# Patient Record
Sex: FEMALE | Race: WHITE | NOT HISPANIC OR LATINO | Employment: FULL TIME | URBAN - METROPOLITAN AREA
[De-identification: names, ages, dates, MRNs, and addresses within clinical notes are randomized per-mention and may not be internally consistent; named-entity substitution may affect disease eponyms.]

---

## 2023-12-08 ENCOUNTER — EVALUATION (OUTPATIENT)
Dept: PHYSICAL THERAPY | Facility: CLINIC | Age: 38
End: 2023-12-08
Payer: COMMERCIAL

## 2023-12-08 DIAGNOSIS — M25.552 LEFT HIP PAIN: Primary | ICD-10-CM

## 2023-12-08 PROCEDURE — 97161 PT EVAL LOW COMPLEX 20 MIN: CPT | Mod: GP | Performed by: PHYSICAL THERAPIST

## 2023-12-08 PROCEDURE — 97535 SELF CARE MNGMENT TRAINING: CPT | Mod: GP | Performed by: PHYSICAL THERAPIST

## 2023-12-08 ASSESSMENT — ENCOUNTER SYMPTOMS
DEPRESSION: 0
LOSS OF SENSATION IN FEET: 0
OCCASIONAL FEELINGS OF UNSTEADINESS: 0

## 2023-12-08 NOTE — PROGRESS NOTES
Physical Therapy Evaluation    Patient Name: Christine Bustos  MRN: [id number]  Today's Date: 12/8/2023  Referred by: Dr. Tonya Marrero  Time Calculation  Start Time: 0715  Stop Time: 0800  Time Calculation (min): 45 min  Diagnosis:  1. Left hip pain  Follow Up In Physical Therapy      PRECAUTIONS:   none    SUBJECTIVE:  Patient reports she's very active, act gym 5-7 days/week for strength training, started having pelvic pain and anterior left hip pain beginning of November, cleared of pelvic issues by OBGYN, met with sports medicine physician and diagnosed with hip impingement syndrome, current c/o pain with specific motions, limited lifting at the gym.  Pain:  0-6/10  Home Living:  No issues  Prior level of function:  Very active without limitation    OBJECTIVE:  Hip AROM: (degrees) Left Right   Flexion 90    Extension 0    Abduction 30    External Rotation 20    Internal Rotation 20      Hip PROM: (degrees) Left Right   Flexion 100    Extension 0    Abduction 45    External Rotation 30    Internal Rotation 30      Hip Strength: MMT Left Right   Flexion 5/5 /5   Extension 4+/5 /5   Abduction 4-/5 /5   External Rotation 4-/5 /5   Internal Rotation 4+/5 /5     Positive Special Tests:  + FADIR  Gait:  normal  Palpation:  + anterior left hip  Flexibility:   Hamstrings: good   Quadriceps: tight   Hip Flexors: very tight  Functional Outcome Measure:  HOS: 43%    ASSESSMENT:  Patient presents with signs consistent with diagnosis of left hip impingement syndrome with probable labral tear,  deficits noted in anterior hip flexibility and lateral/posterior hip strength, will benefit from skilled PT program to address deficits with a goal of returning patient to active lifestyle pan-free, will benefit from ortho consult if symptoms persist.    TREATMENT:  Initial evaluation performed followed by discussion of findings and instruction in HEP.    PATIENT EDUCATION:  Access Code: [email or web address]  URL:  [email or web address]  Date: 12/08/2023  Prepared by: West Arambula    Exercises  - Half Kneeling Hip Flexor Stretch with Sidebend  - 1 x daily - 7 x weekly - 1 sets - 5 reps - 30 hold  - Supine Bridge with Resistance Band  - 1 x daily - 7 x weekly - 3 sets - 10 reps  - Clamshell with Resistance  - 1 x daily - 7 x weekly - 3 sets - 10 reps  - Sidelying Hip Abduction with Resistance at Thighs  - 1 x daily - 7 x weekly - 3 sets - 10 reps    PLAN:   HEP daily, follow up in 2-3 weels, re-eval in 6 weeks.    Rehab potential:  Good  Plan of care agreement  Patient    GOALS:  Active       PT Problem       Decrease c/o left hip pain to 2/10 at worst       Start:  12/08/23    Expected End:  02/06/24            Improve left hip strength to at least 4+/5       Start:  12/08/23    Expected End:  02/06/24            Improve HOS score by at least 20%, independent with HEP       Start:  12/08/23    Expected End:  02/06/24

## 2023-12-29 ENCOUNTER — TREATMENT (OUTPATIENT)
Dept: PHYSICAL THERAPY | Facility: CLINIC | Age: 38
End: 2023-12-29
Payer: COMMERCIAL

## 2023-12-29 DIAGNOSIS — M25.552 LEFT HIP PAIN: ICD-10-CM

## 2023-12-29 PROCEDURE — 97535 SELF CARE MNGMENT TRAINING: CPT | Mod: GP | Performed by: PHYSICAL THERAPIST

## 2023-12-29 NOTE — PROGRESS NOTES
Physical Therapy Treatment    Patient Name: Christine Bustos  MRN: [id number]  Today's Date: 12/29/2023  Visit: 2/20  Diagnosis:   1. Left hip pain  Follow Up In Physical Therapy      PRECAUTIONS:  none    SUBJECTIVE:  Patient reports she's been consistent with her exercises but there hasn't been an improvement in pain, sometimes she feels worse after the exercises.  PAIN:   0-6/10 left hip    OBJECTIVE:  Left hip abduction/ER strength 4/5    TREATMENT:  Quick recheck and HEP update      ASSESSMENT:  Patient with continued signs of hip LIANNE with labral tear, unclear if further exercises will help resolve symptoms but rec she continue with exercises and set up appointment with Dr. Cruz in the coming month for further evaluation.    EDUCATION:  Access Code: [email or web address]  URL: [email or web address]  Date: 12/29/2023  Prepared by: West Arambula    Exercises  - Single Leg Bridge  - 1 x daily - 7 x weekly - 3 sets - 10 reps  - Quadruped Fire Hydrant  - 1 x daily - 7 x weekly - 3 sets - 10 reps  - Backward Band Walks with Resistance at Thighs and Feet  - 1 x daily - 7 x weekly - 3 sets - 10 reps  - Beginner Front Arm Support  - 1 x daily - 7 x weekly - 3 sets - 10 reps    PLAN:   Continue with HEP, add new exercises, set up ortho appointment with Dr. Cruz.

## 2024-01-11 DIAGNOSIS — M25.552 LEFT HIP PAIN: ICD-10-CM

## 2024-01-15 ENCOUNTER — OFFICE VISIT (OUTPATIENT)
Dept: ORTHOPEDIC SURGERY | Facility: HOSPITAL | Age: 39
End: 2024-01-15
Payer: COMMERCIAL

## 2024-01-15 ENCOUNTER — HOSPITAL ENCOUNTER (OUTPATIENT)
Dept: RADIOLOGY | Facility: HOSPITAL | Age: 39
Discharge: HOME | End: 2024-01-15
Payer: COMMERCIAL

## 2024-01-15 VITALS — BODY MASS INDEX: 18.19 KG/M2 | HEIGHT: 68 IN | WEIGHT: 120 LBS

## 2024-01-15 DIAGNOSIS — M25.852 FEMOROACETABULAR IMPINGEMENT OF LEFT HIP: ICD-10-CM

## 2024-01-15 DIAGNOSIS — M25.552 LEFT HIP PAIN: ICD-10-CM

## 2024-01-15 PROCEDURE — 73502 X-RAY EXAM HIP UNI 2-3 VIEWS: CPT | Mod: LT

## 2024-01-15 PROCEDURE — 73502 X-RAY EXAM HIP UNI 2-3 VIEWS: CPT | Mod: LEFT SIDE | Performed by: RADIOLOGY

## 2024-01-15 PROCEDURE — 99204 OFFICE O/P NEW MOD 45 MIN: CPT | Performed by: ORTHOPAEDIC SURGERY

## 2024-01-15 PROCEDURE — 99214 OFFICE O/P EST MOD 30 MIN: CPT | Performed by: ORTHOPAEDIC SURGERY

## 2024-01-15 PROCEDURE — 1036F TOBACCO NON-USER: CPT | Performed by: ORTHOPAEDIC SURGERY

## 2024-01-15 RX ORDER — BENZONATATE 100 MG/1
100 CAPSULE ORAL EVERY 8 HOURS PRN
COMMUNITY
Start: 2024-01-14 | End: 2024-02-15 | Stop reason: WASHOUT

## 2024-01-15 RX ORDER — ACETAMINOPHEN 500 MG
5000 TABLET ORAL DAILY
COMMUNITY
Start: 2022-10-31

## 2024-01-15 RX ORDER — ALBUTEROL SULFATE 90 UG/1
2 AEROSOL, METERED RESPIRATORY (INHALATION) EVERY 4 HOURS PRN
COMMUNITY
Start: 2023-03-06

## 2024-01-15 RX ORDER — BUDESONIDE 180 UG/1
1 AEROSOL, POWDER RESPIRATORY (INHALATION) 2 TIMES DAILY
COMMUNITY
Start: 2023-01-16

## 2024-01-15 ASSESSMENT — PAIN SCALES - GENERAL: PAINLEVEL_OUTOF10: 10 - WORST POSSIBLE PAIN

## 2024-01-15 ASSESSMENT — PAIN DESCRIPTION - DESCRIPTORS: DESCRIPTORS: SHARP

## 2024-01-15 ASSESSMENT — PAIN - FUNCTIONAL ASSESSMENT: PAIN_FUNCTIONAL_ASSESSMENT: 0-10

## 2024-01-15 NOTE — PROGRESS NOTES
HPI  Christine is a 38-year-old female self-referred to clinic for evaluation of left hip pain.  The patient reports onset of left anterior groin pain that is characterized as dull, aching in nature in November 2023.  She denies specific trauma or inciting event.  The patient states that the pain is exacerbated by sitting for prolonged periods of time or certain activities in the gym that require rotation, squatting, or lunging.  She was initially evaluated by her chiropractor where manipulation offered limited symptomatic improvement and then started a course of structured hip physical therapy that she continues to attend.  PT/OT has provided limited relief for her pain.  The patient states that she does not prefer anti-inflammatory medications and has not received an intra-articular corticosteroid injection.  She denies specific catching or clicking in the left hip.  Her family history is pertinent for hip impingement and her father.    ROS  Review of systems reviewed and pertinent positives mentioned in HPI.    PHYSICAL EXAM  There is not  pain with a resisted situp.    There is not abdominal distention or tenderness    The patient's range of motion reveals that they have 90° of hip flexion on the affected side.   Hip extension to 10°, IR 5°    Abduction to 45°    The patient is 5 out of 5 strength with resisted hip AB, adduction, hamstring and quadriceps testing.  No pain over the hip flexor, ASIS.  No pain over the proximal hamstring, piriformis    Pain Provacation testing:  Positive impingement sign,with a painful arc from 12 to 3:00.  Negative Psoas impingement/Yeison test  Negative instability, Log roll.  Positive subspine impingement test, which is pain with straight hip flexion.  Negative straight leg raise.  Negative circumduction clunk.    Peritrochanteric space examination:  Tenderness over the dean-trochanteric space - No  pain over the posterior trochanter - No      IMAGING  X-rays reviewed reveal no  gross fracture or dislocation. and evidence of femoral acetabular impingement with an alpha angle of 60.5 degrees.  Acetabular index of 2.2 degrees  without acetabular retroversion.  Lateral center edge of 21.4 degrees.    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    PLAN  This is a 38 y.o. female patient here today with significant hip pain despite a course of PT/OT.  Radiographs were reviewed with the patient in clinic today.  We discussed the radiographic signs to suggest underlying femoroacetabular impingement and concern for early labral calcification.  MRI scan of the left hip was ordered in clinic today to better characterize the suspected labral pathology and evaluate the chondral surfaces.  All questions were answered to her apparent satisfaction in clinic today.  She will follow-up after completion of the MRI scan.

## 2024-01-19 ENCOUNTER — APPOINTMENT (OUTPATIENT)
Dept: PHYSICAL THERAPY | Facility: CLINIC | Age: 39
End: 2024-01-19
Payer: COMMERCIAL

## 2024-01-19 ENCOUNTER — OFFICE VISIT (OUTPATIENT)
Dept: PRIMARY CARE | Facility: CLINIC | Age: 39
End: 2024-01-19
Payer: COMMERCIAL

## 2024-01-19 VITALS — HEART RATE: 72 BPM | RESPIRATION RATE: 12 BRPM | DIASTOLIC BLOOD PRESSURE: 65 MMHG | SYSTOLIC BLOOD PRESSURE: 110 MMHG

## 2024-01-19 DIAGNOSIS — R05.1 ACUTE COUGH: Primary | ICD-10-CM

## 2024-01-19 DIAGNOSIS — R59.0 AXILLARY LYMPHADENOPATHY: ICD-10-CM

## 2024-01-19 PROCEDURE — 1036F TOBACCO NON-USER: CPT | Performed by: FAMILY MEDICINE

## 2024-01-19 PROCEDURE — 99204 OFFICE O/P NEW MOD 45 MIN: CPT | Performed by: FAMILY MEDICINE

## 2024-01-19 RX ORDER — CODEINE PHOSPHATE AND GUAIFENESIN 10; 100 MG/5ML; MG/5ML
5 SOLUTION ORAL EVERY 8 HOURS PRN
COMMUNITY
Start: 2024-01-18 | End: 2024-01-26

## 2024-01-19 RX ORDER — DOXYCYCLINE 100 MG/1
100 CAPSULE ORAL 2 TIMES DAILY
Qty: 20 CAPSULE | Refills: 0 | Status: SHIPPED | OUTPATIENT
Start: 2024-01-19 | End: 2024-01-29

## 2024-01-19 NOTE — PROGRESS NOTES
Subjective   Patient ID: Christine Bustos is a 38 y.o. female who presents for cough for 10 days with green phlegm and rt axillary lymphadenopathy for 6 weeks  HPI   Patient started to have cough, green  phlegm 10 days ago. No fever or chills. No Cp, heart palpitation, hemoptysis, HA, dizziness, neck stiffness or confusion. Symptoms persisted today. Rt axillary area lymphadenopathy has been ongoing for 6 weeks. No breast lump and rt arm skin infection. No wt loss. Cxr was normal    Review of Systems    Objective   /65   Pulse 72   Resp 12     Physical Exam  No  distress, well groomed, eyes: PERRLA, No sclera icterus. No sinus tenderness or nasal discharge, neck: supple, No cervical but + rt  axillary lymphadenopathy. lungs: cta  b/l, no rales, heart: RRR, cap refill was less than 2 secs, no cyanosis, clubbing or LE edema, abd: soft, no tenderness, BS+, Good balance.     Assessment/Plan   Problem List Items Addressed This Visit             ICD-10-CM    Acute cough - Primary R05.1     Abx as dir. Cont cough syrup and albuterol prn. Call office if symptoms do not resolve in 10  days           Relevant Medications    doxycycline (Vibramycin) 100 mg capsule    Axillary lymphadenopathy R59.0     Will monitor. Will check Us if persistent after 1 mos

## 2024-01-20 NOTE — ASSESSMENT & PLAN NOTE
Abx as dir. Cont cough syrup and albuterol prn. Call office if symptoms do not resolve in 10  days

## 2024-01-31 ENCOUNTER — OFFICE VISIT (OUTPATIENT)
Dept: PRIMARY CARE | Facility: CLINIC | Age: 39
End: 2024-01-31
Payer: COMMERCIAL

## 2024-01-31 VITALS — HEART RATE: 68 BPM | SYSTOLIC BLOOD PRESSURE: 112 MMHG | DIASTOLIC BLOOD PRESSURE: 68 MMHG

## 2024-01-31 DIAGNOSIS — R59.0 AXILLARY LYMPHADENOPATHY: Primary | ICD-10-CM

## 2024-01-31 PROCEDURE — 1036F TOBACCO NON-USER: CPT | Performed by: FAMILY MEDICINE

## 2024-01-31 PROCEDURE — 99213 OFFICE O/P EST LOW 20 MIN: CPT | Performed by: FAMILY MEDICINE

## 2024-01-31 NOTE — PROGRESS NOTES
Subjective   Patient ID: Christine Bustos is a 38 y.o. female who presents for rt axillary lump    HPI   Rt axillary lump for 2 mos persisted. Pt found a 2nd ar axillary lump 1 week ago. No swelling pain pain, no wt loss. No nipple discharge  Review of Systems    Objective   /68   Pulse 68     Physical Exam  No  distress, well groomed, eyes: No sclera icterus. No sinus tenderness or nasal discharge, ? Rt  axillary enlarged  lungs: CTA  b/l, no rales, heart: RRR, cap refill was less than 2 secs,  abd: soft, no tenderness, BS+, Good balance.     Assessment/Plan   Problem List Items Addressed This Visit             ICD-10-CM    Axillary lymphadenopathy - Primary R59.0     Persistent with 2nd axillary lump found 1 week ago. Surgeon eval         Relevant Orders    Referral to General Surgery

## 2024-02-05 ENCOUNTER — OFFICE VISIT (OUTPATIENT)
Dept: SURGERY | Facility: CLINIC | Age: 39
End: 2024-02-05
Payer: COMMERCIAL

## 2024-02-05 VITALS
HEART RATE: 75 BPM | DIASTOLIC BLOOD PRESSURE: 75 MMHG | SYSTOLIC BLOOD PRESSURE: 110 MMHG | WEIGHT: 122.4 LBS | BODY MASS INDEX: 18.55 KG/M2 | OXYGEN SATURATION: 98 % | HEIGHT: 68 IN

## 2024-02-05 DIAGNOSIS — R59.0 AXILLARY LYMPHADENOPATHY: Primary | ICD-10-CM

## 2024-02-05 PROCEDURE — 1036F TOBACCO NON-USER: CPT | Performed by: SURGERY

## 2024-02-05 PROCEDURE — 99204 OFFICE O/P NEW MOD 45 MIN: CPT | Performed by: SURGERY

## 2024-02-05 ASSESSMENT — ENCOUNTER SYMPTOMS
CHILLS: 0
FEVER: 0
ABDOMINAL PAIN: 0
CONSTIPATION: 0
UNEXPECTED WEIGHT CHANGE: 0
BLOOD IN STOOL: 0
NAUSEA: 0
PALPITATIONS: 0
HEADACHES: 0
DIARRHEA: 0
COUGH: 0
VOMITING: 0
SHORTNESS OF BREATH: 0
DIZZINESS: 0

## 2024-02-05 NOTE — PROGRESS NOTES
GENERAL SURGERY CLINIC NOTE    Christine Bustos   1985   [id number]     History Of Present Illness  Christine Bustos is a 38 y.o. female who presents to the office for evaluation of a right axillary mass. She palpated the first lymph node 2-3 months ago and a second lymph node a few weeks ago. She has had a sinus issue/cough for a few weeks and took antibiotics. She underwent imaging at [facility]  that showed a likely benign 1.2cm lymph node that was previously 0.7cm. Her weight has been stable. She denies B symptoms. The patient is in the process of switching from [facility] to . She has a  PCP.      Past Medical History  She has no past medical history on file.    Surgical History  She has a past surgical history that includes Other surgical history (2016); Other surgical history (2016); and Other surgical history (2017).    Medications  Current Outpatient Medications on File Prior to Visit   Medication Sig Dispense Refill    albuterol 90 mcg/actuation inhaler Inhale 2 puffs every 4 hours if needed.      benzonatate (Tessalon) 100 mg capsule Take 1 capsule (100 mg) by mouth every 8 hours if needed.      cholecalciferol (Vitamin D-3) 25 MCG (1000 UT) tablet       [] doxycycline (Vibramycin) 100 mg capsule Take 1 capsule (100 mg) by mouth 2 times a day for 10 days. Take with at least 8 ounces (large glass) of water, do not lie down for 30 minutes after 20 capsule 0    Pulmicort Flexhaler 180 mcg/actuation inhaler Inhale 1 puff twice a day.       No current facility-administered medications on file prior to visit.       Allergies  Motrin [ibuprofen]     Social History  She reports that she has never smoked. She has never used smokeless tobacco. She reports that she does not drink alcohol and does not use drugs.    Family History  No family history on file.     Review of Systems   Constitutional:  Negative for chills, fever and unexpected weight change.   Respiratory:  Negative for cough and shortness  of breath.    Cardiovascular:  Negative for chest pain and palpitations.   Gastrointestinal:  Negative for abdominal pain, blood in stool, constipation, diarrhea, nausea and vomiting.   Neurological:  Negative for dizziness and headaches.   All other systems reviewed and are negative.      Last Recorded Vitals  There were no vitals taken for this visit.     Physical Exam  Constitutional:       General: She is not in acute distress.     Appearance: Normal appearance. She is not ill-appearing.      Comments: thin   HENT:      Head: Normocephalic and atraumatic.   Cardiovascular:      Rate and Rhythm: Normal rate and regular rhythm.   Pulmonary:      Effort: Pulmonary effort is normal. No respiratory distress.      Breath sounds: Normal breath sounds.   Abdominal:      General: There is no distension.      Palpations: Abdomen is soft.      Tenderness: There is no abdominal tenderness. There is no guarding.   Musculoskeletal:         General: No swelling.   Lymphadenopathy:      Upper Body:      Right upper body: Axillary adenopathy present.      Comments: Difficult to palpate the lymph nodes of concern, despite thin body habitus. Patient had to sit up for one of the nodes to be palpable   Skin:     General: Skin is warm and dry.   Neurological:      Mental Status: She is alert and oriented to person, place, and time. Mental status is at baseline.   Psychiatric:         Mood and Affect: Mood normal.         Behavior: Behavior normal.          Relevant Results  CT CHEST WO IVCON  IMPRESSION: 1.  Scattered subcentimeter sized pulmonary nodules measuring up to 4 mm are stable since 01/21/2023 and are most certainly benign.  No further follow-up is warranted for these nodules. 2.  Mildly enlarged RIGHT axillary lymph node with central fatty hilum, favored to be benign. : NAILA   Transcribe Date/Time: Jan 13 2024  9:44A Dictated by : LEE ANN SUN MD This examination was interpreted and the report reviewed and  electronically signed by: LEE ANN SUN MD on Jan 13 2024  9:49AM  EST    US BREAST LTD RIGHT  IMPRESSION: INCOMPLETE: NEEDS ADDITIONAL IMAGING EVALUATION There is no abnormality seen in the right breast to correspond with the palpable abnormality in the axilla, however, ultrasound is recommended. LIMITED ULTRASOUND OF RIGHT BREAST: 1/11/2024 RESULT: No prior exams were available for comparison. Real-time ultrasound of the right breast was performed. There is an enlarged lymph node with uniform cortex thickening in the right axillary tail.  This enlarged lymph node with uniform cortex thickening displays fatty hilum.  This correlates as palpated.  Color flow imaging demonstrates that there is increased vascularity.   Multiple right axillary lymph nodes are normal in architecture but with abnormally thickened cortex.  Comparison left axillary lymph nodes are normal. IMPRESSION: PROBABLY BENIGN - SHORT TERM INTERVAL FOLLOW-UP RECOMMENDED The enlarged lymph node with uniform cortex thickening is probably benign. A follow-up ultrasound in 3 months is recommended. SUMMARY: As the differential for lymphadenopathy is broad, careful clinical and laboratory evaluation and followup to assess for cause of caryl reactivity is advised. Differential possibilities include but are not limited to autoimmune (lupus, rheumatoid arthritis, scleroderma) infectious (cat scratch disease, etc) sarcoidosis, and neoplastic. Lucia Meyers M.D., mc/huang:1/11/2024 14:29:44 Multiple national specialty organizations have released breast cancer screening guidelines for women at average risk for developing breast cancer - guidelines that are based on both evidence and opinion, yet differ on when to start and how often to screen for breast cancer. With representation from Breast Imaging, Internal Medicine, Women's Health, Family Medicine, and Medical/Surgical Oncology, the [facility] has carefully reviewed the data and reached the following  consensus: 1) All women should engage in shared decision-making with their providers to decide when to start and how often to screen; 2) All women should have the opportunity to start screening mammography at age 40; 3) For women ages 45-55, we recommend annual screening mammograms; 4) For women ages 55 and over, we support both the transition from an annual to a biennial interval if this aligns more with patient's values and preferences, or continuation with annual screening; 5) All women should discuss with their providers when to stop screening mammograms. Imaging Technologist(s): Grace Robbins, RT(R)(M), [facility]; Christine Crain RT(R)(M), [facility] letter sent: # Mo FU   OVERALL STUDY BIRADS: 3 Probably benign finding - short term interval follow-up recommended : Thelma Transcribe Date/Time: Jan 11 2024  1:11P Dictated by : RICKI BARCENAS MD This examination was interpreted and the report reviewed and electronically signed by: RICKI BARCENAS MD on Jan 11 2024  2:29PM  EST    Assessment and Plan  38 y.o. female with right axillary lymphadenopathy that is more likely benign. I discussed considering interval US vs biopsy and the patient would prefer to undergo biopsy. As they are not that easily palpated especially when lying supine, I recommend US-guided biopsy by IR. If the results are inconclusive, and I am able to palpate the lymph node supine, then we can consider excisional biopsy. We will call her with the results after the US-guided biopsy and she can follow up as needed. She expressed her understanding and all questions were answered.     Risa Vergara MD, FACS  General Surgery

## 2024-02-06 ENCOUNTER — TELEPHONE (OUTPATIENT)
Dept: RADIOLOGY | Facility: HOSPITAL | Age: 39
End: 2024-02-06
Payer: COMMERCIAL

## 2024-02-06 NOTE — TELEPHONE ENCOUNTER
Call back to patient for follow up scheduling per provider request for US bx right axillary LN. Patient reports some anxiousness regarding needle biopsy, therapeutic listening and support provided. What to expect before, during, and after the procedure reviewed with the patient including strategies to help with anxiety during procedure and patients therapeutic preferences. Will notify US team of patient therapeutic preferences prior to procedure start. Patient accepted 2/7 1015 with correct read back after appointment review. All questions answered including directions to her visit and how to reach me directly with questions or concerns before concluding our call.

## 2024-02-07 ENCOUNTER — HOSPITAL ENCOUNTER (OUTPATIENT)
Dept: RADIOLOGY | Facility: HOSPITAL | Age: 39
Discharge: HOME | End: 2024-02-07
Payer: COMMERCIAL

## 2024-02-07 DIAGNOSIS — R59.0 AXILLARY LYMPHADENOPATHY: ICD-10-CM

## 2024-02-07 PROCEDURE — 10035 PLMT SFT TISS LOCLZJ DEV 1ST: CPT

## 2024-02-07 PROCEDURE — 38505 NEEDLE BIOPSY LYMPH NODES: CPT

## 2024-02-07 PROCEDURE — 88305 TISSUE EXAM BY PATHOLOGIST: CPT | Mod: TC,SUR,PORLAB | Performed by: SURGERY

## 2024-02-07 PROCEDURE — 88342 IMHCHEM/IMCYTCHM 1ST ANTB: CPT | Performed by: PATHOLOGY

## 2024-02-07 PROCEDURE — A4648 IMPLANTABLE TISSUE MARKER: HCPCS

## 2024-02-07 PROCEDURE — 88341 IMHCHEM/IMCYTCHM EA ADD ANTB: CPT | Performed by: PATHOLOGY

## 2024-02-07 PROCEDURE — 2720000003 HC TRAY FOLEY SILER W URIMETER

## 2024-02-07 PROCEDURE — 2720000007 HC OR 272 NO HCPCS

## 2024-02-07 PROCEDURE — 88305 TISSUE EXAM BY PATHOLOGIST: CPT | Performed by: PATHOLOGY

## 2024-02-07 PROCEDURE — 88365 INSITU HYBRIDIZATION (FISH): CPT | Performed by: PATHOLOGY

## 2024-02-07 PROCEDURE — 76942 ECHO GUIDE FOR BIOPSY: CPT

## 2024-02-07 NOTE — NURSING NOTE
"This RN Navigator provided therapeutic interventions/support to patient before, during, and after biopsy procedure per patient request. Patient verbalized she was \"anxious\" prior to procedure, selecting Aromatab per her preference for aromatherapy during her procedure. Assistance with guided relaxation and deep breathing provided during the procedure with successful completion of procedure. Patient rested at side of bed with guided relaxation <5 minutes until ready to dress and review post-procedure discharge. Patient calm and interactive with no sign of distress at the end of her visit.   "

## 2024-02-14 ENCOUNTER — HOSPITAL ENCOUNTER (OUTPATIENT)
Dept: RADIOLOGY | Facility: HOSPITAL | Age: 39
Discharge: HOME | End: 2024-02-14
Payer: COMMERCIAL

## 2024-02-14 DIAGNOSIS — M25.852 FEMOROACETABULAR IMPINGEMENT OF LEFT HIP: ICD-10-CM

## 2024-02-14 LAB
LAB AP ASR DISCLAIMER: NORMAL
LABORATORY COMMENT REPORT: NORMAL
PATH REPORT.ADDENDUM SPEC: NORMAL
PATH REPORT.FINAL DX SPEC: NORMAL
PATH REPORT.GROSS SPEC: NORMAL
PATH REPORT.RELEVANT HX SPEC: NORMAL
PATH REPORT.TOTAL CANCER: NORMAL
RESIDENT REVIEW: NORMAL

## 2024-02-14 PROCEDURE — 27093 INJECTION FOR HIP X-RAY: CPT | Mod: LT

## 2024-02-14 PROCEDURE — 27093 INJECTION FOR HIP X-RAY: CPT | Mod: LEFT SIDE | Performed by: RADIOLOGY

## 2024-02-14 PROCEDURE — 2550000001 HC RX 255 CONTRASTS: Performed by: ORTHOPAEDIC SURGERY

## 2024-02-14 PROCEDURE — 77002 NEEDLE LOCALIZATION BY XRAY: CPT | Mod: LEFT SIDE | Performed by: RADIOLOGY

## 2024-02-14 PROCEDURE — A9577 INJ MULTIHANCE: HCPCS | Performed by: ORTHOPAEDIC SURGERY

## 2024-02-14 PROCEDURE — 73525 CONTRAST X-RAY OF HIP: CPT | Mod: LT

## 2024-02-14 PROCEDURE — 73722 MRI JOINT OF LWR EXTR W/DYE: CPT | Mod: LEFT SIDE | Performed by: STUDENT IN AN ORGANIZED HEALTH CARE EDUCATION/TRAINING PROGRAM

## 2024-02-14 PROCEDURE — 2500000005 HC RX 250 GENERAL PHARMACY W/O HCPCS

## 2024-02-14 RX ORDER — LIDOCAINE HYDROCHLORIDE 10 MG/ML
INJECTION INFILTRATION; PERINEURAL
Status: COMPLETED
Start: 2024-02-14 | End: 2024-02-14

## 2024-02-14 RX ORDER — LIDOCAINE HYDROCHLORIDE 10 MG/ML
5 INJECTION, SOLUTION EPIDURAL; INFILTRATION; INTRACAUDAL; PERINEURAL ONCE
Status: CANCELLED | OUTPATIENT
Start: 2024-02-14 | End: 2024-02-14

## 2024-02-14 RX ADMIN — LIDOCAINE HYDROCHLORIDE 5 ML: 10 INJECTION, SOLUTION INFILTRATION; PERINEURAL at 09:43

## 2024-02-14 RX ADMIN — IOHEXOL 5 ML: 240 INJECTION, SOLUTION INTRATHECAL; INTRAVASCULAR; INTRAVENOUS; ORAL at 09:44

## 2024-02-14 RX ADMIN — GADOBENATE DIMEGLUMINE 0.1 ML: 529 INJECTION, SOLUTION INTRAVENOUS at 09:44

## 2024-02-14 NOTE — POST-PROCEDURE NOTE
Interventional Radiology Brief Postprocedure Note    Attending: Armen Muhammad    Assistant: N/A    Diagnosis: Pain    Description of procedure: The risks, benefits and alternatives of the procedure were discussed with the patient. The patient was given the chance to ask questions, and all were answered prior to proceeding. At this time, written informed consent was obtained.      The patient was placed in the supine position on the fluoroscopic table and was prepped and draped in the usual sterile fashion. The left hip joint was localized under fluoroscopy. Lidocaine was used for local anesthesia. Under fluoroscopic guidance a 20 gauge needle was inserted into the left hip joint. Approximately 10 ML of a solution containing lidocaine, Omnipaque 240 iodinated contrast and Multihance gadolinium contrast was injected into the [] joint.      The patient tolerated the procedure well and no immediate complication was noted.      Total fluoroscopy time was [7 seconds].     Anesthesia:  Local    Complications: None    Estimated Blood Loss: none    Medications (Filter: Administrations occurring from 1143 to 1143 on 02/14/24) As of 02/14/24 1143      None          No specimens collected      See detailed result report with images in PACS.    The patient tolerated the procedure well without incident or complication and is in stable condition.

## 2024-02-15 ENCOUNTER — TELEPHONE (OUTPATIENT)
Dept: SURGERY | Facility: HOSPITAL | Age: 39
End: 2024-02-15
Payer: COMMERCIAL

## 2024-02-15 DIAGNOSIS — R59.0 AXILLARY LYMPHADENOPATHY: Primary | ICD-10-CM

## 2024-02-15 NOTE — TELEPHONE ENCOUNTER
I called the patient to discuss the pathology results with her from her lymph node biopsy. There is some follicular hyperplasia and no evidence of malignancy. The lymph node may or may not decrease in size over time. No additional recommendations. She expressed her understanding and all questions were answered.

## 2024-02-16 ENCOUNTER — APPOINTMENT (OUTPATIENT)
Dept: ORTHOPEDIC SURGERY | Facility: HOSPITAL | Age: 39
End: 2024-02-16
Payer: COMMERCIAL

## 2024-02-19 ENCOUNTER — OFFICE VISIT (OUTPATIENT)
Dept: PRIMARY CARE | Facility: CLINIC | Age: 39
End: 2024-02-19
Payer: COMMERCIAL

## 2024-02-19 VITALS
BODY MASS INDEX: 18.04 KG/M2 | SYSTOLIC BLOOD PRESSURE: 112 MMHG | WEIGHT: 119 LBS | HEIGHT: 68 IN | HEART RATE: 76 BPM | DIASTOLIC BLOOD PRESSURE: 68 MMHG | RESPIRATION RATE: 12 BRPM

## 2024-02-19 DIAGNOSIS — Z00.00 ROUTINE MEDICAL EXAM: Primary | ICD-10-CM

## 2024-02-19 PROBLEM — G89.29 CHRONIC PELVIC PAIN IN FEMALE: Status: ACTIVE | Noted: 2017-08-24

## 2024-02-19 PROBLEM — R10.2 CHRONIC PELVIC PAIN IN FEMALE: Status: ACTIVE | Noted: 2017-08-24

## 2024-02-19 PROBLEM — Q21.12 PFO (PATENT FORAMEN OVALE) (HHS-HCC): Status: ACTIVE | Noted: 2023-01-01

## 2024-02-19 PROBLEM — R91.1 PULMONARY NODULE: Status: ACTIVE | Noted: 2021-06-23

## 2024-02-19 PROBLEM — K76.89 BENIGN LIVER CYST: Status: ACTIVE | Noted: 2023-11-10

## 2024-02-19 PROBLEM — J45.21 MILD INTERMITTENT ASTHMA WITH ACUTE EXACERBATION (HHS-HCC): Status: ACTIVE | Noted: 2024-01-18

## 2024-02-19 PROBLEM — M25.852 FEMOROACETABULAR IMPINGEMENT OF LEFT HIP: Status: ACTIVE | Noted: 2024-02-19

## 2024-02-19 PROBLEM — R05.1 ACUTE COUGH: Status: RESOLVED | Noted: 2024-01-19 | Resolved: 2024-02-19

## 2024-02-19 PROBLEM — R06.02 SOB (SHORTNESS OF BREATH): Status: ACTIVE | Noted: 2021-06-23

## 2024-02-19 PROCEDURE — 1036F TOBACCO NON-USER: CPT | Performed by: FAMILY MEDICINE

## 2024-02-19 PROCEDURE — 99395 PREV VISIT EST AGE 18-39: CPT | Performed by: FAMILY MEDICINE

## 2024-02-19 NOTE — PROGRESS NOTES
No concerns today. pt has no regular dental visits. no vision problems. no hearing loss.   Lifestyle: healthy diet. no weight concerns. Pt exercises regularly. no tobacco or alcohol abuse.   Safety elements used: seat belt, safe driving habits and smoke detector.   No passive smoke exposure, chemical abuse, domestic violence, anxiety symptoms, depression symptoms.  Pt has safe sexual behavior, safe driving habits. No driving violations, history of DUI.  No tuberculosis exposure.   Reproductive health: the patient is premenopausal. she reports normal menses. she uses no contraception. she is sexually active.   Cervical cancer screening:. patient has no history of an abnormal pap smear.   Review of Systems  Constitutional: no chills, no fever and no night sweats.   Eyes: no blurred vision and no eyesight problems.   ENT: no hearing loss, no nasal congestion, no nasal discharge, no hoarseness and no sore throat.   Neck: no mass(es) and no swelling.   Cardiovascular: no chest pain, no intermittent leg claudication, no lower extremity edema, no palpitations and no syncope.   Respiratory: no cough, no shortness of breath during exertion, no shortness of breath at rest and no wheezing.   Gastrointestinal: no abdominal pain, no blood in stools, no constipation, no diarrhea, no melena, no nausea, no rectal pain and no vomiting.   Genitourinary: no unexplained vaginal bleeding, no dysuria, no change in urinary frequency, no genital lesions, no hematuria, no urinary hesitancy, no incontinence, no pelvic pain, no feelings of urinary urgency and no vaginal discharge.   Musculoskeletal: no arthralgias, no back pain, no localized joint pain, no myalgias and no neck pain.   Integumentary: no new skin lesions, no nipple discharge, no rashes and no skin wound.   Neurological: no confusion, no convulsions, no difficulty walking, no headache, no limb weakness, no memory changes, no numbness, no speech difficulties, no syncope and no  tingling.   Psychiatric: no anxiety, no personality change, no depression, no emotional problems, no homicidal thoughts, no anhedonia, no sleep disturbances and no substance use disorders.   Endocrine: no changes in appetite, no deepening of the voice, no polyuria, no feelings of weakness, no heat/cold intolerance, no muscle weakness, no polydipsia, no recent weight gain and no recent weight loss.   Hematologic/Lymphatic: no tendency for easy bleeding, no tendency for easy bruising, no recurrent infections and no swollen glands.     Physical Exam  Constitutional: Alert and in no acute distress. Well developed, well nourished.   Head and Face: Head and face: Normal.  Palpation of the face and sinuses: Normal.    Eyes: Normal external exam. Pupils: PERRL with normal accommodation and EOMI.   Ears, Nose, Mouth, and Throat: External inspection of ears and nose: Normal.  Hearing: Normal.  Nasal mucosa, septum, and turbinates: Normal.  Oropharynx: Normal.    Neck: No neck mass was observed. Supple. Thyroid not enlarged and there were no palpable thyroid nodules.   Cardiovascular: Heart rate and rhythm were normal, normal S1 and S2, no gallops, no murmurs and no pericardial rub. Pedal pulses: Normal. No peripheral edema.   Pulmonary: No respiratory distress. Clear bilateral breath sounds.   Chest wall: Normal.    Abdomen: Soft nontender; no abdominal mass palpated. No organomegaly. No hernias.   Musculoskeletal: Gait and station: Normal. No joint swelling seen, normal movements of all extremities. Range of motion: Normal.  Muscle strength/tone: Normal.    Skin: Normal skin color and pigmentation, normal skin turgor, and no rash. Palpation of skin and subcutaneous tissue: Normal.    Neurologic: Cranial nerves 2-12 grossly intact. Deep tendon reflexes were 2+ and symmetric at the knees. Sensation: Normal. Coordination: Normal.    Psychiatric: Judgment and insight: Intact. Alert and oriented x 3. Recent and remote memory:  Normal.  Mood and affect: Normal.   Lymphatic: No cervical lymphadenopathy. +Palpation of lymph nodes in rt axillae.      unremarkable PE.  Recommend DASH diet and regular exercise. check  lipid, TSH.  Advise eye exam by an OD yearly and dental exam every 6 months. will monitor lipid and weight yearly.    Recommend  pap smear. Pt prefers to see her Gynecologist for evaluation.

## 2024-02-21 ENCOUNTER — APPOINTMENT (OUTPATIENT)
Dept: ORTHOPEDIC SURGERY | Facility: HOSPITAL | Age: 39
End: 2024-02-21
Payer: COMMERCIAL

## 2024-02-21 ENCOUNTER — TELEMEDICINE (OUTPATIENT)
Dept: PRIMARY CARE | Facility: CLINIC | Age: 39
End: 2024-02-21
Payer: COMMERCIAL

## 2024-02-21 DIAGNOSIS — R68.89 FLU-LIKE SYMPTOMS: Primary | ICD-10-CM

## 2024-02-21 PROCEDURE — 99213 OFFICE O/P EST LOW 20 MIN: CPT | Performed by: FAMILY MEDICINE

## 2024-02-21 PROCEDURE — 1036F TOBACCO NON-USER: CPT | Performed by: FAMILY MEDICINE

## 2024-02-21 RX ORDER — OSELTAMIVIR PHOSPHATE 75 MG/1
75 CAPSULE ORAL 2 TIMES DAILY
Qty: 10 CAPSULE | Refills: 0 | Status: SHIPPED | OUTPATIENT
Start: 2024-02-21 | End: 2024-02-26

## 2024-02-21 ASSESSMENT — ENCOUNTER SYMPTOMS
SORE THROAT: 0
WHEEZING: 0
ABDOMINAL PAIN: 0
VOMITING: 0
DYSURIA: 0
NAUSEA: 0
COUGH: 1
HEADACHES: 0
DIARRHEA: 0
FEVER: 1

## 2024-02-21 NOTE — PROGRESS NOTES
Subjective   Patient ID: Christine Bustos is a 38 y.o. female who presents for fever    HPI   Patient started to have fever but chills yesterday, +  cough, shortness of breath, no  change of smell, change of taste, HA. +  fatigue, no muscle aches, body aches, congestion, runny nose, nausea, vomiting, diarrhea, sore throat,  chest pain. Rapid home covid test was -. No flu exposure    Review of Systems    Objective   There were no vitals taken for this visit.    Physical Exam    Assessment/Plan   Problem List Items Addressed This Visit             ICD-10-CM    Flu-like symptoms - Primary R68.89     Tylenol prn for fever or ache. Increase fluid intake. If symptoms do not improve in 3-4 days, call office.           Relevant Medications    oseltamivir (Tamiflu) 75 mg capsule

## 2024-02-21 NOTE — ASSESSMENT & PLAN NOTE
Tylenol prn for fever or ache. Increase fluid intake. If symptoms do not improve in 3-4 days, call office.

## 2024-02-23 ENCOUNTER — APPOINTMENT (OUTPATIENT)
Dept: PRIMARY CARE | Facility: CLINIC | Age: 39
End: 2024-02-23
Payer: COMMERCIAL

## 2024-02-27 ENCOUNTER — TELEPHONE (OUTPATIENT)
Dept: SURGERY | Facility: CLINIC | Age: 39
End: 2024-02-27
Payer: COMMERCIAL

## 2024-02-27 NOTE — TELEPHONE ENCOUNTER
----- Message from Risa Vergara MD sent at 2/27/2024  7:28 AM EST -----  Can you please let the patient know her lymph node biopsy results are benign? Very slight overgrowth of lymph node cells, but nothing concerning for cancer.

## 2024-03-05 ENCOUNTER — LAB (OUTPATIENT)
Dept: LAB | Facility: LAB | Age: 39
End: 2024-03-05
Payer: COMMERCIAL

## 2024-03-05 DIAGNOSIS — Z00.00 ROUTINE MEDICAL EXAM: ICD-10-CM

## 2024-03-05 LAB
CHOLEST SERPL-MCNC: 140 MG/DL (ref 0–199)
CHOLESTEROL/HDL RATIO: 2.1
HBV SURFACE AG SERPL QL IA: NONREACTIVE
HCV AB SER QL: NONREACTIVE
HDLC SERPL-MCNC: 65.5 MG/DL
LDLC SERPL CALC-MCNC: 58 MG/DL
NON HDL CHOLESTEROL: 75 MG/DL (ref 0–149)
TRIGL SERPL-MCNC: 83 MG/DL (ref 0–149)
TSH SERPL-ACNC: 1.92 MIU/L (ref 0.44–3.98)
VLDL: 17 MG/DL (ref 0–40)

## 2024-03-05 PROCEDURE — 87340 HEPATITIS B SURFACE AG IA: CPT

## 2024-03-05 PROCEDURE — 84443 ASSAY THYROID STIM HORMONE: CPT

## 2024-03-05 PROCEDURE — 80061 LIPID PANEL: CPT

## 2024-03-05 PROCEDURE — 86803 HEPATITIS C AB TEST: CPT

## 2024-03-05 PROCEDURE — 36415 COLL VENOUS BLD VENIPUNCTURE: CPT

## 2024-03-06 ENCOUNTER — PREP FOR PROCEDURE (OUTPATIENT)
Dept: ORTHOPEDIC SURGERY | Facility: HOSPITAL | Age: 39
End: 2024-03-06
Payer: COMMERCIAL

## 2024-03-06 ENCOUNTER — OFFICE VISIT (OUTPATIENT)
Dept: ORTHOPEDIC SURGERY | Facility: HOSPITAL | Age: 39
End: 2024-03-06
Payer: COMMERCIAL

## 2024-03-06 DIAGNOSIS — S73.192A TEAR OF LEFT ACETABULAR LABRUM, INITIAL ENCOUNTER: ICD-10-CM

## 2024-03-06 DIAGNOSIS — M25.852 FEMOROACETABULAR IMPINGEMENT OF LEFT HIP: Primary | ICD-10-CM

## 2024-03-06 DIAGNOSIS — M24.052 LOOSE BODY IN LEFT HIP: ICD-10-CM

## 2024-03-06 DIAGNOSIS — S73.192A TEAR OF LEFT ACETABULAR LABRUM, INITIAL ENCOUNTER: Primary | ICD-10-CM

## 2024-03-06 DIAGNOSIS — M25.852 FEMOROACETABULAR IMPINGEMENT OF LEFT HIP: ICD-10-CM

## 2024-03-06 PROCEDURE — 99213 OFFICE O/P EST LOW 20 MIN: CPT | Performed by: ORTHOPAEDIC SURGERY

## 2024-03-06 PROCEDURE — E0114 CRUTCH UNDERARM PAIR NO WOOD: HCPCS | Performed by: ORTHOPAEDIC SURGERY

## 2024-03-06 PROCEDURE — 1036F TOBACCO NON-USER: CPT | Performed by: ORTHOPAEDIC SURGERY

## 2024-03-06 PROCEDURE — L1686 HO POST-OP HIP ABDUCTION: HCPCS | Performed by: ORTHOPAEDIC SURGERY

## 2024-03-11 ENCOUNTER — TELEMEDICINE CLINICAL SUPPORT (OUTPATIENT)
Dept: PREADMISSION TESTING | Facility: HOSPITAL | Age: 39
End: 2024-03-11
Payer: COMMERCIAL

## 2024-03-11 VITALS — HEIGHT: 68 IN | WEIGHT: 120 LBS | BODY MASS INDEX: 18.19 KG/M2

## 2024-03-11 RX ORDER — BISMUTH SUBSALICYLATE 262 MG
1 TABLET,CHEWABLE ORAL DAILY
COMMUNITY

## 2024-03-11 NOTE — PREPROCEDURE INSTRUCTIONS
Current Medications   Medication Instructions    albuterol 90 mcg/actuation inhaler -use as needed    cholecalciferol (Vitamin D-3) 5,000 Units tablet Stop 7 days before surgery    multivitamin tablet Stop 7 days before surgery    Pulmicort Flexhaler 180 mcg/actuation inhaler Use as needed                       NPO Instructions:    Do not eat any food after midnight the night before your surgery/procedure.    Additional Instructions:     Seven/Six Days before Surgery:  Review your medication instructions, stop indicated medications  Five Days before Surgery:  Review your medication instructions, stop indicated medications  Three Days before Surgery:  Review your medication instructions, stop indicated medications  The Day before Surgery:  Review your medication instructions, stop indicated medications  You will be contacted regarding the time of your arrival to facility and surgery time  Do not eat any food after Midnight  Day of Surgery:  Review your medication instructions, take indicated medications  Wear  comfortable loose fitting clothing  Do not use moisturizers, creams, lotions or perfume  All jewelry and valuables should be left at homePAT DISCHARGE INSTRUCTIONS    Please call the Same Day Surgery (SDS) Department of the hospital where your procedure will be performed between 2:00- 3:30 PM the day before your surgery. If you are scheduled on a Monday, or a Tuesday following a Monday holiday, you will need to call on the last business day prior to your surgery.    [facility]  [address] [place] [address]  [phone]    Please let your surgeon know if:      You develop any open sores, shingles, burning or painful urination as these may increase your risk of an infection.   You no longer wish to have the surgery.   Any other personal circumstances change that may lead to the need to cancel or defer this surgery-such as being sick or getting admitted to any  hospital within one week of your planned procedure.    Your contact details change, such as a change of address or phone number.    Starting now:     Please DO NOT drink alcohol or smoke for 24 hours before surgery. It is well known that quitting smoking can make a huge difference to your health and recovery from surgery. The longer you abstain from smoking, the better your chances of a healthy recovery. If you need help with quitting, call 2-800-QUIT-NOW to be connected to a trained counselor who will discuss the best methods to help you quit.     Before your surgery:    Please stop all supplements 7 days prior to surgery. Or as directed by your surgeon.   Please stop taking NSAID pain medicine such as Advil and Motrin 7 days before surgery.    If you develop any fever, cough, cold, rashes, cuts, scratches, scrapes, urinary symptoms or infection anywhere on your body (including teeth and gums) prior to surgery, please call your surgeon’s office as soon as possible. This may require treatment to reduce the chance of cancellation on the day of surgery.    The day before your surgery:   DIET- Do not eat any food after MIDNIGHT.   Get a good night’s rest.  Use the special soap for bathing if you have been instructed to use one.    Scheduled surgery times may change and you will be notified if this occurs - please check your personal voicemail for any updates.     On the morning of surgery:   Wear comfortable, loose fitting clothes which open in the front. Please do not wear moisturizers, creams, lotions, makeup or perfume.    Please bring with you to surgery:   Photo ID and insurance card   Current list of medicines and allergies   Pacemaker/ Defibrillator/Heart stent cards   CPAP machine and mask    Slings/ splints/ crutches   A copy of your complete advanced directive/DHPOA.    Please do NOT bring with you to surgery:   All jewelry and valuables should be left at home.   Prosthetic devices such as contact lenses,  hearing aids, dentures, eyelash extensions, hairpins and body piercings must be removed prior to going in to the surgical suite.    After outpatient surgery:   A responsible adult MUST accompany you at the time of discharge and stay with you for 24 hours after your surgery. You may NOT drive yourself home after surgery.    Do not drive, operate machinery, make critical decisions or do activities that require co-ordination or balance until after a night’s sleep.   Do not drink alcoholic beverages for 24 hours.   Instructions for resuming your medications will be provided by your surgeon.    CALL YOUR DOCTOR AFTER SURGERY IF YOU HAVE:     Chills and/or a fever of 101° F or higher.    Redness, swelling, pus or drainage from your surgical wound or a bad smell from the wound.    Lightheadedness, fainting or confusion.    Persistent vomiting (throwing up) and are not able to eat or drink for 12 hours.    Three or more loose, watery bowel movements in 24 hours (diarrhea).   Difficulty or pain while urinating( after non-urological surgery)    Pain and swelling in your legs, especially if it is only on one side.    Difficulty breathing or are breathing faster than normal.    Any new concerning symptoms.      Reviewed pre-op instructions with patient, states understanding and denies further questions at this time.    If you have not received a call regarding your arrival time for surgery by 2pm on the day before surgery, you can call [phone].    Take Care  CONTACT SURGEON'S OFFICE IF YOU DEVELOP:  * Fever = 100.4 F   * New respiratory symptoms (e.g. cough, shortness of breath, respiratory distress, sore throat)  * Recent loss of taste or smell  *Flu like symptoms such as headache, fatigue or gastrointestinal symptoms  * You develop any open sores, shingles, burning or painful urination   AND/OR:  * You no longer wish to have the surgery.  * Any other personal circumstances change that may lead to the need to cancel or  defer this surgery.  *You were admitted to any hospital within one week of your planned procedure.    SMOKING:  *Quitting smoking can make a huge difference to your health and recovery from surgery.    *If you need help with quitting, call 7-192-QUIT-NOW.    THE DAY BEFORE SURGERY:  *Do not eat any food after midnight the night before your surgery.   *You may have up to TEN OUNCES of clear liquids until TWO hours before your instructed ARRIVAL TIME to hospital. This includes water, black tea/coffee, (no milk or cream) apple juice, clear broth and electrolyte drinks (Gatorade). Please avoid clear liquids that are red in color.   *You may chew gum/mints up to TWO hours before your surgery/procedure.    SURGICAL TIME:  *You will be contacted between 2 p.m. and 3 p.m. the business day before your surgery with your arrival time.  *If you haven't received a call by 3pm, call [phone]  *Scheduled surgery times may change and you will be notified if this occurs-check your personal voicemail for any updates.    ON THE MORNING OF SURGERY:  *Wear comfortable, loose fitting clothing.   *Do not use moisturizers, creams, lotions or perfume.  *All jewelry and valuables should be left at home.  *Prosthetic devices such as contact lenses, hearing aids, dentures, eyelash extensions, hairpins and body piercing must be removed before surgery.    BRING WITH YOU:  *Photo ID and insurance card  *Current list of medications and allergies  *Pacemaker/Defibrillator/Heart stent cards  *CPAP machine and mask  *Slings/splints/crutches  *Copy of your complete Advanced Directive/DHPOA-if applicable  *Neurostimulator implant remote    PARKING AND ARRIVAL:  *Check in at the Main Entrance desk and let them know you are here for surgery.    IF YOU ARE HAVING OUTPATIENT/SAME DAY SURGERY:  *A responsible adult MUST accompany you at the time of discharge and stay with you for 24 hours after your surgery.  *You may NOT drive yourself home after  surgery.  *You may use a taxi or ride sharing service ([employer], [employer]) to return home ONLY if you are accompanied by a friend or family member.  *Instructions for resuming your medications will be provided by your surgeon.    Thank you for coming to Pre Admission testing.     If I have prescribe medication please don't forget to  at your pharmacy.     Any questions about today's visit call [phone] and leave a message in the general mailbox.    Patient instructed to ambulate as soon as possible postoperatively to decrease thromboembolic risk.    Jacqui Moon RN

## 2024-03-12 ENCOUNTER — LAB (OUTPATIENT)
Dept: LAB | Facility: LAB | Age: 39
End: 2024-03-12
Payer: COMMERCIAL

## 2024-03-12 ENCOUNTER — TELEPHONE (OUTPATIENT)
Dept: ORTHOPEDIC SURGERY | Facility: HOSPITAL | Age: 39
End: 2024-03-12

## 2024-03-12 DIAGNOSIS — S73.192A TEAR OF LEFT ACETABULAR LABRUM, INITIAL ENCOUNTER: ICD-10-CM

## 2024-03-12 DIAGNOSIS — M25.852 FEMOROACETABULAR IMPINGEMENT OF LEFT HIP: ICD-10-CM

## 2024-03-12 LAB
ALBUMIN SERPL BCP-MCNC: 4.3 G/DL (ref 3.4–5)
ALP SERPL-CCNC: 49 U/L (ref 33–110)
ALT SERPL W P-5'-P-CCNC: 16 U/L (ref 7–45)
ANION GAP SERPL CALC-SCNC: 12 MMOL/L (ref 10–20)
AST SERPL W P-5'-P-CCNC: 17 U/L (ref 9–39)
BASOPHILS # BLD AUTO: 0.03 X10*3/UL (ref 0–0.1)
BASOPHILS NFR BLD AUTO: 0.8 %
BILIRUB SERPL-MCNC: 0.7 MG/DL (ref 0–1.2)
BUN SERPL-MCNC: 13 MG/DL (ref 6–23)
CALCIUM SERPL-MCNC: 9.4 MG/DL (ref 8.6–10.6)
CHLORIDE SERPL-SCNC: 105 MMOL/L (ref 98–107)
CO2 SERPL-SCNC: 28 MMOL/L (ref 21–32)
CREAT SERPL-MCNC: 0.89 MG/DL (ref 0.5–1.05)
EGFRCR SERPLBLD CKD-EPI 2021: 85 ML/MIN/1.73M*2
EOSINOPHIL # BLD AUTO: 0.07 X10*3/UL (ref 0–0.7)
EOSINOPHIL NFR BLD AUTO: 1.9 %
ERYTHROCYTE [DISTWIDTH] IN BLOOD BY AUTOMATED COUNT: 12.9 % (ref 11.5–14.5)
GLUCOSE SERPL-MCNC: 87 MG/DL (ref 74–99)
HCT VFR BLD AUTO: 37.3 % (ref 36–46)
HGB BLD-MCNC: 12.1 G/DL (ref 12–16)
IMM GRANULOCYTES # BLD AUTO: 0.01 X10*3/UL (ref 0–0.7)
IMM GRANULOCYTES NFR BLD AUTO: 0.3 % (ref 0–0.9)
LYMPHOCYTES # BLD AUTO: 1.27 X10*3/UL (ref 1.2–4.8)
LYMPHOCYTES NFR BLD AUTO: 34.9 %
MCH RBC QN AUTO: 31.7 PG (ref 26–34)
MCHC RBC AUTO-ENTMCNC: 32.4 G/DL (ref 32–36)
MCV RBC AUTO: 98 FL (ref 80–100)
MONOCYTES # BLD AUTO: 0.45 X10*3/UL (ref 0.1–1)
MONOCYTES NFR BLD AUTO: 12.4 %
NEUTROPHILS # BLD AUTO: 1.81 X10*3/UL (ref 1.2–7.7)
NEUTROPHILS NFR BLD AUTO: 49.7 %
NRBC BLD-RTO: 0 /100 WBCS (ref 0–0)
PLATELET # BLD AUTO: 198 X10*3/UL (ref 150–450)
POTASSIUM SERPL-SCNC: 4.1 MMOL/L (ref 3.5–5.3)
PROT SERPL-MCNC: 6.8 G/DL (ref 6.4–8.2)
RBC # BLD AUTO: 3.82 X10*6/UL (ref 4–5.2)
SODIUM SERPL-SCNC: 141 MMOL/L (ref 136–145)
WBC # BLD AUTO: 3.6 X10*3/UL (ref 4.4–11.3)

## 2024-03-12 PROCEDURE — 85025 COMPLETE CBC W/AUTO DIFF WBC: CPT

## 2024-03-12 PROCEDURE — 36415 COLL VENOUS BLD VENIPUNCTURE: CPT

## 2024-03-12 PROCEDURE — 80053 COMPREHEN METABOLIC PANEL: CPT

## 2024-03-12 NOTE — TELEPHONE ENCOUNTER
Called Christine and left a message regarding answers to a few questions she had with preadmission testing. If she has anymore questions I asked her to call back.

## 2024-03-13 DIAGNOSIS — M25.852 FEMOROACETABULAR IMPINGEMENT OF LEFT HIP: Primary | ICD-10-CM

## 2024-03-15 ENCOUNTER — APPOINTMENT (OUTPATIENT)
Dept: SPORTS MEDICINE | Facility: HOSPITAL | Age: 39
End: 2024-03-15
Payer: COMMERCIAL

## 2024-03-15 NOTE — PROGRESS NOTES
Patient has exhausted conservative management including therapeutic exercises, activity modification, NSAIDS.  They continue to have worsening deep groin pain with mechanical symptoms affecting ADLs.  Patient would like to proceed with surgery entailing a hip arthroscopy, acetabuloplasty for acetabular retroversion, labral repair, femoral osteoplasty, loose body removal for possible cartilaginous loose body seen on MRI, and capsular plication for mild hip instability.    We discussed the risks and benefits of surgery which included but were not limited to bleeding, infection, damage to nerves, damage to blood vessels, need for further procedures, risks of anesthesia, heterotopic ossification, femoral neck stress fracture, avascular necrosis of the femoral head, pudendal nerve palsy iatrogenic instability progression of osteoarthritis.    Patient was prescribed a hinged hip brace for LIANNE/labral tear.  The patient is ambulatory with or without aid; but, has weakness, instability and/or deformity of their left hip which requires stabilization from this orthosis to improve their function.      Verbal and written instructions for the use, wear schedule, cleaning and application of this item were given.  Patient was instructed that should the brace result in increased pain, decreased sensation, increased swelling, or an overall worsening of their medical condition, to please contact our office immediately.     Patient was prescribed crutches for LIANNE/labral tear.  This mobility device is required for the following reasons:    1. The patient has a mobility limitation that significantly impairs their ability to participate in one or more mobility-related activities of daily living (MRADL) in the home; and  2. The patient is able to safely use the mobility device; and  3. The functional mobility deficit can be sufficiently resolved with use of the mobility device    Verbal and written instructions for the use, wear schedule,  cleaning and application of this item were given.  Education provided also included gait training and safety precautions when using this device. Patient was instructed that should the item result in increased pain, decreased sensation, increased swelling, or an overall worsening of their medical condition, to please contact our office immediately.    Orthotic management and training was provided for skin care, modifications due to healing tissues, edema changes, interruption in skin integrity, and safety precautions with the orthosis.

## 2024-03-19 ENCOUNTER — PRE-ADMISSION TESTING (OUTPATIENT)
Dept: PREADMISSION TESTING | Facility: HOSPITAL | Age: 39
End: 2024-03-19
Payer: COMMERCIAL

## 2024-03-19 ENCOUNTER — TRANSCRIBE ORDERS (OUTPATIENT)
Dept: OPERATING ROOM | Facility: HOSPITAL | Age: 39
End: 2024-03-19

## 2024-03-19 VITALS
HEIGHT: 68 IN | OXYGEN SATURATION: 100 % | TEMPERATURE: 98.1 F | DIASTOLIC BLOOD PRESSURE: 76 MMHG | WEIGHT: 116.29 LBS | RESPIRATION RATE: 12 BRPM | SYSTOLIC BLOOD PRESSURE: 120 MMHG | BODY MASS INDEX: 17.63 KG/M2 | HEART RATE: 61 BPM

## 2024-03-19 DIAGNOSIS — Z01.818 PREOPERATIVE TESTING: Primary | ICD-10-CM

## 2024-03-19 DIAGNOSIS — M25.852 FEMOROACETABULAR IMPINGEMENT OF LEFT HIP: ICD-10-CM

## 2024-03-19 LAB
B-HCG SERPL-ACNC: <2 MIU/ML
BASOPHILS # BLD AUTO: 0.02 X10*3/UL (ref 0–0.1)
BASOPHILS NFR BLD AUTO: 0.5 %
EOSINOPHIL # BLD AUTO: 0.03 X10*3/UL (ref 0–0.7)
EOSINOPHIL NFR BLD AUTO: 0.8 %
ERYTHROCYTE [DISTWIDTH] IN BLOOD BY AUTOMATED COUNT: 13 % (ref 11.5–14.5)
HCT VFR BLD AUTO: 38.4 % (ref 36–46)
HGB BLD-MCNC: 12.5 G/DL (ref 12–16)
IMM GRANULOCYTES # BLD AUTO: 0.01 X10*3/UL (ref 0–0.7)
IMM GRANULOCYTES NFR BLD AUTO: 0.3 % (ref 0–0.9)
LYMPHOCYTES # BLD AUTO: 1.22 X10*3/UL (ref 1.2–4.8)
LYMPHOCYTES NFR BLD AUTO: 32.2 %
MCH RBC QN AUTO: 30.9 PG (ref 26–34)
MCHC RBC AUTO-ENTMCNC: 32.6 G/DL (ref 32–36)
MCV RBC AUTO: 95 FL (ref 80–100)
MONOCYTES # BLD AUTO: 0.41 X10*3/UL (ref 0.1–1)
MONOCYTES NFR BLD AUTO: 10.8 %
NEUTROPHILS # BLD AUTO: 2.1 X10*3/UL (ref 1.2–7.7)
NEUTROPHILS NFR BLD AUTO: 55.4 %
NRBC BLD-RTO: ABNORMAL /100{WBCS}
PLATELET # BLD AUTO: 169 X10*3/UL (ref 150–450)
RBC # BLD AUTO: 4.05 X10*6/UL (ref 4–5.2)
WBC # BLD AUTO: 3.8 X10*3/UL (ref 4.4–11.3)

## 2024-03-19 PROCEDURE — 93005 ELECTROCARDIOGRAM TRACING: CPT

## 2024-03-19 PROCEDURE — 99203 OFFICE O/P NEW LOW 30 MIN: CPT | Performed by: NURSE PRACTITIONER

## 2024-03-19 PROCEDURE — 85025 COMPLETE CBC W/AUTO DIFF WBC: CPT

## 2024-03-19 PROCEDURE — 84702 CHORIONIC GONADOTROPIN TEST: CPT

## 2024-03-19 PROCEDURE — 36415 COLL VENOUS BLD VENIPUNCTURE: CPT

## 2024-03-19 PROCEDURE — 93010 ELECTROCARDIOGRAM REPORT: CPT | Performed by: INTERNAL MEDICINE

## 2024-03-19 ASSESSMENT — ENCOUNTER SYMPTOMS
NEUROLOGICAL NEGATIVE: 1
ENDOCRINE NEGATIVE: 1
EYES NEGATIVE: 1
GASTROINTESTINAL NEGATIVE: 1
ALLERGIC/IMMUNOLOGIC NEGATIVE: 1
PSYCHIATRIC NEGATIVE: 1
CONSTITUTIONAL NEGATIVE: 1

## 2024-03-19 ASSESSMENT — PAIN DESCRIPTION - DESCRIPTORS: DESCRIPTORS: SHARP;RADIATING

## 2024-03-19 ASSESSMENT — PAIN SCALES - GENERAL: PAINLEVEL_OUTOF10: 6

## 2024-03-19 ASSESSMENT — PAIN - FUNCTIONAL ASSESSMENT: PAIN_FUNCTIONAL_ASSESSMENT: 0-10

## 2024-03-19 NOTE — CPM/PAT H&P
CPM/PAT Evaluation     Christine Bustos is a 38 y.o. female   Chief Complaint: hip pain having surgery torn labrum    HPI:  Patient is a 39 y/o alert and oriented female coming in for PAT for a scheduled Hip Arthroscopy, Labral Repair, Rim Trim Osteoplasty, Capsular Plication on 3/26/24 w/ Dr. Cruz.    The patient reports 6/10 sharp, shooting hip pain that radiates to her knee.  She states walking after sitting exacerbates the pain.  She states heat helps the pain.  She has had physical therapy.  She has not had any injections.    Patient denies chest pain, SOB, MAGAÑA and NVDC.    Patient also denies Hx: DVT/PE.    Current medications were reviewed and a presurgical mediation schedule was provided.  She has no questions at this time.   Past Medical History:   Diagnosis Date    Acute renal failure (CMS/HCC)     as a child    Asthma     exercise or infection induced    Delayed emergence from general anesthesia     The last time I was put under anesthesia was in 2017 or 2018 and they always tell me to not go back to sleep and constantly come over to me because I try going back to sleep    Endometriosis     Fissure, anal 2006    PFO (patent foramen ovale)     last checked 2023 checked- ok      Past Surgical History:   Procedure Laterality Date    ADENOIDECTOMY      OTHER SURGICAL HISTORY  01/20/2016    wisdom teeth    OTHER SURGICAL HISTORY  01/20/2016    Laparoscopic Excision Of Ectopic Preg & bilateral tubes    OTHER SURGICAL HISTORY  11/03/2017    Laparos Large Intest Laser Vaporization Endometriotic Tissue    OTHER SURGICAL HISTORY      excision  of endometrial tissue    OTHER SURGICAL HISTORY      right axilla lymph node biopsy - neg    TONSILLECTOMY      TUBAL LIGATION          Allergies   Allergen Reactions    Ciprofloxacin Other     IV only when with flagyl skin was burning and red, oral ok    Flagyl [Metronidazole] Other     With cipro with IV burning skin hot and red, oral ok    Motrin [Ibuprofen] Other     As a   child had acute renal failure        Current Outpatient Medications on File Prior to Visit   Medication Sig Dispense Refill    albuterol 90 mcg/actuation inhaler Inhale 2 puffs every 4 hours if needed.      cholecalciferol (Vitamin D-3) 5,000 Units tablet Take 1 tablet (5,000 Units) by mouth once daily.      multivitamin tablet Take 1 tablet by mouth once daily.      Pulmicort Flexhaler 180 mcg/actuation inhaler Inhale 1 puff twice a day.       No current facility-administered medications on file prior to visit.       Vitals:    03/19/24 0713   BP: 120/76   Pulse: 61   Resp: 12   Temp: 36.7 °C (98.1 °F)   SpO2: 100%       Review of Systems   Constitutional: Negative.    HENT: Negative.     Eyes: Negative.    Respiratory:          Mild persistent asthma - stable    Cardiovascular:         PFO - stable    Gastrointestinal: Negative.    Endocrine: Negative.    Genitourinary:         Acute renal failure as child - stable bun & Creat   Musculoskeletal:         See hpi for details   Skin: Negative.    Allergic/Immunologic: Negative.    Neurological: Negative.    Hematological:         Negative right breast lymph node biopsy 2/2024   Psychiatric/Behavioral: Negative.        Physical Exam  Vitals and nursing note reviewed.   Constitutional:       Appearance: Normal appearance.   HENT:      Head: Normocephalic and atraumatic.      Mouth/Throat:      Mouth: Mucous membranes are moist.      Pharynx: Oropharynx is clear.   Eyes:      Pupils: Pupils are equal, round, and reactive to light.   Cardiovascular:      Rate and Rhythm: Normal rate and regular rhythm.      Heart sounds: Normal heart sounds.      Comments: EKG today is NSR w/ Sinus Arrhythmia rate of 62  Pulmonary:      Effort: Pulmonary effort is normal.      Breath sounds: Normal breath sounds.   Abdominal:      General: Bowel sounds are normal.      Palpations: Abdomen is soft.   Musculoskeletal:         General: Normal range of motion.      Cervical back: Normal  range of motion.   Skin:     General: Skin is warm and dry.   Neurological:      General: No focal deficit present.      Mental Status: She is alert and oriented to person, place, and time.   Psychiatric:         Mood and Affect: Mood normal.         Behavior: Behavior normal.         Thought Content: Thought content normal.         Judgment: Judgment normal.        PAT AIRWAY:   Airway:     Mallampati::  IV    TM distance::  >3 FB    Neck ROM::  Full  No dental issues has own teeth  Does not smoke  No alcohol or drug use  Patient is slow to wake up from anesthesia and reports having a hard time walking after anesthesia  No family issues with anesthesia    Assessment and Plan:   Tear of Left Acetabular Labrum, Femoroacetabular Impingement of Left Hip, Loose Body In Left Hip  Hip Arthroscopy, Labral Repair, Rim Trim Osteoplasty, Capsular Plication    PFO  Stable    She does not need cardiac clearance for a PFO per cardiology    Ashtma  Managed with albuterol 2 puffs q4hprn  Managed with pulmicort inhaler 1 puff bid  Follow up w/ pulmonology as scheduled last appt 3/19/2024  Spirometry normal 6/16/2023  Stable no recent flares    Benign liver cysts  Stable     Enlarged axillary lymph node -right breast  Biopsy 2/7/2024 follicular hyperplasia with no morphologic evidence of lymphoma or carcinoma    ASA II  RCRI - 0 points  Class I Risk 3.9%  DELFINO -1  points low Risk for AMNA   NSQIP - Predicted length of stay 0-1 days  ARISCAT -  points Low Risk 1.6%  DASI 34.7 Points 7.01 Mets  DEANN - 0.1%  JHFRAT - 0 points no risk for falls  Clearance - not indicated  PAT Testing - EKG   CBC & HCG per Dr. Cruz's PA-C  CMP stable 3/12/24  TSH normal 3/12/24  Face to Face patient contact time 30 minutes    HOLLY Forrester-CNP 3/19/2024 7:34 AM  Results for orders placed or performed in visit on 03/19/24 (from the past 24 hour(s))   ECG 12 Lead   Result Value Ref Range    Ventricular Rate 62 BPM    Atrial Rate 62 BPM    WY Interval  186 ms    QRS Duration 76 ms    QT Interval 396 ms    QTC Calculation(Bazett) 401 ms    P Axis 70 degrees    R Axis 77 degrees    T Axis 71 degrees    QRS Count 10 beats    Q Onset 222 ms    P Onset 129 ms    P Offset 181 ms    T Offset 420 ms    QTC Fredericia 400 ms   CBC and Auto Differential   Result Value Ref Range    WBC 3.8 (L) 4.4 - 11.3 x10*3/uL    nRBC      RBC 4.05 4.00 - 5.20 x10*6/uL    Hemoglobin 12.5 12.0 - 16.0 g/dL    Hematocrit 38.4 36.0 - 46.0 %    MCV 95 80 - 100 fL    MCH 30.9 26.0 - 34.0 pg    MCHC 32.6 32.0 - 36.0 g/dL    RDW 13.0 11.5 - 14.5 %    Platelets 169 150 - 450 x10*3/uL    Neutrophils % 55.4 40.0 - 80.0 %    Immature Granulocytes %, Automated 0.3 0.0 - 0.9 %    Lymphocytes % 32.2 13.0 - 44.0 %    Monocytes % 10.8 2.0 - 10.0 %    Eosinophils % 0.8 0.0 - 6.0 %    Basophils % 0.5 0.0 - 2.0 %    Neutrophils Absolute 2.10 1.20 - 7.70 x10*3/uL    Immature Granulocytes Absolute, Automated 0.01 0.00 - 0.70 x10*3/uL    Lymphocytes Absolute 1.22 1.20 - 4.80 x10*3/uL    Monocytes Absolute 0.41 0.10 - 1.00 x10*3/uL    Eosinophils Absolute 0.03 0.00 - 0.70 x10*3/uL    Basophils Absolute 0.02 0.00 - 0.10 x10*3/uL

## 2024-03-19 NOTE — PREPROCEDURE INSTRUCTIONS
Medication List            Accurate as of March 19, 2024  7:11 AM. Always use your most recent med list.                albuterol 90 mcg/actuation inhaler  Medication Adjustments for Surgery: Other (Comment)  Notes to patient: Take as needed     cholecalciferol 5,000 Units tablet  Commonly known as: Vitamin D-3  Medication Adjustments for Surgery: Stop 7 days before surgery     multivitamin tablet  Medication Adjustments for Surgery: Stop 7 days before surgery     Pulmicort Flexhaler 180 mcg/actuation inhaler  Generic drug: budesonide  Medication Adjustments for Surgery: Other (Comment)  Notes to patient: take                              NPO Instructions:    Do not eat any food after midnight the night before your surgery/procedure.    Additional Instructions:     Seven/Six Days before Surgery:  Review your medication instructions, stop indicated medications  Five Days before Surgery:  Review your medication instructions, stop indicated medications  Three Days before Surgery:  Review your medication instructions, stop indicated medications  The Day before Surgery:  Review your medication instructions, stop indicated medications  You will be contacted regarding the time of your arrival to facility and surgery time  Do not eat any food after Midnight  Day of Surgery:  Review your medication instructions, take indicated medications  Wear  comfortable loose fitting clothing  Do not use moisturizers, creams, lotions or perfume  All jewelry and valuables should be left at home  PAT DISCHARGE INSTRUCTIONS    Please call the Same Day Surgery (SDS) Department of the hospital where your procedure will be performed between 2:00- 3:30 PM the day before your surgery. If you are scheduled on a Monday, or a Tuesday following a Monday holiday, you will need to call on the last business day prior to your surgery.    [facility]  [address] [place] [address]  [phone]    Please  let your surgeon know if:      You develop any open sores, shingles, burning or painful urination as these may increase your risk of an infection.   You no longer wish to have the surgery.   Any other personal circumstances change that may lead to the need to cancel or defer this surgery-such as being sick or getting admitted to any hospital within one week of your planned procedure.    Your contact details change, such as a change of address or phone number.    Starting now:     Please DO NOT drink alcohol or smoke for 24 hours before surgery. It is well known that quitting smoking can make a huge difference to your health and recovery from surgery. The longer you abstain from smoking, the better your chances of a healthy recovery. If you need help with quitting, call 1-800-QUIT-NOW to be connected to a trained counselor who will discuss the best methods to help you quit.     Before your surgery:    Please stop all supplements 7 days prior to surgery. Or as directed by your surgeon.   Please stop taking NSAID pain medicine such as Advil and Motrin 7 days before surgery.    If you develop any fever, cough, cold, rashes, cuts, scratches, scrapes, urinary symptoms or infection anywhere on your body (including teeth and gums) prior to surgery, please call your surgeon’s office as soon as possible. This may require treatment to reduce the chance of cancellation on the day of surgery.    The day before your surgery:   DIET- Do not eat any food after MIDNIGHT.   Get a good night’s rest.  Use the special soap for bathing if you have been instructed to use one.    Scheduled surgery times may change and you will be notified if this occurs - please check your personal voicemail for any updates.     On the morning of surgery:   Wear comfortable, loose fitting clothes which open in the front. Please do not wear moisturizers, creams, lotions, makeup or perfume.    Please bring with you to surgery:   Photo ID and insurance card    Current list of medicines and allergies   Pacemaker/ Defibrillator/Heart stent cards   CPAP machine and mask    Slings/ splints/ crutches   A copy of your complete advanced directive/DHPOA.    Please do NOT bring with you to surgery:   All jewelry and valuables should be left at home.   Prosthetic devices such as contact lenses, hearing aids, dentures, eyelash extensions, hairpins and body piercings must be removed prior to going in to the surgical suite.    After outpatient surgery:   A responsible adult MUST accompany you at the time of discharge and stay with you for 24 hours after your surgery. You may NOT drive yourself home after surgery.    Do not drive, operate machinery, make critical decisions or do activities that require co-ordination or balance until after a night’s sleep.   Do not drink alcoholic beverages for 24 hours.   Instructions for resuming your medications will be provided by your surgeon.    CALL YOUR DOCTOR AFTER SURGERY IF YOU HAVE:     Chills and/or a fever of 101° F or higher.    Redness, swelling, pus or drainage from your surgical wound or a bad smell from the wound.    Lightheadedness, fainting or confusion.    Persistent vomiting (throwing up) and are not able to eat or drink for 12 hours.    Three or more loose, watery bowel movements in 24 hours (diarrhea).   Difficulty or pain while urinating( after non-urological surgery)    Pain and swelling in your legs, especially if it is only on one side.    Difficulty breathing or are breathing faster than normal.    Any new concerning symptoms.      Reviewed pre-op instructions with patient, states understanding and denies further questions at this time.    If you have not received a call regarding your arrival time for surgery by 2pm on the day before surgery, you can call [phone].    Take Care

## 2024-03-19 NOTE — H&P (VIEW-ONLY)
CPM/PAT Evaluation     Christine Bustos is a 38 y.o. female   Chief Complaint: hip pain having surgery torn labrum    HPI:  Patient is a 37 y/o alert and oriented female coming in for PAT for a scheduled Hip Arthroscopy, Labral Repair, Rim Trim Osteoplasty, Capsular Plication on 3/26/24 w/ Dr. Cruz.    The patient reports 6/10 sharp, shooting hip pain that radiates to her knee.  She states walking after sitting exacerbates the pain.  She states heat helps the pain.  She has had physical therapy.  She has not had any injections.    Patient denies chest pain, SOB, MAGAÑA and NVDC.    Patient also denies Hx: DVT/PE.    Current medications were reviewed and a presurgical mediation schedule was provided.  She has no questions at this time.   Past Medical History:   Diagnosis Date    Acute renal failure (CMS/HCC)     as a child    Asthma     exercise or infection induced    Delayed emergence from general anesthesia     The last time I was put under anesthesia was in 2017 or 2018 and they always tell me to not go back to sleep and constantly come over to me because I try going back to sleep    Endometriosis     Fissure, anal 2006    PFO (patent foramen ovale)     last checked 2023 checked- ok      Past Surgical History:   Procedure Laterality Date    ADENOIDECTOMY      OTHER SURGICAL HISTORY  01/20/2016    wisdom teeth    OTHER SURGICAL HISTORY  01/20/2016    Laparoscopic Excision Of Ectopic Preg & bilateral tubes    OTHER SURGICAL HISTORY  11/03/2017    Laparos Large Intest Laser Vaporization Endometriotic Tissue    OTHER SURGICAL HISTORY      excision  of endometrial tissue    OTHER SURGICAL HISTORY      right axilla lymph node biopsy - neg    TONSILLECTOMY      TUBAL LIGATION          Allergies   Allergen Reactions    Ciprofloxacin Other     IV only when with flagyl skin was burning and red, oral ok    Flagyl [Metronidazole] Other     With cipro with IV burning skin hot and red, oral ok    Motrin [Ibuprofen] Other     As a   child had acute renal failure        Current Outpatient Medications on File Prior to Visit   Medication Sig Dispense Refill    albuterol 90 mcg/actuation inhaler Inhale 2 puffs every 4 hours if needed.      cholecalciferol (Vitamin D-3) 5,000 Units tablet Take 1 tablet (5,000 Units) by mouth once daily.      multivitamin tablet Take 1 tablet by mouth once daily.      Pulmicort Flexhaler 180 mcg/actuation inhaler Inhale 1 puff twice a day.       No current facility-administered medications on file prior to visit.       Vitals:    03/19/24 0713   BP: 120/76   Pulse: 61   Resp: 12   Temp: 36.7 °C (98.1 °F)   SpO2: 100%       Review of Systems   Constitutional: Negative.    HENT: Negative.     Eyes: Negative.    Respiratory:          Mild persistent asthma - stable    Cardiovascular:         PFO - stable    Gastrointestinal: Negative.    Endocrine: Negative.    Genitourinary:         Acute renal failure as child - stable bun & Creat   Musculoskeletal:         See hpi for details   Skin: Negative.    Allergic/Immunologic: Negative.    Neurological: Negative.    Hematological:         Negative right breast lymph node biopsy 2/2024   Psychiatric/Behavioral: Negative.        Physical Exam  Vitals and nursing note reviewed.   Constitutional:       Appearance: Normal appearance.   HENT:      Head: Normocephalic and atraumatic.      Mouth/Throat:      Mouth: Mucous membranes are moist.      Pharynx: Oropharynx is clear.   Eyes:      Pupils: Pupils are equal, round, and reactive to light.   Cardiovascular:      Rate and Rhythm: Normal rate and regular rhythm.      Heart sounds: Normal heart sounds.      Comments: EKG today is NSR w/ Sinus Arrhythmia rate of 62  Pulmonary:      Effort: Pulmonary effort is normal.      Breath sounds: Normal breath sounds.   Abdominal:      General: Bowel sounds are normal.      Palpations: Abdomen is soft.   Musculoskeletal:         General: Normal range of motion.      Cervical back: Normal  range of motion.   Skin:     General: Skin is warm and dry.   Neurological:      General: No focal deficit present.      Mental Status: She is alert and oriented to person, place, and time.   Psychiatric:         Mood and Affect: Mood normal.         Behavior: Behavior normal.         Thought Content: Thought content normal.         Judgment: Judgment normal.        PAT AIRWAY:   Airway:     Mallampati::  IV    TM distance::  >3 FB    Neck ROM::  Full  No dental issues has own teeth  Does not smoke  No alcohol or drug use  Patient is slow to wake up from anesthesia and reports having a hard time walking after anesthesia  No family issues with anesthesia    Assessment and Plan:   Tear of Left Acetabular Labrum, Femoroacetabular Impingement of Left Hip, Loose Body In Left Hip  Hip Arthroscopy, Labral Repair, Rim Trim Osteoplasty, Capsular Plication    PFO  Stable    She does not need cardiac clearance for a PFO per cardiology    Ashtma  Managed with albuterol 2 puffs q4hprn  Managed with pulmicort inhaler 1 puff bid  Follow up w/ pulmonology as scheduled last appt 3/19/2024  Spirometry normal 6/16/2023  Stable no recent flares    Benign liver cysts  Stable     Enlarged axillary lymph node -right breast  Biopsy 2/7/2024 follicular hyperplasia with no morphologic evidence of lymphoma or carcinoma    ASA II  RCRI - 0 points  Class I Risk 3.9%  DELFINO -1  points low Risk for AMNA   NSQIP - Predicted length of stay 0-1 days  ARISCAT -  points Low Risk 1.6%  DASI 34.7 Points 7.01 Mets  DEANN - 0.1%  JHFRAT - 0 points no risk for falls  Clearance - not indicated  PAT Testing - EKG   CBC & HCG per Dr. Cruz's PA-C  CMP stable 3/12/24  TSH normal 3/12/24  Face to Face patient contact time 30 minutes    HOLLY Forrester-CNP 3/19/2024 7:34 AM  Results for orders placed or performed in visit on 03/19/24 (from the past 24 hour(s))   ECG 12 Lead   Result Value Ref Range    Ventricular Rate 62 BPM    Atrial Rate 62 BPM    LA Interval  186 ms    QRS Duration 76 ms    QT Interval 396 ms    QTC Calculation(Bazett) 401 ms    P Axis 70 degrees    R Axis 77 degrees    T Axis 71 degrees    QRS Count 10 beats    Q Onset 222 ms    P Onset 129 ms    P Offset 181 ms    T Offset 420 ms    QTC Fredericia 400 ms   CBC and Auto Differential   Result Value Ref Range    WBC 3.8 (L) 4.4 - 11.3 x10*3/uL    nRBC      RBC 4.05 4.00 - 5.20 x10*6/uL    Hemoglobin 12.5 12.0 - 16.0 g/dL    Hematocrit 38.4 36.0 - 46.0 %    MCV 95 80 - 100 fL    MCH 30.9 26.0 - 34.0 pg    MCHC 32.6 32.0 - 36.0 g/dL    RDW 13.0 11.5 - 14.5 %    Platelets 169 150 - 450 x10*3/uL    Neutrophils % 55.4 40.0 - 80.0 %    Immature Granulocytes %, Automated 0.3 0.0 - 0.9 %    Lymphocytes % 32.2 13.0 - 44.0 %    Monocytes % 10.8 2.0 - 10.0 %    Eosinophils % 0.8 0.0 - 6.0 %    Basophils % 0.5 0.0 - 2.0 %    Neutrophils Absolute 2.10 1.20 - 7.70 x10*3/uL    Immature Granulocytes Absolute, Automated 0.01 0.00 - 0.70 x10*3/uL    Lymphocytes Absolute 1.22 1.20 - 4.80 x10*3/uL    Monocytes Absolute 0.41 0.10 - 1.00 x10*3/uL    Eosinophils Absolute 0.03 0.00 - 0.70 x10*3/uL    Basophils Absolute 0.02 0.00 - 0.10 x10*3/uL

## 2024-03-22 LAB
ATRIAL RATE: 62 BPM
P AXIS: 70 DEGREES
P OFFSET: 181 MS
P ONSET: 129 MS
PR INTERVAL: 186 MS
Q ONSET: 222 MS
QRS COUNT: 10 BEATS
QRS DURATION: 76 MS
QT INTERVAL: 396 MS
QTC CALCULATION(BAZETT): 401 MS
QTC FREDERICIA: 400 MS
R AXIS: 77 DEGREES
T AXIS: 71 DEGREES
T OFFSET: 420 MS
VENTRICULAR RATE: 62 BPM

## 2024-03-25 ENCOUNTER — ANESTHESIA EVENT (OUTPATIENT)
Dept: OPERATING ROOM | Facility: HOSPITAL | Age: 39
End: 2024-03-25
Payer: COMMERCIAL

## 2024-03-25 RX ORDER — SODIUM CHLORIDE, SODIUM LACTATE, POTASSIUM CHLORIDE, CALCIUM CHLORIDE 600; 310; 30; 20 MG/100ML; MG/100ML; MG/100ML; MG/100ML
100 INJECTION, SOLUTION INTRAVENOUS CONTINUOUS
Status: CANCELLED | OUTPATIENT
Start: 2024-03-25

## 2024-03-26 ENCOUNTER — APPOINTMENT (OUTPATIENT)
Dept: RADIOLOGY | Facility: HOSPITAL | Age: 39
End: 2024-03-26
Payer: COMMERCIAL

## 2024-03-26 ENCOUNTER — ANESTHESIA (OUTPATIENT)
Dept: OPERATING ROOM | Facility: HOSPITAL | Age: 39
End: 2024-03-26
Payer: COMMERCIAL

## 2024-03-26 ENCOUNTER — HOSPITAL ENCOUNTER (OUTPATIENT)
Facility: HOSPITAL | Age: 39
Setting detail: OUTPATIENT SURGERY
Discharge: HOME | End: 2024-03-26
Attending: ORTHOPAEDIC SURGERY | Admitting: ORTHOPAEDIC SURGERY
Payer: COMMERCIAL

## 2024-03-26 VITALS
HEIGHT: 68 IN | SYSTOLIC BLOOD PRESSURE: 126 MMHG | OXYGEN SATURATION: 99 % | HEART RATE: 84 BPM | BODY MASS INDEX: 17.31 KG/M2 | WEIGHT: 114.2 LBS | TEMPERATURE: 97.2 F | RESPIRATION RATE: 18 BRPM | DIASTOLIC BLOOD PRESSURE: 89 MMHG

## 2024-03-26 DIAGNOSIS — M25.852 FEMOROACETABULAR IMPINGEMENT OF LEFT HIP: Primary | ICD-10-CM

## 2024-03-26 PROCEDURE — 64450 NJX AA&/STRD OTHER PN/BRANCH: CPT | Performed by: ANESTHESIOLOGY

## 2024-03-26 PROCEDURE — 29915 HIP ARTHRO ACETABULOPLASTY: CPT | Performed by: PHYSICIAN ASSISTANT

## 2024-03-26 PROCEDURE — 29999 UNLISTED PX ARTHROSCOPY: CPT | Performed by: PHYSICIAN ASSISTANT

## 2024-03-26 PROCEDURE — 7100000010 HC PHASE TWO TIME - EACH INCREMENTAL 1 MINUTE: Performed by: ORTHOPAEDIC SURGERY

## 2024-03-26 PROCEDURE — 2500000004 HC RX 250 GENERAL PHARMACY W/ HCPCS (ALT 636 FOR OP/ED): Performed by: ANESTHESIOLOGY

## 2024-03-26 PROCEDURE — 29999 UNLISTED PX ARTHROSCOPY: CPT | Performed by: ORTHOPAEDIC SURGERY

## 2024-03-26 PROCEDURE — 3600000009 HC OR TIME - EACH INCREMENTAL 1 MINUTE - PROCEDURE LEVEL FOUR: Performed by: ORTHOPAEDIC SURGERY

## 2024-03-26 PROCEDURE — 2780000003 HC OR 278 NO HCPCS: Performed by: ORTHOPAEDIC SURGERY

## 2024-03-26 PROCEDURE — 2500000004 HC RX 250 GENERAL PHARMACY W/ HCPCS (ALT 636 FOR OP/ED): Performed by: NURSE ANESTHETIST, CERTIFIED REGISTERED

## 2024-03-26 PROCEDURE — 2500000001 HC RX 250 WO HCPCS SELF ADMINISTERED DRUGS (ALT 637 FOR MEDICARE OP): Performed by: PHYSICIAN ASSISTANT

## 2024-03-26 PROCEDURE — 7100000002 HC RECOVERY ROOM TIME - EACH INCREMENTAL 1 MINUTE: Performed by: ORTHOPAEDIC SURGERY

## 2024-03-26 PROCEDURE — C1713 ANCHOR/SCREW BN/BN,TIS/BN: HCPCS | Performed by: ORTHOPAEDIC SURGERY

## 2024-03-26 PROCEDURE — 2720000007 HC OR 272 NO HCPCS: Performed by: ORTHOPAEDIC SURGERY

## 2024-03-26 PROCEDURE — 2500000004 HC RX 250 GENERAL PHARMACY W/ HCPCS (ALT 636 FOR OP/ED): Performed by: ORTHOPAEDIC SURGERY

## 2024-03-26 PROCEDURE — 29916 HIP ARTHRO W/LABRAL REPAIR: CPT | Performed by: ORTHOPAEDIC SURGERY

## 2024-03-26 PROCEDURE — 29861 HIP ARTHRO W/FB REMOVAL: CPT | Performed by: ORTHOPAEDIC SURGERY

## 2024-03-26 PROCEDURE — 7100000009 HC PHASE TWO TIME - INITIAL BASE CHARGE: Performed by: ORTHOPAEDIC SURGERY

## 2024-03-26 PROCEDURE — 3700000001 HC GENERAL ANESTHESIA TIME - INITIAL BASE CHARGE: Performed by: ORTHOPAEDIC SURGERY

## 2024-03-26 PROCEDURE — A29916 PR ARTHROSCOPY HIP W/LABRAL REPAIR: Performed by: NURSE ANESTHETIST, CERTIFIED REGISTERED

## 2024-03-26 PROCEDURE — 29914 HIP ARTHRO W/FEMOROPLASTY: CPT | Performed by: PHYSICIAN ASSISTANT

## 2024-03-26 PROCEDURE — 7100000001 HC RECOVERY ROOM TIME - INITIAL BASE CHARGE: Performed by: ORTHOPAEDIC SURGERY

## 2024-03-26 PROCEDURE — 2500000005 HC RX 250 GENERAL PHARMACY W/O HCPCS: Performed by: NURSE ANESTHETIST, CERTIFIED REGISTERED

## 2024-03-26 PROCEDURE — 2500000005 HC RX 250 GENERAL PHARMACY W/O HCPCS: Performed by: ANESTHESIOLOGY

## 2024-03-26 PROCEDURE — 29861 HIP ARTHRO W/FB REMOVAL: CPT | Performed by: PHYSICIAN ASSISTANT

## 2024-03-26 PROCEDURE — A29916 PR ARTHROSCOPY HIP W/LABRAL REPAIR: Performed by: ANESTHESIOLOGY

## 2024-03-26 PROCEDURE — 3700000002 HC GENERAL ANESTHESIA TIME - EACH INCREMENTAL 1 MINUTE: Performed by: ORTHOPAEDIC SURGERY

## 2024-03-26 PROCEDURE — 2500000005 HC RX 250 GENERAL PHARMACY W/O HCPCS: Performed by: ORTHOPAEDIC SURGERY

## 2024-03-26 PROCEDURE — 29914 HIP ARTHRO W/FEMOROPLASTY: CPT | Performed by: ORTHOPAEDIC SURGERY

## 2024-03-26 PROCEDURE — 2500000004 HC RX 250 GENERAL PHARMACY W/ HCPCS (ALT 636 FOR OP/ED): Performed by: PHYSICIAN ASSISTANT

## 2024-03-26 PROCEDURE — 3600000004 HC OR TIME - INITIAL BASE CHARGE - PROCEDURE LEVEL FOUR: Performed by: ORTHOPAEDIC SURGERY

## 2024-03-26 PROCEDURE — 29915 HIP ARTHRO ACETABULOPLASTY: CPT | Performed by: ORTHOPAEDIC SURGERY

## 2024-03-26 DEVICE — NANOTACK TT SUTURE ANCHOR, 1.4MM WITH 1.2MM XBRAID TT
Type: IMPLANTABLE DEVICE | Site: HIP | Status: FUNCTIONAL
Brand: NANOTACK

## 2024-03-26 RX ORDER — METHOCARBAMOL 750 MG/1
750 TABLET, FILM COATED ORAL 4 TIMES DAILY
Qty: 30 TABLET | Refills: 0 | Status: SHIPPED | OUTPATIENT
Start: 2024-03-26 | End: 2024-04-10

## 2024-03-26 RX ORDER — PROPOFOL 10 MG/ML
INJECTION, EMULSION INTRAVENOUS AS NEEDED
Status: DISCONTINUED | OUTPATIENT
Start: 2024-03-26 | End: 2024-03-26

## 2024-03-26 RX ORDER — MIDAZOLAM HYDROCHLORIDE 1 MG/ML
INJECTION, SOLUTION INTRAMUSCULAR; INTRAVENOUS AS NEEDED
Status: DISCONTINUED | OUTPATIENT
Start: 2024-03-26 | End: 2024-03-26

## 2024-03-26 RX ORDER — ASPIRIN 81 MG/1
81 TABLET ORAL 2 TIMES DAILY
Qty: 28 TABLET | Refills: 0 | Status: SHIPPED | OUTPATIENT
Start: 2024-03-26 | End: 2024-04-09

## 2024-03-26 RX ORDER — LABETALOL HYDROCHLORIDE 5 MG/ML
5 INJECTION, SOLUTION INTRAVENOUS ONCE AS NEEDED
Status: DISCONTINUED | OUTPATIENT
Start: 2024-03-26 | End: 2024-03-26 | Stop reason: HOSPADM

## 2024-03-26 RX ORDER — NAPROXEN SODIUM 220 MG/1
81 TABLET, FILM COATED ORAL 2 TIMES DAILY
Qty: 28 TABLET | Refills: 0 | OUTPATIENT
Start: 2024-03-26 | End: 2024-04-09

## 2024-03-26 RX ORDER — ONDANSETRON 4 MG/1
4 TABLET, FILM COATED ORAL EVERY 8 HOURS PRN
Qty: 30 TABLET | Refills: 0 | Status: SHIPPED | OUTPATIENT
Start: 2024-03-26 | End: 2024-04-05

## 2024-03-26 RX ORDER — OXYCODONE AND ACETAMINOPHEN 5; 325 MG/1; MG/1
1 TABLET ORAL SEE ADMIN INSTRUCTIONS
Qty: 16 TABLET | Refills: 0 | OUTPATIENT
Start: 2024-03-26 | End: 2024-03-29

## 2024-03-26 RX ORDER — MEPERIDINE HYDROCHLORIDE 25 MG/ML
12.5 INJECTION INTRAMUSCULAR; INTRAVENOUS; SUBCUTANEOUS EVERY 10 MIN PRN
Status: DISCONTINUED | OUTPATIENT
Start: 2024-03-26 | End: 2024-03-26 | Stop reason: HOSPADM

## 2024-03-26 RX ORDER — ALBUTEROL SULFATE 0.83 MG/ML
2.5 SOLUTION RESPIRATORY (INHALATION) ONCE AS NEEDED
Status: DISCONTINUED | OUTPATIENT
Start: 2024-03-26 | End: 2024-03-26 | Stop reason: HOSPADM

## 2024-03-26 RX ORDER — SODIUM CHLORIDE, SODIUM LACTATE, POTASSIUM CHLORIDE, CALCIUM CHLORIDE 600; 310; 30; 20 MG/100ML; MG/100ML; MG/100ML; MG/100ML
100 INJECTION, SOLUTION INTRAVENOUS CONTINUOUS
Status: DISCONTINUED | OUTPATIENT
Start: 2024-03-26 | End: 2024-03-26 | Stop reason: HOSPADM

## 2024-03-26 RX ORDER — BUPIVACAINE HYDROCHLORIDE 2.5 MG/ML
INJECTION, SOLUTION INFILTRATION; PERINEURAL AS NEEDED
Status: DISCONTINUED | OUTPATIENT
Start: 2024-03-26 | End: 2024-03-26

## 2024-03-26 RX ORDER — GABAPENTIN 300 MG/1
600 CAPSULE ORAL ONCE
Status: COMPLETED | OUTPATIENT
Start: 2024-03-26 | End: 2024-03-26

## 2024-03-26 RX ORDER — ACETAMINOPHEN 325 MG/1
975 TABLET ORAL ONCE
Status: COMPLETED | OUTPATIENT
Start: 2024-03-26 | End: 2024-03-26

## 2024-03-26 RX ORDER — ONDANSETRON HYDROCHLORIDE 2 MG/ML
4 INJECTION, SOLUTION INTRAVENOUS ONCE AS NEEDED
Status: DISCONTINUED | OUTPATIENT
Start: 2024-03-26 | End: 2024-03-26 | Stop reason: HOSPADM

## 2024-03-26 RX ORDER — FENTANYL CITRATE 50 UG/ML
50 INJECTION, SOLUTION INTRAMUSCULAR; INTRAVENOUS ONCE AS NEEDED
Status: COMPLETED | OUTPATIENT
Start: 2024-03-26 | End: 2024-03-26

## 2024-03-26 RX ORDER — MIDAZOLAM HYDROCHLORIDE 1 MG/ML
1 INJECTION, SOLUTION INTRAMUSCULAR; INTRAVENOUS ONCE AS NEEDED
Status: COMPLETED | OUTPATIENT
Start: 2024-03-26 | End: 2024-03-26

## 2024-03-26 RX ORDER — BUPIVACAINE HYDROCHLORIDE 5 MG/ML
INJECTION, SOLUTION PERINEURAL AS NEEDED
Status: DISCONTINUED | OUTPATIENT
Start: 2024-03-26 | End: 2024-03-26 | Stop reason: HOSPADM

## 2024-03-26 RX ORDER — FENTANYL CITRATE 50 UG/ML
INJECTION, SOLUTION INTRAMUSCULAR; INTRAVENOUS AS NEEDED
Status: DISCONTINUED | OUTPATIENT
Start: 2024-03-26 | End: 2024-03-26

## 2024-03-26 RX ORDER — OXYCODONE AND ACETAMINOPHEN 5; 325 MG/1; MG/1
1 TABLET ORAL SEE ADMIN INSTRUCTIONS
Qty: 16 TABLET | Refills: 0 | Status: SHIPPED | OUTPATIENT
Start: 2024-03-26 | End: 2024-03-29

## 2024-03-26 RX ORDER — DEXAMETHASONE SODIUM PHOSPHATE 10 MG/ML
INJECTION INTRAMUSCULAR; INTRAVENOUS AS NEEDED
Status: DISCONTINUED | OUTPATIENT
Start: 2024-03-26 | End: 2024-03-26

## 2024-03-26 RX ORDER — PHENYLEPHRINE HCL IN 0.9% NACL 0.4MG/10ML
SYRINGE (ML) INTRAVENOUS AS NEEDED
Status: DISCONTINUED | OUTPATIENT
Start: 2024-03-26 | End: 2024-03-26

## 2024-03-26 RX ORDER — MORPHINE SULFATE 10 MG/ML
INJECTION, SOLUTION INTRAMUSCULAR; INTRAVENOUS AS NEEDED
Status: DISCONTINUED | OUTPATIENT
Start: 2024-03-26 | End: 2024-03-26 | Stop reason: HOSPADM

## 2024-03-26 RX ORDER — ONDANSETRON HYDROCHLORIDE 2 MG/ML
INJECTION, SOLUTION INTRAVENOUS AS NEEDED
Status: DISCONTINUED | OUTPATIENT
Start: 2024-03-26 | End: 2024-03-26

## 2024-03-26 RX ORDER — LORAZEPAM 2 MG/ML
0.5 INJECTION INTRAMUSCULAR ONCE AS NEEDED
Status: COMPLETED | OUTPATIENT
Start: 2024-03-26 | End: 2024-03-26

## 2024-03-26 RX ORDER — FENTANYL CITRATE 50 UG/ML
50 INJECTION, SOLUTION INTRAMUSCULAR; INTRAVENOUS EVERY 5 MIN PRN
Status: DISCONTINUED | OUTPATIENT
Start: 2024-03-26 | End: 2024-03-26 | Stop reason: HOSPADM

## 2024-03-26 RX ORDER — HYDRALAZINE HYDROCHLORIDE 20 MG/ML
5 INJECTION INTRAMUSCULAR; INTRAVENOUS EVERY 30 MIN PRN
Status: DISCONTINUED | OUTPATIENT
Start: 2024-03-26 | End: 2024-03-26 | Stop reason: HOSPADM

## 2024-03-26 RX ORDER — METHOCARBAMOL 750 MG/1
750 TABLET, FILM COATED ORAL 4 TIMES DAILY
Qty: 30 TABLET | Refills: 0 | OUTPATIENT
Start: 2024-03-26 | End: 2024-04-10

## 2024-03-26 RX ORDER — ONDANSETRON 4 MG/1
4 TABLET, FILM COATED ORAL EVERY 8 HOURS PRN
Qty: 30 TABLET | Refills: 0 | OUTPATIENT
Start: 2024-03-26 | End: 2024-04-05

## 2024-03-26 RX ORDER — LIDOCAINE HYDROCHLORIDE 10 MG/ML
INJECTION, SOLUTION EPIDURAL; INFILTRATION; INTRACAUDAL; PERINEURAL AS NEEDED
Status: DISCONTINUED | OUTPATIENT
Start: 2024-03-26 | End: 2024-03-26

## 2024-03-26 RX ORDER — CEFAZOLIN SODIUM 2 G/100ML
2 INJECTION, SOLUTION INTRAVENOUS ONCE
Status: COMPLETED | OUTPATIENT
Start: 2024-03-26 | End: 2024-03-26

## 2024-03-26 RX ORDER — ROCURONIUM BROMIDE 10 MG/ML
INJECTION, SOLUTION INTRAVENOUS AS NEEDED
Status: DISCONTINUED | OUTPATIENT
Start: 2024-03-26 | End: 2024-03-26

## 2024-03-26 RX ORDER — INDOMETHACIN 75 MG/1
75 CAPSULE, EXTENDED RELEASE ORAL
Qty: 10 CAPSULE | Refills: 0 | Status: SHIPPED | OUTPATIENT
Start: 2024-03-26 | End: 2024-04-05

## 2024-03-26 RX ADMIN — DEXAMETHASONE SODIUM PHOSPHATE 8 MG: 4 INJECTION, SOLUTION INTRAMUSCULAR; INTRAVENOUS at 11:31

## 2024-03-26 RX ADMIN — SODIUM CHLORIDE, SODIUM LACTATE, POTASSIUM CHLORIDE, AND CALCIUM CHLORIDE 100 ML/HR: 600; 310; 30; 20 INJECTION, SOLUTION INTRAVENOUS at 10:27

## 2024-03-26 RX ADMIN — FENTANYL CITRATE 100 MCG: 50 INJECTION INTRAMUSCULAR; INTRAVENOUS at 11:26

## 2024-03-26 RX ADMIN — FENTANYL CITRATE 50 MCG: 50 INJECTION INTRAMUSCULAR; INTRAVENOUS at 14:00

## 2024-03-26 RX ADMIN — CEFAZOLIN SODIUM 2 G: 2 INJECTION, SOLUTION INTRAVENOUS at 11:22

## 2024-03-26 RX ADMIN — ROCURONIUM BROMIDE 50 MG: 10 INJECTION, SOLUTION INTRAVENOUS at 11:26

## 2024-03-26 RX ADMIN — SUGAMMADEX 200 MG: 100 INJECTION, SOLUTION INTRAVENOUS at 13:23

## 2024-03-26 RX ADMIN — Medication 100 MCG: at 11:56

## 2024-03-26 RX ADMIN — DEXAMETHASONE SODIUM PHOSPHATE 10 MG: 10 INJECTION, SOLUTION INTRAMUSCULAR; INTRAVENOUS at 11:11

## 2024-03-26 RX ADMIN — FENTANYL CITRATE 100 MCG: 50 INJECTION INTRAMUSCULAR; INTRAVENOUS at 11:05

## 2024-03-26 RX ADMIN — ACETAMINOPHEN 975 MG: 325 TABLET ORAL at 10:21

## 2024-03-26 RX ADMIN — GABAPENTIN 600 MG: 300 CAPSULE ORAL at 10:22

## 2024-03-26 RX ADMIN — LORAZEPAM 0.5 MG: 2 INJECTION INTRAMUSCULAR; INTRAVENOUS at 14:05

## 2024-03-26 RX ADMIN — LIDOCAINE HYDROCHLORIDE 5 ML: 10 INJECTION, SOLUTION EPIDURAL; INFILTRATION; INTRACAUDAL; PERINEURAL at 11:26

## 2024-03-26 RX ADMIN — MIDAZOLAM HYDROCHLORIDE 2 MG: 1 INJECTION, SOLUTION INTRAMUSCULAR; INTRAVENOUS at 11:05

## 2024-03-26 RX ADMIN — MIDAZOLAM 2 MG: 1 INJECTION INTRAMUSCULAR; INTRAVENOUS at 11:20

## 2024-03-26 RX ADMIN — HYDROMORPHONE HYDROCHLORIDE 0.5 MG: 1 INJECTION, SOLUTION INTRAMUSCULAR; INTRAVENOUS; SUBCUTANEOUS at 14:20

## 2024-03-26 RX ADMIN — ONDANSETRON 4 MG: 2 INJECTION, SOLUTION INTRAMUSCULAR; INTRAVENOUS at 11:31

## 2024-03-26 RX ADMIN — BUPIVACAINE HYDROCHLORIDE 20 ML: 2.5 INJECTION, SOLUTION INFILTRATION; PERINEURAL at 11:11

## 2024-03-26 RX ADMIN — PROPOFOL 500 MG: 10 INJECTION, EMULSION INTRAVENOUS at 11:30

## 2024-03-26 RX ADMIN — PROPOFOL 200 MG: 10 INJECTION, EMULSION INTRAVENOUS at 11:26

## 2024-03-26 SDOH — HEALTH STABILITY: MENTAL HEALTH: CURRENT SMOKER: 0

## 2024-03-26 ASSESSMENT — PAIN SCALES - GENERAL
PAINLEVEL_OUTOF10: 0 - NO PAIN
PAINLEVEL_OUTOF10: 4
PAINLEVEL_OUTOF10: 0 - NO PAIN
PAIN_LEVEL: 2
PAINLEVEL_OUTOF10: 2
PAINLEVEL_OUTOF10: 0 - NO PAIN
PAINLEVEL_OUTOF10: 10 - WORST POSSIBLE PAIN
PAINLEVEL_OUTOF10: 10 - WORST POSSIBLE PAIN

## 2024-03-26 ASSESSMENT — PAIN - FUNCTIONAL ASSESSMENT
PAIN_FUNCTIONAL_ASSESSMENT: 0-10
PAIN_FUNCTIONAL_ASSESSMENT: FLACC (FACE, LEGS, ACTIVITY, CRY, CONSOLABILITY)

## 2024-03-26 ASSESSMENT — COLUMBIA-SUICIDE SEVERITY RATING SCALE - C-SSRS
2. HAVE YOU ACTUALLY HAD ANY THOUGHTS OF KILLING YOURSELF?: NO
1. IN THE PAST MONTH, HAVE YOU WISHED YOU WERE DEAD OR WISHED YOU COULD GO TO SLEEP AND NOT WAKE UP?: NO

## 2024-03-26 NOTE — OP NOTE
Hip arthroscopy, labral repair, rim trim, osteoplasty, capsular plication ([place] bed) (L) Operative Note     Date: 3/26/2024  OR Location: [facility]    Name: Christine Bustos, : 1985, Age: 38 y.o., MRN: [id number], Sex: female    PREOPERATIVE DIAGNOSIS:   1. left hip mixed type femoral acetabular impingement.   2. Acetabular labral tear.   3. Intra-articular loose bodies, one of which required separate   cannula for its removal.   4. Mild hip instability noted on exam under anesthesia.   5. Chondral loss posterior inferior femoral head      POSTOPERATIVE DIAGNOSIS:   1. left hip mixed type femoral acetabular impingement.   2. Severely torn acetabular labrum with some moderate calcification.   3. Intra-articular loose bodies, one of which required separate   cannula for its removal.   4. Mild hip instability noted on exam under anesthesia.   5. Chondral loss posterior inferior femoral head, small area [id number] with good fibrocartilage fill  6. Chondral delamination upper 1/4th of acetabulum from the 1-3 o'clock position      OPERATION/PROCEDURE:   1. left hip arthroscopy with arthroscopic rim trim subspinous   decompression as a separate and identifiable procedure for pincer and   subspinous impingement.   2. Acetabular labral repair, 3-anchor looped configuration for a tear   extending from the 12 to 3 o'clock position.   3. Intra-articular loose body removal.   4. Femoral osteochondroplasty for CAM lesion.   5. Capsular plication.       SURGEON:   Filiberto Cruz MD.       ASSISTANT(S):   First assistant; Janel Polk PA-C.  Please note that we will be   billing for my physician's assistant as she was critical and   necessary for successful completion of this case including limb   positioning, anchor placement, suture management, and wound closure.       TRACTION TIME:   Less than 1 hour.       INDICATION FOR PROCEDURE:   Pleasant patient presented to my office with   persistent left-sided hip pain  that had failed conservative   management.  Advanced imaging had revealed a labral tear in the   setting of mixed impingement.  Risks and benefits of surgery have   been discussed with the patient including, but not limited to,   bleeding, infection, damage to nerves or blood vessels, need for   further procedures, risks of anesthesia, blood clots, progression of   osteoarthritis, incomplete pain relief, heterotopic ossification,   pudendal nerve palsy, lateral femoral cutaneous nerve palsy,   avascular necrosis requiring hip replacement.  The patient understood   these risks and wished to proceed with the operative intervention.       PROCEDURE AND FINDINGS:   The patient was identified in the preoperative holding area.   Operative extremity was marked with an indelible marker.  Informed   consent was reviewed.  Patient was taken to the operating room where a   time-out was performed verifying correct site, side, procedure, and   our special equipment.   They were placed supine on the operating room   table.  All bony prominences were well padded.  Patient was then prepped   and draped in usual sterile fashion after anesthesia induced was   without difficulty.  Once the patient was prepped and draped, the hip   was distracted via postless technique.  Standard anterolateral   arthroscopy portal was created.  A modified mid anterior portal   created.  A capsulotomy was performed.  Combination of a shaver and   radiofrequency device used to clean off the superior acetabular rim   identifying the patient's pincer and subspinous impingement, this was   taken down with a bur in the standard fashion as a separate and   identifiable procedure.  We then placed 3 anchors; 1 at 12, 1 at 1, 1   at 3 o'clock; these were shuttled around the labrum, tied down with   alternating half hitches in a loop configuration.  We noted excellent   fixation of labrum.  A few intra-articular loose bodies were removed   arthroscopically.  Head  was reduced.  We noted excellent resolution   of the patient's suction seal.  We identified and protected the   lateral retinacular vessels throughout the remainder of the case.  An   osteoplasty was then performed from the medial to lateral synovial   fold and proximally and distally to the extent of lesion.  Dynamic   examination revealed no residual impingement.  An x-ray showed good   sphericity on multiple views.  We then performed a capsular plication   of the interportal capsulotomy with multiple sutures, tying these   down with alternating half hitches and clipping them with the knot.   We drained the hip, withdrew the arthroscope, closed the portals with   interrupted sutures, applied a sterile bandage.  The patient was   awoken from anesthesia without complication, transported to PACU in   stable condition.  Postoperative course will be standard hip   arthroscopy protocol.           Filiberto Cruz MD     Attending Attestation: I was present and scrubbed for the entire procedure.    Filiberto Cruz  Phone Number: [phone]

## 2024-03-26 NOTE — ANESTHESIA POSTPROCEDURE EVALUATION
Patient: Christine Bustos    Procedure Summary       Date: 03/26/24 Room / Location: [facility] 08 / [facility]    Anesthesia Start: 1120 Anesthesia Stop: 1341    Procedure: Hip arthroscopy, labral repair, rim trim, osteoplasty, capsular plication ([place] bed) (Left: Hip) Diagnosis:       Tear of left acetabular labrum, initial encounter      Femoroacetabular impingement of left hip      Loose body in left hip      (Tear of left acetabular labrum, initial encounter [S73.192A])      (Femoroacetabular impingement of left hip [M25.852])      (Loose body in left hip [M24.052])    Surgeons: Filiberto Cruz MD Responsible Provider: Boaz Cowart MD    Anesthesia Type: general, regional ASA Status: 2            Anesthesia Type: general, regional    Vitals Value Taken Time   /88 03/26/24 1511   Temp 36.6 °C (97.9 °F) 03/26/24 1511   Pulse 88 03/26/24 1511   Resp 16 03/26/24 1511   SpO2 100 % 03/26/24 1445       Anesthesia Post Evaluation    Patient location during evaluation: bedside  Patient participation: complete - patient participated  Level of consciousness: awake and alert  Pain score: 2  Pain management: satisfactory to patient  Airway patency: patent  Cardiovascular status: hemodynamically stable  Respiratory status: acceptable  Hydration status: acceptable  Postoperative Nausea and Vomiting: none  Comments: PONV absent        No notable events documented.

## 2024-03-26 NOTE — DISCHARGE INSTRUCTIONS
Hip Arthroscopy Postoperative Instructions       Diet  Begin with clear liquids and light foods (jello, soup, etc)  Progress to your normal diet if you are not nauseated    Wound Care  Maintain your operative dressing, loosen bandage if swelling occurs  It is normal to have some bleeding or oozing from the surgical sites due to fluid irrigation during the surgery.  Please change the dressing as needed.  Removal surgical dressing on the third post-operative day. If minimal drainage is present, apply bandaids over incisions and change daily.  Do not apply bacitracin or other ointments to the incisions.  You can remove JAMES hose (long white socks) on the third post operative day  To avoid infection, keep incisions clean and dry.  You can shower 2 days post op.  MUST KEEP THE INCISIONS DRY.  NO immersion of the leg into a bath/pool etc.    Ice Therapy  Begin immediately after surgery  Use ice machine continuously or ice packs every 2 hours for 20 minutes daily.  Do not place the wrap or ice packs directly onto skin.     Activity  Elevate the operative leg to chest level whenever possible to decrease swelling  Do not place pillows under the knee, but rather place a pillow under the foot/ankle if needed  Use Crutches for ambulation.  Weight bearing will be determined by the procedure  Avoid long periods of sitting without the leg elevated or long distance travel for 2 weeks  No driving until discussed at your first post-operative appointment  May return to sedentary work or school once you have discontinued narcotic pain medication    Brace  Your brace should be worn at all times but can be removed for hygiene and physical therapy     Exercise  Do ankle pumps continuously throughout the day to reduce the possibility of a blood clot in your calf  Formal physical therapy will begin post-operative day #1      Medications  Pain medication is injected in the wound and hip during surgery.  This will wear off within 8-12 hours.    You may have received a nerve block prior to surgery. If so, this will wear off within 24-36 hours.  Most patients require narcotic pain medication for a short period of time after surgery.  Common side effects include nausea, drowsiness and constipation.  To decrease side effects take these medications with food. If constipation occurs, you can utilize over the counter Colace or Miralax.  If you are having problems with nausea and vomiting, contact the office to discuss.  Do not drive a car or operate machinery while taking narcotic pain medication.  The following medications are enclosed to use post-operatively  Percocet (Oxycodone with Acetaminophen) for pain control  Indocin (Indomethacin) for heterotopic bone prophylaxis.  **MAKE SURE THIS MEDICATION IS FILLED AND TAKEN AS DIRECTED**  Baby aspirin twice a day to help reduce the risk of a blood clot  Zofran (Ondansetron) as needed for nausea  Robaxin (Methocarbamol) as needed for spasms should they occur  Naproxen to take as needed for pain after you complete the Indocin    Contact information  Our office phone is [phone].  Contact the office with any of the following  Painful swelling or numbness  Unrelenting pain  Fever over 101° or chills  Redness around incision  Continuous drainage or bleeding from the incision. A small amount is to be expected)  Color change in the leg  Trouble breathing  Excessive nausea or vomiting  If you have an emergency after hours on the weekend, please call [phone] to reach the answering service who will contact Dr. Cruz  If you have an emergency that requires immediate attention, proceed to the nearest emergency room or call [phone].    Follow up  Your first post operative appointment should be scheduled around 14 days after surgery. Contact the office at [phone] if this has not yet been set up.

## 2024-03-26 NOTE — ANESTHESIA PREPROCEDURE EVALUATION
Patient: Christine Bustos    Procedure Information       Date/Time: 03/26/24 1100    Procedure: Hip arthroscopy, labral repair, rim trim, osteoplasty, capsular plication ([place] bed) (Left: Hip)    Location: [facility] / [facility]    Surgeons: Filiberto Cruz MD            Relevant Problems   Cardiovascular   (+) PFO (patent foramen ovale)      /Renal   (+) Benign liver cyst      Pulmonary   (+) Mild intermittent asthma with acute exacerbation      Other   (+) Axillary lymphadenopathy     Past Medical History:   Diagnosis Date    Acute renal failure (CMS/HCC)     as a child    Asthma     exercise or infection induced    Delayed emergence from general anesthesia     The last time I was put under anesthesia was in 2017 or 2018 and they always tell me to not go back to sleep and constantly come over to me because I try going back to sleep    Endometriosis     Fissure, anal 2006    PFO (patent foramen ovale)     last checked 2023 checked- ok       Past Surgical History:   Procedure Laterality Date    ADENOIDECTOMY      OTHER SURGICAL HISTORY  01/20/2016    wisdom teeth    OTHER SURGICAL HISTORY  01/20/2016    Laparoscopic Excision Of Ectopic Preg & bilateral tubes    OTHER SURGICAL HISTORY  11/03/2017    Laparos Large Intest Laser Vaporization Endometriotic Tissue    OTHER SURGICAL HISTORY      excision  of endometrial tissue    OTHER SURGICAL HISTORY      right axilla lymph node biopsy - neg    TONSILLECTOMY      TUBAL LIGATION       Encounter Date: 03/19/24   ECG 12 Lead   Result Value    Ventricular Rate 62    Atrial Rate 62    FL Interval 186    QRS Duration 76    QT Interval 396    QTC Calculation(Bazett) 401    P Axis 70    R Axis 77    T Axis 71    QRS Count 10    Q Onset 222    P Onset 129    P Offset 181    T Offset 420    QTC Fredericia 400    Narrative    Normal sinus rhythm with sinus arrhythmia  Normal ECG  No previous ECGs available  Confirmed by Brandon Artis ([id number]) on 3/22/2024 5:29:53 PM         Clinical information reviewed:                   NPO Detail:  No data recorded     Physical Exam    Airway  Mallampati: III  TM distance: >3 FB  Neck ROM: full     Cardiovascular   Comments: deferred   Dental    Pulmonary   Comments: deferred   Abdominal     Comments: deferred           Anesthesia Plan    History of general anesthesia?: yes  History of complications of general anesthesia?: yes    ASA 2     general and regional   (PENG block plust GA.  Pt reports slow to emerge in the past.  Possibly will utilize a more IV approach.)  The patient is not a current smoker.  Education provided regarding risk of obstructive sleep apnea.  intravenous induction   Postoperative administration of opioids is intended.  Anesthetic plan and risks discussed with patient.    Plan discussed with CRNA.

## 2024-03-26 NOTE — ANESTHESIA PROCEDURE NOTES
Peripheral Block    Patient location during procedure: pre-op  Start time: 3/26/2024 11:11 AM  End time: 3/26/2024 11:13 AM  Reason for block: at surgeon's request and post-op pain management  Staffing  Performed: attending   Authorized by: Boaz Cowart MD    Performed by: Boaz Cowart MD  Preanesthetic Checklist  Completed: patient identified, IV checked, site marked, risks and benefits discussed, surgical consent, monitors and equipment checked, pre-op evaluation and timeout performed   Timeout performed at: 3/26/2024 11:04 AM  Peripheral Block  Patient position: laying flat  Prep: alcohol swabs  Patient monitoring: heart rate and cardiac monitor  Block type: other (PENG)  Laterality: left  Injection technique: single-shot  Guidance: ultrasound guided  Needle  Needle type: short-bevel   Needle gauge: 21 G  Needle length: 10 cm  Needle localization: anatomical landmarks and ultrasound guidance  Catheter at skin depth: 8 cm  Assessment  Injection assessment: negative aspiration for heme and no paresthesia on injection  Paresthesia pain: none  Heart rate change: no  Slow fractionated injection: yes  Additional Notes  Pericapsular Nerve Group Block, Bupivacaine 0.25% with epi x 20mL.

## 2024-03-26 NOTE — ANESTHESIA PROCEDURE NOTES
Airway  Date/Time: 3/26/2024 11:29 AM  Urgency: elective    Airway not difficult    Staffing  Performed: CRNA   Authorized by: Boaz Cowart MD    Performed by: HOLLY Banegas-CRNA  Patient location during procedure: OR    Indications and Patient Condition  Indications for airway management: anesthesia  Spontaneous ventilation: present  Sedation level: deep  Preoxygenated: yes  Patient position: sniffing  Mask difficulty assessment: 1 - vent by mask    Final Airway Details  Final airway type: endotracheal airway      Successful airway: ETT  Cuffed: yes   Successful intubation technique: direct laryngoscopy  Facilitating devices/methods: intubating stylet  Endotracheal tube insertion site: oral  Blade: Heather  Blade size: #3  ETT size (mm): 7.0  Cormack-Lehane Classification: grade I - full view of glottis  Placement verified by: chest auscultation and capnometry   Cuff volume (mL): 7  Measured from: lips  ETT to lips (cm): 21  Number of attempts at approach: 1

## 2024-03-27 ENCOUNTER — EVALUATION (OUTPATIENT)
Dept: PHYSICAL THERAPY | Facility: CLINIC | Age: 39
End: 2024-03-27
Payer: COMMERCIAL

## 2024-03-27 ENCOUNTER — TELEPHONE (OUTPATIENT)
Dept: PHYSICAL THERAPY | Facility: CLINIC | Age: 39
End: 2024-03-27

## 2024-03-27 DIAGNOSIS — S73.192A TEAR OF LEFT ACETABULAR LABRUM, INITIAL ENCOUNTER: ICD-10-CM

## 2024-03-27 DIAGNOSIS — M24.052 LOOSE BODY IN LEFT HIP: ICD-10-CM

## 2024-03-27 DIAGNOSIS — M25.852 FEMOROACETABULAR IMPINGEMENT OF LEFT HIP: ICD-10-CM

## 2024-03-27 DIAGNOSIS — M25.552 LEFT HIP PAIN: Primary | ICD-10-CM

## 2024-03-27 PROCEDURE — 97110 THERAPEUTIC EXERCISES: CPT | Mod: GP

## 2024-03-27 PROCEDURE — 97535 SELF CARE MNGMENT TRAINING: CPT | Mod: GP

## 2024-03-27 PROCEDURE — 97162 PT EVAL MOD COMPLEX 30 MIN: CPT | Mod: GP

## 2024-03-27 NOTE — PROGRESS NOTES
Physical Therapy Evaluation    Patient Name: Christine Bustos  MRN: [id number]  Today's Date: 3/27/2024  Visit: 1/20  Referred by: Filiberto Cruz MD  Diagnosis:   1. Left hip pain  Follow Up In Physical Therapy      2. Femoroacetabular impingement of left hip  Referral to Physical Therapy    Follow Up In Physical Therapy      3. Loose body in left hip  Follow Up In Physical Therapy      4. Tear of left acetabular labrum, initial encounter  Referral to Physical Therapy    Follow Up In Physical Therapy         S/P on 3/26/2024 by Dr. Filiberto Curz :  1. left hip arthroscopy with arthroscopic rim trim subspinous   decompression as a separate and identifiable procedure for pincer and   subspinous impingement.   2. Acetabular labral repair, 3-anchor looped configuration for a tear   extending from the 12 to 3 o'clock position.   3. Intra-articular loose body removal.   4. Femoral osteochondroplasty for CAM lesion.   5. Capsular plication.    PRECAUTIONS:   Increased falls risk  Foot flat WB L LE (pt father reports 50% WB),  hip doesn't pass nuetral - no hip ext  Wear hip brace at all times when not in therapy, set to 90 degrees of hip flexion  No hip extension past neutral for first 3 weeks  No sitting longer than 45 min  Gentle  There ex    SUBJECTIVE:   Pt reports she had abdominal pain. After seing OB/GYN and not being able to find the cause she was referred to ortho and discovered issues in her hip.Pt present with pants on over brace. Pt reports dressing is difficult and has not been able to put undergarments on.    Pain:  Pt reports Block hasn't worn off.  0/10  in  supine  at rest with pain med. Laying supine and cold  helps  2/10 at worst, movement.   Denies NT  Home Living: lives byself but parents are staying with her a few days.     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  Independent with ambulation and ADLs.     Leisure:  high school cheerleading starts in July,  lifting  and working out and   Take care of her animals: dog and cat       OBJECTIVE:  Pt present sitting in chair , reclined and not sitting in a 90/90 position.  Gait:Ambulate with B crutches NWB left slowly and unsteady requiring closer supervision.  Palpation: unable to palpate  Pt present with L hip brace set to limit hip flexion to 90 degrees and hip extension in neutral  Pt presents with sleeve to pneumatic device on however reports it is not working properly.  Bandage in place; clean and dry, no drainage.   Pt is very guarded and hesitant to move Left leg    Hip AROM: (degrees) Left Right   Flexion 10 WNL   Extension Lacking 10 degrees WNL   Abduction 0 WNL   External Rotation 0 WNL   Internal Rotation  WNL     Hip PROM: (degrees) Left Right   Flexion 20    Extension NT    Abduction 5    External Rotation     Internal Rotation       MMT NT due to post surgical status  *denotes pain with motion or muscle testing    ASSESSMENT:   Patient is a  38 year old female who presents to therapy on this date for evaluation of the left hip s/p hip arthroscopy on 3.26.2024 by Dr. Cruz. Examination on this date reveals signs and symptoms consistent  with  left hip arthroscopy with arthroscopic rim trim subspinous   Decompression, acetabular labral repair, Intra-articular loose body removal, Femoral osteochondroplasty for CAM lesion and  Capsular plication .  Patient with decreased motion, weakness, pain at end range and decreased wt bearing status and unsteady gait with crutches. Pt was very guarded and notes she does not do well with surgery.  These deficits are leading to difficulties with ADLs as well as recreational activity. Evaluation is of moderate complexity and clinical presentation is evolving due to post-op status; personal factors/co-morbidities effecting outcomes include:  complexity of surgery, lives alone, asthma ;  participation restrictions/ activity limitations/ body structures involve include: left  hip, unable to return to work, walk FBW, unable to  cheer leading, unable to lift weights, unable to dress, bath, and  perform ADLs independently and  unable to drive.   Skilled physical therapy will address these deficits to help reduce pain for return to prior level of function.  Pt tolerated session and required prompts for proper performance of skilled PT exercises and posture.     TREATMENT:  - Therex:  Supine   -Glut set 5 sec x10  Quad set 5 sec x10  Ankle pumps x20  -hip add isometric with pillow legs in neutral 5 sec x10  -hip abd isometric  in neutral 5 sec x10  -Transverse abdominal 10 sec x10 in supine      Hep  progress to 3x10 painfree and gentle   -Glut set 5 sec x10  Quad set 5 sec x10  Ankle pumps x20  -hip add isometric with pillow legs in neutral 5 sec x10  -hip abd isometric  in neutral 5 sec x10  -Transverse abdominal 10 sec x10 in supine    - Manual Therapy:  Gentle hip PROM  to approx flexion, hip ER 10 degrees, hip abd 10 degrees Pt very garded. Painfree range of motion        - Gait Train:  instruct pt in FFWB on left with ambulation upon entering and exiting therpay. Pt hesitant to put any weight on foot.       Problem solve problem with ice machine sleeve. Drain water from sleeve.    PATIENT EDUCATION:   Patient education on new HEP with demonstration and practice. Handout issued. Pt reports understanding.  Patient education on diagnosis, plan of care, therapy progression, pain and swelling self management using ice and ice machine,  Pt education on donning/doffing hip brace and sleeve for ice machine.  Pt education on log roll for supine-sit transfers  Pt education on WB status  Pt education on functional limitation and precautions and protocol.    Outpatient Education  Individual(s) Educated: Patient, Parent (father)  Education Provided: Body Mechanics, Home Exercise Program, Home Safety, Post-Op Precautions, POC, Signs/Symptoms of Infection (s/s DVT)  Diagnosis and Precautions:   (review donning/doffing hip brace and pnuematic device sleeve)  Risk and Benefits Discussed with Patient/Caregiver/Other: yes  Patient/Caregiver Demonstrated Understanding: yes  Plan of Care Discussed and Agreed Upon: yes  Patient Response to Education: Patient/Caregiver Verbalized Understanding of Information (father and pt)    PLAN:   Skilled physical therapy to improve left hip range of motion, joint mobility , strength, and stability to return patient to prior level of function.  Progress there ex and HEP as tolerated to increase strength and range of motion Per protocol. See chart.  Next visit consider gait train, PROM and progress there ex per protocol, and add prone over pillow to facilite hip extension to neutral.     Treatment/Interventions: Cryotherapy, Education/ Instruction, Electrical stimulation, Gait training, Hot pack, Manual therapy, Neuromuscular re-education, Self care/ home management, Taping techniques, Therapeutic activities, Therapeutic exercises, Vasopneumatic device  PT Plan: Skilled PT  PT Frequency: 2 times per week  Onset Date: 03/26/24  Number of Treatments Authorized: MN  Rehab Potential: Good  Plan of Care Agreement: Patient  Rehab potential: good  Plan of care agreement: yes    GOALS:  Active       PT Problem       Pt will ambulate with crutches FFWB without supervision 150 feet.       Start:  03/27/24    Expected End:  06/25/24            Pt will demonstrate Left hip AROM equal to right hip AROM in order to perfrom ADLs and self care.       Start:  03/27/24    Expected End:  06/25/24            Pt will be independent with HEP in order to return to work.       Start:  03/27/24    Expected End:  06/25/24            Pt will demonstrate 4+/5 left hip strength to improve ambulation up and down stairs.       Start:  03/27/24    Expected End:  06/25/24            Pt will reports 2/10 pain level at its highest after activiy       Start:  03/27/24    Expected End:  06/25/24            Pt will  dress and bath independently       Start:  03/27/24    Expected End:  06/25/24

## 2024-03-27 NOTE — PROGRESS NOTES
Called patient at approximately 4:00 and 4:10  First call to discuss with pt to wear legging and underwear to next visit. Discuss how to adjust brace when going to the bathroom with clothing.    Second call was to to give pt the number to [facility] [phone] to call directly about problems she is having with her ice machine.  Pt reported understanding.

## 2024-04-01 ENCOUNTER — TREATMENT (OUTPATIENT)
Dept: PHYSICAL THERAPY | Facility: CLINIC | Age: 39
End: 2024-04-01
Payer: COMMERCIAL

## 2024-04-01 DIAGNOSIS — M25.552 LEFT HIP PAIN: ICD-10-CM

## 2024-04-01 DIAGNOSIS — M25.852 FEMOROACETABULAR IMPINGEMENT OF LEFT HIP: ICD-10-CM

## 2024-04-01 DIAGNOSIS — S73.192A TEAR OF LEFT ACETABULAR LABRUM, INITIAL ENCOUNTER: ICD-10-CM

## 2024-04-01 DIAGNOSIS — M24.052 LOOSE BODY IN LEFT HIP: ICD-10-CM

## 2024-04-01 PROCEDURE — 97140 MANUAL THERAPY 1/> REGIONS: CPT | Mod: GP | Performed by: PHYSICAL THERAPIST

## 2024-04-01 PROCEDURE — 97110 THERAPEUTIC EXERCISES: CPT | Mod: GP | Performed by: PHYSICAL THERAPIST

## 2024-04-01 NOTE — PROGRESS NOTES
Physical Therapy Treatment    Patient Name: Christine Bustos  MRN: [id number]  Today's Date: 4/1/2024  Visit: 2/30     Time Calculation  Start Time: 1500  Stop Time: 1600  Time Calculation (min): 60 min  Diagnosis:   1. Left hip pain  Follow Up In Physical Therapy      2. Femoroacetabular impingement of left hip  Follow Up In Physical Therapy      3. Loose body in left hip  Follow Up In Physical Therapy      4. Tear of left acetabular labrum, initial encounter  Follow Up In Physical Therapy      PRECAUTIONS:  Left hip arthroscopy 3/26/24  50% WB with crutches and brace    SUBJECTIVE:  Patient reports compliance with HEP, icing, and CPM machine, c/o soreness, bruising and tightness in the left thigh.    Compliant with HEP? Y    PAIN:   3/10 left hip     OBJECTIVE:  Left hip PROM: flexion 80, abd 30, IR 20, ext to neutral    TREATMENT:  - Therex:   Fall out x 10  Prone glute sets 10x10 sec  Prone HS curl x10  Rock backs x 10  SAQ 2x15  Bridge 2x10  - Manual Therapy:  Circumduction, log rolling IR, prone knee flexion and IR stretch  - Gait Training:  Gait training for 50% WB walk to gait pattern  - Modalities:    CP x 10 min left hip    ASSESSMENT:  Patient tolerated exercises and manual well, guarded at first but was able to relax with time, good understanding of precautions but did have to modify gait pattern for foot flat 50% WB, will add new exercises to HEP.    EDUCATION:  Access Code: [email or web address]  URL: [email or web address]  Date: 04/01/2024  Prepared by: West Arambula    Exercises  - Supine Single Bent Knee Fallout  - 2 x daily - 7 x weekly - 3 sets - 10 reps  - Supine Bridge  - 2 x daily - 7 x weekly - 3 sets - 10 reps  - Prone Gluteal Sets  - 2 x daily - 7 x weekly - 3 sets - 10 reps  - Hip Hinge Rock Back  - 1 x daily - 7 x weekly - 3 sets - 10 reps  - Prone Knee Flexion  - 1 x daily - 7 x weekly - 3 sets - 10 reps  - Lying Prone  - 2 x daily - 7 x weekly - 1 reps - 30 min hold    PLAN:    New HEP twice daily, continue with CPM and ice routine, follow up in therapy in 3 days.

## 2024-04-04 ENCOUNTER — TREATMENT (OUTPATIENT)
Dept: PHYSICAL THERAPY | Facility: CLINIC | Age: 39
End: 2024-04-04
Payer: COMMERCIAL

## 2024-04-04 ENCOUNTER — APPOINTMENT (OUTPATIENT)
Dept: PHYSICAL THERAPY | Facility: CLINIC | Age: 39
End: 2024-04-04
Payer: COMMERCIAL

## 2024-04-04 DIAGNOSIS — M25.852 FEMOROACETABULAR IMPINGEMENT OF LEFT HIP: ICD-10-CM

## 2024-04-04 DIAGNOSIS — M24.052 LOOSE BODY IN LEFT HIP: ICD-10-CM

## 2024-04-04 DIAGNOSIS — M25.552 LEFT HIP PAIN: Primary | ICD-10-CM

## 2024-04-04 DIAGNOSIS — S73.192A TEAR OF LEFT ACETABULAR LABRUM, INITIAL ENCOUNTER: ICD-10-CM

## 2024-04-04 PROCEDURE — 97140 MANUAL THERAPY 1/> REGIONS: CPT | Mod: GP

## 2024-04-04 PROCEDURE — 97110 THERAPEUTIC EXERCISES: CPT | Mod: GP

## 2024-04-04 ASSESSMENT — PAIN - FUNCTIONAL ASSESSMENT: PAIN_FUNCTIONAL_ASSESSMENT: 0-10

## 2024-04-04 ASSESSMENT — PAIN SCALES - GENERAL: PAINLEVEL_OUTOF10: 3

## 2024-04-04 NOTE — PROGRESS NOTES
"Physical Therapy    Physical Therapy Treatment    Patient Name: Christine Bustos  MRN: [id number]  Today's Date: 4/4/2024  Visit: 3/30     Time Calculation  Start Time: 1400  Stop Time: 1500  Time Calculation (min): 60 min    Diagnosis:   1. Left hip pain        2. Femoroacetabular impingement of left hip        3. Loose body in left hip        4. Tear of left acetabular labrum, initial encounter          PRECAUTIONS:  Left hip arthroscopy 3/26/24  50% WB with crutches and brace    SUBJECTIVE:  The patient reports she has been compliant with HEP.      PAIN:   3/10 left hip     OBJECTIVE:  Left hip PROM: flexion 80    TREATMENT:   - Therex: 25 minutes  Bridges - 3x10  SAQ - 3x10  Supine Iso Hip Add with Ball - 2x10 5\"H  Supine Iso Hip Abduction with Belt - 2x10 5\"H  Supine PPT - x10 5\"H  Prone knee flexion - 2x10     -Manual PROM: 25 minutes  Supine HS stretch  Hip Flexion  Hip Abduction  Hip IR  Prone Quad stretch    - Modalities:    CP x 10 min left hip    ASSESSMENT:  The patient reports experiencing 0/10 L hip pain following treatment.  Will continue PT at [place] next week.    PLAN:   New HEP twice daily, continue with CPM and ice routine, follow up in therapy in [place] next week       "

## 2024-04-05 ENCOUNTER — APPOINTMENT (OUTPATIENT)
Dept: PHYSICAL THERAPY | Facility: CLINIC | Age: 39
End: 2024-04-05
Payer: COMMERCIAL

## 2024-04-08 ENCOUNTER — APPOINTMENT (OUTPATIENT)
Dept: PHYSICAL THERAPY | Facility: CLINIC | Age: 39
End: 2024-04-08
Payer: COMMERCIAL

## 2024-04-09 ENCOUNTER — APPOINTMENT (OUTPATIENT)
Dept: PHYSICAL THERAPY | Facility: CLINIC | Age: 39
End: 2024-04-09
Payer: COMMERCIAL

## 2024-04-10 ENCOUNTER — OFFICE VISIT (OUTPATIENT)
Dept: ORTHOPEDIC SURGERY | Facility: HOSPITAL | Age: 39
End: 2024-04-10
Payer: COMMERCIAL

## 2024-04-10 DIAGNOSIS — S73.192D TEAR OF LEFT ACETABULAR LABRUM, SUBSEQUENT ENCOUNTER: Primary | ICD-10-CM

## 2024-04-10 DIAGNOSIS — M25.852 FEMOROACETABULAR IMPINGEMENT OF LEFT HIP: ICD-10-CM

## 2024-04-10 PROCEDURE — 99024 POSTOP FOLLOW-UP VISIT: CPT | Performed by: SPECIALIST/TECHNOLOGIST

## 2024-04-10 PROCEDURE — 1036F TOBACCO NON-USER: CPT | Performed by: SPECIALIST/TECHNOLOGIST

## 2024-04-10 NOTE — PROGRESS NOTES
The patient returns status post left hip arthroscopy on 3/26/2024.  They are doing well.  She denies any problems or issues.  She takes a half a tablet of her pain medication post physical therapy.  They have no evidence of lower extremity DVT.  Incisions are clean and dry.  Healing well.  Sutures were removed today.  Steri-Strips applied.  It is okay to get the incisions wet.  Avoid submerging in a hot tub, bathtub, swimming pool or directly under the shower.  Their pain is appropriate.  Range of motion is within normal limits.    Continue therapy per protocol at [facility].  We emphasized the importance of gaining her range of motion and soft tissue mobility similar to her nonoperative hip over the next 4 weeks.  She should not begin to perform any strengthening exercises until the 6-week postop visit.  She asked when it is safe for her to drive and since this is her left leg it is okay to. I encouraged her to switch to extra strength Tylenol if she needs anything for pain.  Goal is to get back to symmetric and pain-free range of motion to her contralateral side.  We will see her back at the 6-week visit.  All of her questions were answered.  She is in agreement this plan.

## 2024-04-11 ENCOUNTER — TREATMENT (OUTPATIENT)
Dept: PHYSICAL THERAPY | Facility: CLINIC | Age: 39
End: 2024-04-11
Payer: COMMERCIAL

## 2024-04-11 DIAGNOSIS — S73.192A TEAR OF LEFT ACETABULAR LABRUM, INITIAL ENCOUNTER: ICD-10-CM

## 2024-04-11 DIAGNOSIS — M25.852 FEMOROACETABULAR IMPINGEMENT OF LEFT HIP: ICD-10-CM

## 2024-04-11 DIAGNOSIS — M24.052 LOOSE BODY IN LEFT HIP: ICD-10-CM

## 2024-04-11 DIAGNOSIS — M25.552 LEFT HIP PAIN: Primary | ICD-10-CM

## 2024-04-11 PROCEDURE — 97140 MANUAL THERAPY 1/> REGIONS: CPT | Mod: GP | Performed by: PHYSICAL THERAPIST

## 2024-04-11 PROCEDURE — 97110 THERAPEUTIC EXERCISES: CPT | Mod: GP | Performed by: PHYSICAL THERAPIST

## 2024-04-11 NOTE — PROGRESS NOTES
Physical Therapy Treatment    Patient Name: Christine Bustos  MRN: [id number]  Today's Date: 4/11/2024  Visit: 4/30    Diagnosis:   1. Left hip pain  Follow Up In Physical Therapy      2. Femoroacetabular impingement of left hip  Follow Up In Physical Therapy      3. Loose body in left hip  Follow Up In Physical Therapy      4. Tear of left acetabular labrum, initial encounter  Follow Up In Physical Therapy      PRECAUTIONS:  Left hip arthroscopy 3/26/24  WBAT with crutches    SUBJECTIVE:  Patient reports she followed up with ortho yesterday, DC brace and told her she can come off the crutches as tolerated, exercises going well, soreness controlled but hip feels stiff.  Compliant with HEP? Y  PAIN:   3-4/10 left hip     OBJECTIVE:  Left hip PROM: flexion 95, abd 45, IR 30, ER 20, ext 0    TREATMENT:  - Therex:   Rec bike x 5 min  Rock backs x 15  Prone HS curl x10  Prone active IR/ER X 10  Fall out x 15  SAQ x15  Bridge 2x15  - Manual Therapy:  Circumduction, hip PROM in all directions, STM  - Gait Training:  WBAT with crutches, emphasize hip ext  - Modalities:    CP x 10 min left hip    ASSESSMENT:  Patient with tightness in left hip, discussed importance of consistent stretching, will try to get on bike at home daily, discussed continuing with crutches until gait normalizes, will add  all new exercises to HEP.    PLAN:   New HEP twice daily, add bike, work on hip extension with gait.

## 2024-04-15 ENCOUNTER — TREATMENT (OUTPATIENT)
Dept: PHYSICAL THERAPY | Facility: CLINIC | Age: 39
End: 2024-04-15
Payer: COMMERCIAL

## 2024-04-15 DIAGNOSIS — M25.852 FEMOROACETABULAR IMPINGEMENT OF LEFT HIP: ICD-10-CM

## 2024-04-15 DIAGNOSIS — S73.192A TEAR OF LEFT ACETABULAR LABRUM, INITIAL ENCOUNTER: ICD-10-CM

## 2024-04-15 DIAGNOSIS — M24.052 LOOSE BODY IN LEFT HIP: ICD-10-CM

## 2024-04-15 DIAGNOSIS — M25.552 LEFT HIP PAIN: ICD-10-CM

## 2024-04-15 PROCEDURE — 97110 THERAPEUTIC EXERCISES: CPT | Mod: GP | Performed by: PHYSICAL THERAPIST

## 2024-04-15 PROCEDURE — 97140 MANUAL THERAPY 1/> REGIONS: CPT | Mod: GP | Performed by: PHYSICAL THERAPIST

## 2024-04-15 NOTE — PROGRESS NOTES
Physical Therapy Treatment    Patient Name: Christine Bustos  MRN: [id number]  Today's Date: 4/15/2024  Visit: 5/30    Diagnosis:   1. Left hip pain  Follow Up In Physical Therapy      2. Femoroacetabular impingement of left hip  Follow Up In Physical Therapy      3. Loose body in left hip  Follow Up In Physical Therapy      4. Tear of left acetabular labrum, initial encounter  Follow Up In Physical Therapy      PRECAUTIONS:  Left hip arthroscopy 3/26/24  WBAT with crutches    SUBJECTIVE:  Patient reports she's been going to gym daily and riding bike with HEP.  Compliant with HEP? Y  PAIN:   2/10 left hip     OBJECTIVE:  Left hip PROM: flexion 95, abd 45, IR 30, ER 30, ext 0    TREATMENT:  - Therex:   Upright bike (seat 3) x 5 min  Kneeling hip flexor stretch 5x30  Fall out x 10  Rock backs x 15  Prone TKE x 15  Prone hip ext x 15  Prone active IR/ER X 10  LAQ x 20  Bridge 2x15  BLTB supine clam shell  - Manual Therapy:  Circumduction, hip PROM in all directions, STM  - Gait Training:  WBAT with crutches, emphasize hip ext    ASSESSMENT:  Progressing better now, less tightness but still with some tightness, specifically hip flexors, good understanding of HEP and consistency with gym program.    EDUCATION:  Access Code: [email or web address]  URL: [email or web address]  Date: 04/15/2024  Prepared by: West Arambula    Exercises  - Half Kneeling Hip Flexor Stretch  - 2 x daily - 7 x weekly - 1 sets - 5 reps - 30 hold  - Supine Figure 4 Piriformis Stretch  - 2 x daily - 7 x weekly - 1 sets - 15 reps - 10 hold  - Prone Hip Extension  - 2 x daily - 7 x weekly - 2 sets - 10 reps    PLAN:   New HEP twice daily, add new stretches to routine, work on hip ext.

## 2024-04-17 ENCOUNTER — TREATMENT (OUTPATIENT)
Dept: PHYSICAL THERAPY | Facility: CLINIC | Age: 39
End: 2024-04-17
Payer: COMMERCIAL

## 2024-04-17 DIAGNOSIS — M24.052 LOOSE BODY IN LEFT HIP: ICD-10-CM

## 2024-04-17 DIAGNOSIS — M25.852 FEMOROACETABULAR IMPINGEMENT OF LEFT HIP: ICD-10-CM

## 2024-04-17 DIAGNOSIS — M25.552 LEFT HIP PAIN: ICD-10-CM

## 2024-04-17 DIAGNOSIS — S73.192A TEAR OF LEFT ACETABULAR LABRUM, INITIAL ENCOUNTER: ICD-10-CM

## 2024-04-17 PROCEDURE — 97140 MANUAL THERAPY 1/> REGIONS: CPT | Mod: GP | Performed by: PHYSICAL THERAPIST

## 2024-04-17 PROCEDURE — 97110 THERAPEUTIC EXERCISES: CPT | Mod: GP | Performed by: PHYSICAL THERAPIST

## 2024-04-17 NOTE — PROGRESS NOTES
Physical Therapy Treatment    Patient Name: Christine Bustos  MRN: [id number]  Today's Date: 4/17/2024  Visit: 6/30    Diagnosis:   1. Left hip pain  Follow Up In Physical Therapy      2. Femoroacetabular impingement of left hip  Follow Up In Physical Therapy      3. Loose body in left hip  Follow Up In Physical Therapy      4. Tear of left acetabular labrum, initial encounter  Follow Up In Physical Therapy      PRECAUTIONS:  Left hip arthroscopy 3/26/24  WBAT with crutches    SUBJECTIVE:  Patient reports main c/o anterior hip soreness/tightness, does feel improvement daily.  Compliant with HEP? Y  PAIN:   1-2/10 left hip     OBJECTIVE:  Left hip PROM: flexion 95, abd 50, IR 30, ER 30, ext 0    TREATMENT:  - Therex:   Upright bike (seat 3) x 6 min  Kneeling hip flexor stretch 5x30  Fall out x 10  Figure 4 stretch 10x10 sec  Rock backs x 10  Prone TKE x 20  Prone hip ext x 20  Prone active IR/ER X 15  LAQ x 20  Bridge 2x15  BLTB supine clam shell x 15  Clam shell x 15  TG squat x 20 L7+1  - Manual Therapy:  Circumduction, hip PROM in all directions, STM  - Gait Training:  Ambulation with 1 crutch    ASSESSMENT:  Continued anterior hip tightness but improving, rec move to 1 crutch, emphasize hip extension, tolerating new exercises well.    EDUCATION:    PLAN:   New HEP twice daily, add new stretches to routine, work on hip ext.

## 2024-04-22 ENCOUNTER — TREATMENT (OUTPATIENT)
Dept: PHYSICAL THERAPY | Facility: CLINIC | Age: 39
End: 2024-04-22
Payer: COMMERCIAL

## 2024-04-22 DIAGNOSIS — M25.552 LEFT HIP PAIN: ICD-10-CM

## 2024-04-22 DIAGNOSIS — M25.852 FEMOROACETABULAR IMPINGEMENT OF LEFT HIP: ICD-10-CM

## 2024-04-22 DIAGNOSIS — M24.052 LOOSE BODY IN LEFT HIP: ICD-10-CM

## 2024-04-22 DIAGNOSIS — S73.192A TEAR OF LEFT ACETABULAR LABRUM, INITIAL ENCOUNTER: ICD-10-CM

## 2024-04-22 PROCEDURE — 97140 MANUAL THERAPY 1/> REGIONS: CPT | Mod: GP | Performed by: PHYSICAL THERAPIST

## 2024-04-22 PROCEDURE — 97110 THERAPEUTIC EXERCISES: CPT | Mod: GP | Performed by: PHYSICAL THERAPIST

## 2024-04-22 NOTE — PROGRESS NOTES
Physical Therapy Treatment    Patient Name: Christine Bustos  MRN: [id number]  Today's Date: 4/22/2024  Visit: 7/30    Diagnosis:   1. Left hip pain  Follow Up In Physical Therapy      2. Femoroacetabular impingement of left hip  Follow Up In Physical Therapy      3. Loose body in left hip  Follow Up In Physical Therapy      4. Tear of left acetabular labrum, initial encounter  Follow Up In Physical Therapy      PRECAUTIONS:  Left hip arthroscopy 3/26/24  WBAT with 1 crutch    SUBJECTIVE:  Patient reports she's walking better, faster, exercises at gym going well, presents with 1 crutch.  Compliant with HEP? Y  PAIN:   1-2/10 left hip     OBJECTIVE:  Left hip PROM: flexion 95, abd 60, IR 45, ER 30, ext 5    TREATMENT:  - Therex:   Upright bike (seat 3) x 6 min  Kneeling hip flexor stretch 5x30  Fall out x 10  Figure 4 stretch 10x10 sec  Rock backs x 15  Prone TKE x 20  Prone hip ext x 20  Prone hip ER iso 10x10 sec  Prone hip IR iso 10x10 sec  Prone active IR/ER X 10  Clam shell 2x10  Bridge 2x15  LAQ 2x15 2#  Standing calf raise x 30  Standing hip abd 2x10 ea  TG squat x 20 L7+1  - Manual Therapy:  Circumduction, hip PROM in all directions, STM  - Gait Training:  Ambulation without crutch  Modalities:  CP x 10 min left hip    ASSESSMENT:  Nice progress today, best session to date, able to ambulate in clinic without crutch but still tentative, ROM and strength improving.    PLAN:   HEP twice daily, work on gradually getting off crutch.

## 2024-04-24 ENCOUNTER — TREATMENT (OUTPATIENT)
Dept: PHYSICAL THERAPY | Facility: CLINIC | Age: 39
End: 2024-04-24
Payer: COMMERCIAL

## 2024-04-24 DIAGNOSIS — S73.192A TEAR OF LEFT ACETABULAR LABRUM, INITIAL ENCOUNTER: ICD-10-CM

## 2024-04-24 DIAGNOSIS — M25.552 LEFT HIP PAIN: ICD-10-CM

## 2024-04-24 DIAGNOSIS — M25.852 FEMOROACETABULAR IMPINGEMENT OF LEFT HIP: ICD-10-CM

## 2024-04-24 DIAGNOSIS — M24.052 LOOSE BODY IN LEFT HIP: ICD-10-CM

## 2024-04-24 PROCEDURE — 97110 THERAPEUTIC EXERCISES: CPT | Mod: GP | Performed by: PHYSICAL THERAPIST

## 2024-04-24 PROCEDURE — 97140 MANUAL THERAPY 1/> REGIONS: CPT | Mod: GP | Performed by: PHYSICAL THERAPIST

## 2024-04-24 NOTE — PROGRESS NOTES
Physical Therapy Treatment    Patient Name: Christine Bustos  MRN: [id number]  Today's Date: 4/24/2024  Visit: 7/30    Diagnosis:   1. Left hip pain  Follow Up In Physical Therapy      2. Femoroacetabular impingement of left hip  Follow Up In Physical Therapy      3. Loose body in left hip  Follow Up In Physical Therapy      4. Tear of left acetabular labrum, initial encounter  Follow Up In Physical Therapy      PRECAUTIONS:  Left hip arthroscopy 3/26/24  WBAT with 1 crutch    SUBJECTIVE:  Patient reports continued improvement, no current pain but does notice some stiffness when getting out of car or after prolonged sitting.  Compliant with HEP? Y  PAIN:   Currently 0/10 left hip     OBJECTIVE:  Gait: using 1 crutch for longer ambulation, able to walk without crutch with good pattern at slow speeds    TREATMENT:  - Therex:   Upright bike (seat 3) x 10 min  Kneeling hip flexor stretch 5x30  Fall out x 10  Figure 4 stretch 10x10 sec  Rock backs x 15  Prone hip ER iso 10x10 sec  Prone hip IR iso 10x10 sec  Quadruped alt hip ext x 15 ea  Prone active IR/ER X 10  Mod plank 3x30 sec  Clam shell 2x15  Bridge 2x15  LAQ 2x15 3#  Standing calf raise x 30  Standing hip abd 2x10 ea  SLS 3x30 sec  TG squat x 20 L7+1  - Manual Therapy:  Circumduction, hip PROM in all directions, STM  - Gait Training:  Ambulation without crutch  Modalities:  CP x 10 min left hip    ASSESSMENT:  Improved hip ROM and soft tissue mobility around left hip, tolerating increased exercises well.    PLAN:   Continue with HEP, progress with protocol as tolerated.

## 2024-04-29 ENCOUNTER — TREATMENT (OUTPATIENT)
Dept: PHYSICAL THERAPY | Facility: CLINIC | Age: 39
End: 2024-04-29
Payer: COMMERCIAL

## 2024-04-29 DIAGNOSIS — S73.192A TEAR OF LEFT ACETABULAR LABRUM, INITIAL ENCOUNTER: ICD-10-CM

## 2024-04-29 DIAGNOSIS — M25.852 FEMOROACETABULAR IMPINGEMENT OF LEFT HIP: ICD-10-CM

## 2024-04-29 DIAGNOSIS — M24.052 LOOSE BODY IN LEFT HIP: ICD-10-CM

## 2024-04-29 DIAGNOSIS — M25.552 LEFT HIP PAIN: ICD-10-CM

## 2024-04-29 PROCEDURE — 97110 THERAPEUTIC EXERCISES: CPT | Mod: GP | Performed by: PHYSICAL THERAPIST

## 2024-04-29 PROCEDURE — 97140 MANUAL THERAPY 1/> REGIONS: CPT | Mod: GP | Performed by: PHYSICAL THERAPIST

## 2024-04-29 NOTE — PROGRESS NOTES
"  Physical Therapy Treatment    Patient Name: Christine Bustos  MRN: [id number]  Today's Date: 4/29/2024  Visit: 8/30    Diagnosis:   1. Left hip pain  Follow Up In Physical Therapy      2. Femoroacetabular impingement of left hip  Follow Up In Physical Therapy      3. Loose body in left hip  Follow Up In Physical Therapy      4. Tear of left acetabular labrum, initial encounter  Follow Up In Physical Therapy      PRECAUTIONS:  Left hip arthroscopy 3/26/24    SUBJECTIVE:  Patient reports she had food poisoning yesterday, unable to do exercises, hip feeling good.  Compliant with HEP? Y  PAIN:   Currently 0/10 left hip     OBJECTIVE:  Left hip prone active IR 50 degrees (right 55), ER 25 degrees (right 30)    TREATMENT:  - Therex:   Upright bike (seat 3) x 10 min  Kneeling hip flexor stretch 5x30  Fall out x 10  Figure 4 stretch 10x10 sec  Rock backs x 15  Prone hip ER iso 10x10 sec  Prone hip IR iso 10x10 sec  Quadruped alt hip ext x 15 ea  Prone active IR/ER X 10  Mod plank 3x30 sec  Clam shell 2x15  SL hip abd 2x10  Bridge 2x15  LAQ 2x15 4#  Standing calf raise x 30  Step ups x 15 6\"  SLS 3x30 sec  TG squat 3x15 L7+2  - Manual Therapy:  Circumduction, hip PROM in all directions, STM x 20 min  Modalities:  CP x 10 min left hip    ASSESSMENT:  Progressing very well now, still with some anterior hip tightness, tolerating increased strength exercises well, will phase of crutch.    PLAN:   Continue with HEP, progress with protocol as tolerated.        "

## 2024-05-01 ENCOUNTER — TREATMENT (OUTPATIENT)
Dept: PHYSICAL THERAPY | Facility: CLINIC | Age: 39
End: 2024-05-01
Payer: COMMERCIAL

## 2024-05-01 DIAGNOSIS — M24.052 LOOSE BODY IN LEFT HIP: ICD-10-CM

## 2024-05-01 DIAGNOSIS — S73.192A TEAR OF LEFT ACETABULAR LABRUM, INITIAL ENCOUNTER: ICD-10-CM

## 2024-05-01 DIAGNOSIS — M25.552 LEFT HIP PAIN: ICD-10-CM

## 2024-05-01 DIAGNOSIS — M25.852 FEMOROACETABULAR IMPINGEMENT OF LEFT HIP: ICD-10-CM

## 2024-05-01 PROCEDURE — 97140 MANUAL THERAPY 1/> REGIONS: CPT | Mod: GP | Performed by: PHYSICAL THERAPIST

## 2024-05-01 PROCEDURE — 97110 THERAPEUTIC EXERCISES: CPT | Mod: GP | Performed by: PHYSICAL THERAPIST

## 2024-05-01 NOTE — PROGRESS NOTES
"  Physical Therapy Treatment    Patient Name: Christine Bustos  MRN: [id number]  Today's Date: 5/1/2024  Visit: 9/30    Diagnosis:   1. Left hip pain  Follow Up In Physical Therapy      2. Femoroacetabular impingement of left hip  Follow Up In Physical Therapy      3. Loose body in left hip  Follow Up In Physical Therapy      4. Tear of left acetabular labrum, initial encounter  Follow Up In Physical Therapy      PRECAUTIONS:  Left hip arthroscopy 3/26/24    SUBJECTIVE:  Patient reports she did some vacuuming yesterday and had a lot of anterior hip soreness later, ice relieved soreness but a little stiff today.  Compliant with HEP? Y  PAIN:   Currently 0/10 left hip     OBJECTIVE:  Left hip PROM: ER 45 degrees, ext 5 degrees    TREATMENT:  - Therex:   Upright bike (seat 3) x 10 min  Kneeling hip flexor stretch 5x30  Fall out x 10  Figure 4 stretch 10x10 sec  Rock backs x 15  Prone hip ER iso 10x10 sec  Prone hip IR iso 10x10 sec  Quadruped alt hip ext 2x10 ea  Mod plank 3x30 sec  Clam shell 2x15  SL hip abd 2x15  Bridge 2x15  Standing calf raise x 30  Step ups 2x15 6\"  SLS 3x30 sec  TG squat 3x15 L7+2  - Manual Therapy:  Circumduction, hip PROM in all directions, STM x 20 min  Modalities:  CP x 10 min left hip    ASSESSMENT:  Patient continue to be tentative with gait but no longer using crutch consistently, PROM improving but continues with anterior tightness, challenged by strength exercises.    PLAN:   Continue with HEP, progress with protocol as tolerated.        "

## 2024-05-03 ENCOUNTER — APPOINTMENT (OUTPATIENT)
Dept: ORTHOPEDIC SURGERY | Facility: HOSPITAL | Age: 39
End: 2024-05-03
Payer: COMMERCIAL

## 2024-05-06 ENCOUNTER — TREATMENT (OUTPATIENT)
Dept: PHYSICAL THERAPY | Facility: CLINIC | Age: 39
End: 2024-05-06
Payer: COMMERCIAL

## 2024-05-06 DIAGNOSIS — M25.552 LEFT HIP PAIN: ICD-10-CM

## 2024-05-06 DIAGNOSIS — M25.852 FEMOROACETABULAR IMPINGEMENT OF LEFT HIP: ICD-10-CM

## 2024-05-06 DIAGNOSIS — S73.192A TEAR OF LEFT ACETABULAR LABRUM, INITIAL ENCOUNTER: ICD-10-CM

## 2024-05-06 DIAGNOSIS — M24.052 LOOSE BODY IN LEFT HIP: ICD-10-CM

## 2024-05-06 PROCEDURE — 97110 THERAPEUTIC EXERCISES: CPT | Mod: GP

## 2024-05-06 PROCEDURE — 97140 MANUAL THERAPY 1/> REGIONS: CPT | Mod: GP

## 2024-05-09 ENCOUNTER — TREATMENT (OUTPATIENT)
Dept: PHYSICAL THERAPY | Facility: CLINIC | Age: 39
End: 2024-05-09
Payer: COMMERCIAL

## 2024-05-09 DIAGNOSIS — M24.052 LOOSE BODY IN LEFT HIP: ICD-10-CM

## 2024-05-09 DIAGNOSIS — M25.552 LEFT HIP PAIN: ICD-10-CM

## 2024-05-09 DIAGNOSIS — M25.852 FEMOROACETABULAR IMPINGEMENT OF LEFT HIP: ICD-10-CM

## 2024-05-09 DIAGNOSIS — S73.192A TEAR OF LEFT ACETABULAR LABRUM, INITIAL ENCOUNTER: ICD-10-CM

## 2024-05-09 PROCEDURE — 97110 THERAPEUTIC EXERCISES: CPT | Mod: GP | Performed by: PHYSICAL THERAPIST

## 2024-05-09 PROCEDURE — 97140 MANUAL THERAPY 1/> REGIONS: CPT | Mod: GP | Performed by: PHYSICAL THERAPIST

## 2024-05-09 NOTE — LETTER
To Whom It May Concern:  I am currently working with Christine Bustos following her left hip surgery. At this time in her recovery, it is important that Christine not spend too much time in the seated position and would benefit from a standing desk as she return to work. Please feel free to call with any questions.    Sincerely,      [place], PT

## 2024-05-09 NOTE — PROGRESS NOTES
"  Physical Therapy Treatment    Patient Name: Christine Bustos  MRN: [id number]  Today's Date: 5/9/2024  Visit: 11/30      Diagnosis:   1. Left hip pain  Follow Up In Physical Therapy      2. Femoroacetabular impingement of left hip  Follow Up In Physical Therapy      3. Loose body in left hip  Follow Up In Physical Therapy      4. Tear of left acetabular labrum, initial encounter  Follow Up In Physical Therapy      PRECAUTIONS:  Left hip arthroscopy 3/26/24    SUBJECTIVE:  Pt reports she feels she has recovered from trip last weekend, less stiff and no soreness, main c/o is stiffness when first getting up from sitting.  PAIN:   Currently 0/10 left hip     OBJECTIVE:  Left hip ext 3+/5    TREATMENT:  - Therex:   Upright bike (seat 3) x 10 min L3  Kneeling hip flexor stretch 5x30  Fall out x 10  Figure 4 stretch 10x10 sec  Rock backs x 15  Prone hip ER iso 10x10 sec  Prone hip IR iso 10x10 sec  Quadruped alt hip ext 2x10 ea  Plank 3x30 sec  Clam shell 2x15  SL hip abd 2x15  Bridge 2x15  Step ups 2x15 6\"  Step down   SLS 10/10 sec sec  TG squat 3x15 L7+2  - Manual Therapy:  Circumduction, hip PROM in all directions, STM x 20 min  - Modalities:  CP x 10 min left hip    ASSESSMENT:  Continued anterior hip tightness limiting full hip extension and ER, tolerating exercises well but challenged by strengthening, less tentative with gait.      PLAN:   Continue with HEP, progress with protocol as tolerated.        "

## 2024-05-14 ENCOUNTER — TREATMENT (OUTPATIENT)
Dept: PHYSICAL THERAPY | Facility: CLINIC | Age: 39
End: 2024-05-14
Payer: COMMERCIAL

## 2024-05-14 DIAGNOSIS — M24.052 LOOSE BODY IN LEFT HIP: ICD-10-CM

## 2024-05-14 DIAGNOSIS — S73.192A TEAR OF LEFT ACETABULAR LABRUM, INITIAL ENCOUNTER: ICD-10-CM

## 2024-05-14 DIAGNOSIS — M25.852 FEMOROACETABULAR IMPINGEMENT OF LEFT HIP: Primary | ICD-10-CM

## 2024-05-14 DIAGNOSIS — M25.552 LEFT HIP PAIN: ICD-10-CM

## 2024-05-14 PROCEDURE — 97110 THERAPEUTIC EXERCISES: CPT | Mod: GP | Performed by: PHYSICAL THERAPIST

## 2024-05-14 PROCEDURE — 97140 MANUAL THERAPY 1/> REGIONS: CPT | Mod: GP | Performed by: PHYSICAL THERAPIST

## 2024-05-14 NOTE — PROGRESS NOTES
"  Physical Therapy Treatment    Patient Name: Christine Bustos  MRN: [id number]  Today's Date: 5/14/2024  Visit: 12/30      Diagnosis:   1. Femoroacetabular impingement of left hip        2. Left hip pain        PRECAUTIONS:  Left hip arthroscopy 3/26/24    SUBJECTIVE:  Pt reports no real pain but hip feels weird, some tightness when getting up from sitting, some nerve issues in left thigh, overall doing well.  Currently 0/10 left hip     OBJECTIVE:  Left hip ext 3+/5    TREATMENT:  - Therex:   Upright bike (seat 3) x 8 min L3  Kneeling hip flexor stretch 5x30  Fall out x 10  Figure 4 stretch 10x10 sec  Rock backs x 15  Prone hip ER iso 10x10 sec  Prone hip IR iso 10x10 sec  Quadruped alt hip ext 2x15 ea  Plank 3x30 sec  Clam shell 2x15  SL hip abd 2x15  SL bridge 2x10  Bridge 1x15  Step ups 2x15 6\"  Step down   SLS 10/10 sec sec  TG squat 3x15 L7+2  - Manual Therapy:  Circumduction, hip PROM in all directions, STM x 20 min  - Modalities:  CP x 10 min left hip    ASSESSMENT:  Strength improving slowly as expected, good hip ROM compared to other side but motion not fluid yet, still slightly tentative with gait.    PLAN:   Continue with HEP, progress with protocol as tolerated.        "

## 2024-05-17 ENCOUNTER — TREATMENT (OUTPATIENT)
Dept: PHYSICAL THERAPY | Facility: CLINIC | Age: 39
End: 2024-05-17
Payer: COMMERCIAL

## 2024-05-17 DIAGNOSIS — M25.552 LEFT HIP PAIN: ICD-10-CM

## 2024-05-17 DIAGNOSIS — S73.192A TEAR OF LEFT ACETABULAR LABRUM, INITIAL ENCOUNTER: ICD-10-CM

## 2024-05-17 DIAGNOSIS — M24.052 LOOSE BODY IN LEFT HIP: ICD-10-CM

## 2024-05-17 DIAGNOSIS — M25.852 FEMOROACETABULAR IMPINGEMENT OF LEFT HIP: ICD-10-CM

## 2024-05-17 PROCEDURE — 97110 THERAPEUTIC EXERCISES: CPT | Mod: GP | Performed by: PHYSICAL THERAPIST

## 2024-05-17 PROCEDURE — 97140 MANUAL THERAPY 1/> REGIONS: CPT | Mod: GP | Performed by: PHYSICAL THERAPIST

## 2024-05-17 NOTE — PROGRESS NOTES
"  Physical Therapy Treatment    Patient Name: Christine Bustos  MRN: [id number]  Today's Date: 5/17/2024  Visit: 13/30      Diagnosis:   1. Left hip pain  Follow Up In Physical Therapy      2. Femoroacetabular impingement of left hip  Follow Up In Physical Therapy      3. Loose body in left hip  Follow Up In Physical Therapy      4. Tear of left acetabular labrum, initial encounter  Follow Up In Physical Therapy      PRECAUTIONS:  Left hip arthroscopy 3/26/24    SUBJECTIVE:  Pt presents with no new complaints, exercises going well, some continued stiffness.  Currently 0/10 left hip     OBJECTIVE:  Prone left  hip AROM: IR 45, E 15 degrees    TREATMENT:  - Therex:   Upright bike (seat 3) x 8 min L3  Kneeling hip flexor stretch 5x30  Standing quad/HF stretch  Rock backs x 10  Bird dogs 2x10 ea  Prone hip ER iso 10x10 sec  Prone hip IR iso 10x10 sec  Clam shell 2x15  SL hip abd 2x15  Fall out x 10  Figure 4 stretch 10x10 sec  SL bridge 2x10  Bridge 1x15  TG squat 3x15 L7+2.5  Total hip abd 3x10 L3  Step ups 2x15 6\"  Step down 2x10 4\"  SLS 10/10 sec sec  - Manual Therapy:  Circumduction, hip PROM in all directions, STM x 20 min  - Modalities:  CP x 10 min left hip    ASSESSMENT:  Good tolerance to new exercises, some continued anterior hip tightness but improving.    PLAN:   Continue with HEP, progress with protocol as tolerated.        "

## 2024-05-21 ENCOUNTER — TREATMENT (OUTPATIENT)
Dept: PHYSICAL THERAPY | Facility: CLINIC | Age: 39
End: 2024-05-21
Payer: COMMERCIAL

## 2024-05-21 DIAGNOSIS — S73.192A TEAR OF LEFT ACETABULAR LABRUM, INITIAL ENCOUNTER: ICD-10-CM

## 2024-05-21 DIAGNOSIS — M25.852 FEMOROACETABULAR IMPINGEMENT OF LEFT HIP: ICD-10-CM

## 2024-05-21 DIAGNOSIS — M25.552 LEFT HIP PAIN: Primary | ICD-10-CM

## 2024-05-21 DIAGNOSIS — M24.052 LOOSE BODY IN LEFT HIP: ICD-10-CM

## 2024-05-21 PROCEDURE — 97110 THERAPEUTIC EXERCISES: CPT | Mod: GP | Performed by: PHYSICAL THERAPIST

## 2024-05-21 PROCEDURE — 97140 MANUAL THERAPY 1/> REGIONS: CPT | Mod: GP | Performed by: PHYSICAL THERAPIST

## 2024-05-21 NOTE — PROGRESS NOTES
"  Physical Therapy Treatment    Patient Name: Christine Bustos  MRN: [id number]  Today's Date: 5/21/2024  Visit: 14/30      Diagnosis:   1. Left hip pain        2. Femoroacetabular impingement of left hip        PRECAUTIONS:  Left hip arthroscopy 3/26/24    SUBJECTIVE:  Pt reports an incident earlier this morning when she felt her left hip \"lock up\" and had hard time extending and walking, resolved in 20 min and better now.  Currently 0/10 left hip     OBJECTIVE:  Prone left  hip AROM: IR 45, E 15 degrees    TREATMENT:  - Therex:   Upright bike (seat 3) x 8 min L3  Kneeling hip flexor stretch 5x30  Standing quad/HF stretch  Rock backs x 10  Bird dogs 2x10 ea  Prone hip ER iso 10x10 sec  Prone hip IR iso 10x10 sec  Clam shell 2x15  SL hip abd 2x15  Seated butterfly stretch 3x30 sec  Fall out x 10  Figure 4 stretch 10x10 sec  SL bridge 2x10  Bridge 1x15  TG squat 3x15 L7+3  Total hip abd 3x10 L3  Step ups 2x15 8\"  Step down 2x10 4\"  SLS 10/10 sec sec  - Manual Therapy:  Circumduction, hip PROM in all directions, STM x 20 min  - Modalities:  CP x 10 min left hip    ASSESSMENT:  Some increased hip tightness today, glute strength improving but still limited, will add new stretches to HEP.    PLAN:   Continue with HEP, progress with protocol as tolerated.        "

## 2024-05-22 ENCOUNTER — DOCUMENTATION (OUTPATIENT)
Dept: ORTHOPEDIC SURGERY | Facility: HOSPITAL | Age: 39
End: 2024-05-22
Payer: COMMERCIAL

## 2024-05-22 NOTE — LETTER
May 22, 2024     Patient: Christine Bustos   YOB: 1985   Date of Visit: 5/22/2024       To Whom It May Concern:    Christine Bustos had left hip arthroscopy on 3/26/2024.  She may return to work full-time as of 6/3/2024.  She will need to work from home full-time until her physical therapy has been completed to optimize her left hip from surgery.      If you have any questions or concerns, please don't hesitate to call.         Sincerely,         [place] PA-C        CC: No Recipients

## 2024-05-24 ENCOUNTER — TREATMENT (OUTPATIENT)
Dept: PHYSICAL THERAPY | Facility: CLINIC | Age: 39
End: 2024-05-24
Payer: COMMERCIAL

## 2024-05-24 DIAGNOSIS — M24.052 LOOSE BODY IN LEFT HIP: ICD-10-CM

## 2024-05-24 DIAGNOSIS — S73.192A TEAR OF LEFT ACETABULAR LABRUM, INITIAL ENCOUNTER: ICD-10-CM

## 2024-05-24 DIAGNOSIS — M25.552 LEFT HIP PAIN: ICD-10-CM

## 2024-05-24 DIAGNOSIS — M25.852 FEMOROACETABULAR IMPINGEMENT OF LEFT HIP: ICD-10-CM

## 2024-05-24 PROCEDURE — 97110 THERAPEUTIC EXERCISES: CPT | Mod: GP | Performed by: PHYSICAL THERAPIST

## 2024-05-24 PROCEDURE — 97140 MANUAL THERAPY 1/> REGIONS: CPT | Mod: GP | Performed by: PHYSICAL THERAPIST

## 2024-05-24 NOTE — PROGRESS NOTES
"  Physical Therapy Treatment    Patient Name: Christine Bustos  MRN: [id number]  Today's Date: 5/24/2024  Visit: 15/30      Diagnosis:   1. Left hip pain  Follow Up In Physical Therapy      2. Femoroacetabular impingement of left hip  Follow Up In Physical Therapy      3. Loose body in left hip  Follow Up In Physical Therapy      4. Tear of left acetabular labrum, initial encounter  Follow Up In Physical Therapy      PRECAUTIONS:  Left hip arthroscopy 3/26/24    SUBJECTIVE:  Pt reports increased hip soreness today [place].  Pain: currently 03/10 left hip     OBJECTIVE:  Left hip abd 3+/5    TREATMENT:  - Therex:   Upright bike (seat 3) x 8 min L3  Kneeling hip flexor stretch 5x30  Standing quad/HF stretch  Prone glute ext 2x10  Rock backs x 10  Bird dogs 2x15 ea  Prone hip ER iso 10x10 sec  Prone hip IR iso 10x10 sec  Clam shell 2x15  SL hip abd 2x15  Seated butterfly stretch 3x30 sec  Fall out x 10  Figure 4 stretch 10x10 sec  SL bridge 2x15  TG squat 3x15 L7+3  Total hip abd 2x10 L3  Step ups 2x15 8\"  Step down 2x10 4\"  - Manual Therapy:  Circumduction, hip PROM in all directions, STM x 20 min  - Modalities:  Held ice    ASSESSMENT:  Strength improving, some continued tightness noted.    PLAN:   Continue with HEP, progress with protocol as tolerated.        "

## 2024-05-28 ENCOUNTER — TREATMENT (OUTPATIENT)
Dept: PHYSICAL THERAPY | Facility: CLINIC | Age: 39
End: 2024-05-28
Payer: COMMERCIAL

## 2024-05-28 DIAGNOSIS — M25.852 FEMOROACETABULAR IMPINGEMENT OF LEFT HIP: ICD-10-CM

## 2024-05-28 DIAGNOSIS — S73.192A TEAR OF LEFT ACETABULAR LABRUM, INITIAL ENCOUNTER: ICD-10-CM

## 2024-05-28 DIAGNOSIS — M24.052 LOOSE BODY IN LEFT HIP: ICD-10-CM

## 2024-05-28 DIAGNOSIS — M25.552 LEFT HIP PAIN: ICD-10-CM

## 2024-05-28 PROCEDURE — 97140 MANUAL THERAPY 1/> REGIONS: CPT | Mod: GP | Performed by: PHYSICAL THERAPIST

## 2024-05-28 PROCEDURE — 97110 THERAPEUTIC EXERCISES: CPT | Mod: GP | Performed by: PHYSICAL THERAPIST

## 2024-05-31 ENCOUNTER — OFFICE VISIT (OUTPATIENT)
Dept: ORTHOPEDIC SURGERY | Facility: HOSPITAL | Age: 39
End: 2024-05-31
Payer: COMMERCIAL

## 2024-05-31 ENCOUNTER — TREATMENT (OUTPATIENT)
Dept: PHYSICAL THERAPY | Facility: CLINIC | Age: 39
End: 2024-05-31
Payer: COMMERCIAL

## 2024-05-31 DIAGNOSIS — S73.192A TEAR OF LEFT ACETABULAR LABRUM, INITIAL ENCOUNTER: ICD-10-CM

## 2024-05-31 DIAGNOSIS — S73.192D TEAR OF LEFT ACETABULAR LABRUM, SUBSEQUENT ENCOUNTER: Primary | ICD-10-CM

## 2024-05-31 DIAGNOSIS — M25.552 LEFT HIP PAIN: ICD-10-CM

## 2024-05-31 DIAGNOSIS — M24.052 LOOSE BODY IN LEFT HIP: ICD-10-CM

## 2024-05-31 DIAGNOSIS — M25.852 FEMOROACETABULAR IMPINGEMENT OF LEFT HIP: ICD-10-CM

## 2024-05-31 PROCEDURE — 97110 THERAPEUTIC EXERCISES: CPT | Mod: GP | Performed by: PHYSICAL THERAPIST

## 2024-05-31 PROCEDURE — 97140 MANUAL THERAPY 1/> REGIONS: CPT | Mod: GP | Performed by: PHYSICAL THERAPIST

## 2024-05-31 PROCEDURE — 99024 POSTOP FOLLOW-UP VISIT: CPT | Performed by: PHYSICIAN ASSISTANT

## 2024-05-31 NOTE — PROGRESS NOTES
"  Physical Therapy Treatment    Patient Name: Christine Bustos  MRN: [id number]  Today's Date: 5/31/2024  Visit: 17/30      Diagnosis:   1. Left hip pain  Follow Up In Physical Therapy      2. Femoroacetabular impingement of left hip  Follow Up In Physical Therapy      3. Loose body in left hip  Follow Up In Physical Therapy      4. Tear of left acetabular labrum, initial encounter  Follow Up In Physical Therapy      PRECAUTIONS:  Left hip arthroscopy 3/26/24    SUBJECTIVE:  Pt reports she met with Janel on ortho, she was concerned about hip tightness, rec continued manual and mobs, dry needling, don't push strengthening.  Pain: currently 0/10 left hip     OBJECTIVE:  Left hip abd 3+/5    TREATMENT:  - Therex:   Upright bike (seat 2) x 8 min L3  Kneeling hip flexor stretch 5x30  Standing quad/HF stretch  Prone glute ext 2x10  Rock backs x 10  Bird dogs 2x15 ea  Prone hip ER RTB x 15  Prone hip IR RTB x 15  Clam shell 2x15  SL hip abd 2x15  SL bridge 2x15   Seated butterfly stretch 3x30 sec  Figure 4 stretch 10x10 sec  SL TG squat 2x10  L7 - NT  Total hip abd 2x10 L3 - NT  Totla hip flexion 2x10 L2 - NT  Step ups 2x15 8\" with 10# KB in opposite UE  Step down 2x10 4\" - NT  - Manual Therapy:  Circumduction, hip PROM in all directions, STM x 20 min    ASSESSMENT:  Agree with Janel that patient is tighter than we want but she continues to progress with her ROM, most exercises at this point are body weight resisted exercises to activate musculature, specifically glutes, so we will continue, hopefully will benefit from dry needling.  PLAN:   Continue with HEP, progress with protocol as tolerated, focus on hip ROM.       "

## 2024-05-31 NOTE — PROGRESS NOTES
Christine is here little over 2 months out from her left hip arthroscopy.  Date of surgery 3/26/2024.  Overall she doing pretty well.  She does complain of some soreness and stiffness especially getting out of a car.  On exam she is definitely tight in all motions.  They are definitely working hard on this and PT is a do most of the visit focusing on her motion.  I think she benefit from some joint mobilizations as well as dry needling.  She is in agreement with this plan and she will discuss this with her therapist today.   I will plan to see her back in 6 weeks.      Janel Polk PA-C

## 2024-06-03 ENCOUNTER — TREATMENT (OUTPATIENT)
Dept: PHYSICAL THERAPY | Facility: CLINIC | Age: 39
End: 2024-06-03
Payer: COMMERCIAL

## 2024-06-03 DIAGNOSIS — M24.052 LOOSE BODY IN LEFT HIP: ICD-10-CM

## 2024-06-03 DIAGNOSIS — M25.852 FEMOROACETABULAR IMPINGEMENT OF LEFT HIP: ICD-10-CM

## 2024-06-03 DIAGNOSIS — M25.552 LEFT HIP PAIN: Primary | ICD-10-CM

## 2024-06-03 DIAGNOSIS — S73.192A TEAR OF LEFT ACETABULAR LABRUM, INITIAL ENCOUNTER: ICD-10-CM

## 2024-06-03 PROCEDURE — 97110 THERAPEUTIC EXERCISES: CPT | Mod: GP,CQ | Performed by: PHYSICAL THERAPY ASSISTANT

## 2024-06-03 PROCEDURE — 97140 MANUAL THERAPY 1/> REGIONS: CPT | Mod: GP,CQ | Performed by: PHYSICAL THERAPY ASSISTANT

## 2024-06-03 NOTE — PROGRESS NOTES
"  Physical Therapy Treatment    Patient Name: Christine Bustos  MRN: [id number]  Today's Date: 6/3/2024  Visit: 18/30      Diagnosis:   1. Left hip pain        2. Femoroacetabular impingement of left hip        3. Loose body in left hip        4. Tear of left acetabular labrum, initial encounter          PRECAUTIONS:  Left hip arthroscopy 3/26/24    SUBJECTIVE:  Pt reports no pain in particular entering therapy. She has her first schedule DN later today.     Pain: currently 0/10 left hip     OBJECTIVE:  Fairly tight external rot and hip flexion.     TREATMENT:  - Therex:   Upright bike (seat 2) x 8 min L3  Kneeling hip flexor stretch 5x30  Standing quad/HF at stairs, stretch 30\"x3  Prone glute ext 2x12  Rock backs x 12  Bird dogs 2x15 ea  Prone hip ER RTB x 15  Prone hip IR RTB x 15  Clam shell 2x15  SL hip abd 2x15  SL bridge 2x15   Seated butterfly stretch 3x30 sec  Piriformis str 10\"x10  Figure 4 stretch 10x10 sec  HLR (L) 30\"x3  SL TG squat 2x10  L7 - NT  Total hip abd 2x10 L3 - NT  Totla hip flexion 2x10 L2 - NT  Step ups 2x15 8\" with 10# KB in opposite UE  Step down 2x10 4\" - NT  - Manual Therapy:  Circumduction, hip PROM in all directions, STM x 15 min    ASSESSMENT:  Responded well to session. Cont to have lingering tightness in hip with mild but manageable discomfort.    PLAN:   Continue with HEP, progress with protocol as tolerated, focus on hip ROM.       "

## 2024-06-05 ENCOUNTER — TREATMENT (OUTPATIENT)
Dept: PHYSICAL THERAPY | Facility: CLINIC | Age: 39
End: 2024-06-05
Payer: COMMERCIAL

## 2024-06-05 DIAGNOSIS — M24.052 LOOSE BODY IN LEFT HIP: ICD-10-CM

## 2024-06-05 DIAGNOSIS — S73.192A TEAR OF LEFT ACETABULAR LABRUM, INITIAL ENCOUNTER: ICD-10-CM

## 2024-06-05 DIAGNOSIS — M25.552 LEFT HIP PAIN: Primary | ICD-10-CM

## 2024-06-05 DIAGNOSIS — M25.852 FEMOROACETABULAR IMPINGEMENT OF LEFT HIP: ICD-10-CM

## 2024-06-05 PROCEDURE — 97110 THERAPEUTIC EXERCISES: CPT | Mod: GP,CQ | Performed by: PHYSICAL THERAPY ASSISTANT

## 2024-06-05 NOTE — PROGRESS NOTES
"  Physical Therapy Treatment    Patient Name: Christine Bustos  MRN: [id number]  Today's Date: 6/5/2024  Visit: 19/30      Diagnosis:   1. Left hip pain        2. Femoroacetabular impingement of left hip        3. Loose body in left hip        4. Tear of left acetabular labrum, initial encounter            PRECAUTIONS:  Left hip arthroscopy 3/26/24    SUBJECTIVE:  Pt reports no pain today and felt fine after last session.     Pain: currently 0/10 left hip     OBJECTIVE:  Fairly tight external rot and hip flexion and extension    TREATMENT:  - Therex:   Upright bike (seat 2) x 8 min L3  Kneeling hip flexor stretch 5x30  HS str step 30\"x3  Seated trunk flexion 10\"x10  SKTC 30\"x3  HLR (L) 30\"x3  Passive hip circles, cw,ccw x15ea  Bent knee fall out str 30\"x3  Figure 4 stretch 10x10 sec  Piriformis str 10\"x10  Prone glute ext 2x12  Rock backs x 15  Bird dogs 2x15 ea  Clam shell 2x15  SL hip abd 2x15  SL bridge 2x15     NP  Prone hip ER RTB x 15  Prone hip IR RTB x 15  Seated butterfly stretch 3x30 sec  Standing quad/HF at stairs, stretch 30\"x3-np  SL TG squat 2x10  L7 - NT  Total hip abd 2x10 L3 - NT  Totla hip flexion 2x10 L2 - NT  Step ups 2x15 8\" with 10# KB in opposite UE  Step down 2x10 4\" - NT  - Manual Therapy:  Circumduction, hip PROM in all directions, STM x 10 min    ASSESSMENT:  Doing well to exercises. Hip capsule remains tight. Some pain with passive end range.    PLAN:   Continue with HEP, progress with protocol as tolerated, focus on hip ROM.       "

## 2024-06-11 ENCOUNTER — TREATMENT (OUTPATIENT)
Dept: PHYSICAL THERAPY | Facility: CLINIC | Age: 39
End: 2024-06-11
Payer: COMMERCIAL

## 2024-06-11 DIAGNOSIS — M25.552 LEFT HIP PAIN: ICD-10-CM

## 2024-06-11 DIAGNOSIS — M25.852 FEMOROACETABULAR IMPINGEMENT OF LEFT HIP: ICD-10-CM

## 2024-06-11 DIAGNOSIS — S73.192A TEAR OF LEFT ACETABULAR LABRUM, INITIAL ENCOUNTER: ICD-10-CM

## 2024-06-11 DIAGNOSIS — M24.052 LOOSE BODY IN LEFT HIP: ICD-10-CM

## 2024-06-11 PROCEDURE — 97140 MANUAL THERAPY 1/> REGIONS: CPT | Mod: GP | Performed by: PHYSICAL THERAPIST

## 2024-06-11 PROCEDURE — 97110 THERAPEUTIC EXERCISES: CPT | Mod: GP | Performed by: PHYSICAL THERAPIST

## 2024-06-11 NOTE — PROGRESS NOTES
"  Physical Therapy Treatment    Patient Name: Christine Bustos  MRN: [id number]  Today's Date: 6/11/2024  Visit: 20/30      Diagnosis:   1. Left hip pain  Follow Up In Physical Therapy      2. Femoroacetabular impingement of left hip  Follow Up In Physical Therapy      3. Loose body in left hip  Follow Up In Physical Therapy      4. Tear of left acetabular labrum, initial encounter  Follow Up In Physical Therapy      PRECAUTIONS:  Left hip arthroscopy 3/26/24    SUBJECTIVE:  Pt reports she's had 2 sessions of dry needling with soreness but no noticed improvement in ROM to this point.  Pain: 0-3/10 anterior left hip     OBJECTIVE:  + tenderness proximal left hip adductors    TREATMENT:  - Therex:   Upright bike (seat 2) x 8 min L3  Kneeling hip flexor stretch 5x30  Standing hip flexor/quad stretch 30\"x3  Rock backs x 10  Prone glute ext 2x12  Bird dogs 2x15 ea  Prone hip ER RTB x 15  Prone hip IR RTB x 15  Clam shell 2x15  SL hip abd 2x15  Figure 4 stretch 3x30 sec  Seated butterfly stretch 3x30 sec  SL bridge 2x15  TG squat 3x15 L7+1      Total hip abd 2x10 L3 - NT  Totla hip flexion 2x10 L2 - NT  Step ups 2x15 8\" with 10# KB in opposite UE - NT  Step down 2x10 4\" - NT  - Manual Therapy:  Hip PROM in all directions, STM, mobs x 15 min    ASSESSMENT:  PROM does appear to be improving as well as glute strength but continued capsular tightness noted.     PLAN:   Continue with manual and TE focusing on ROM, dry needling weekly.       "

## 2024-06-14 ENCOUNTER — APPOINTMENT (OUTPATIENT)
Dept: PHYSICAL THERAPY | Facility: CLINIC | Age: 39
End: 2024-06-14
Payer: COMMERCIAL

## 2024-06-14 ENCOUNTER — TREATMENT (OUTPATIENT)
Dept: PHYSICAL THERAPY | Facility: CLINIC | Age: 39
End: 2024-06-14
Payer: COMMERCIAL

## 2024-06-14 DIAGNOSIS — M25.852 FEMOROACETABULAR IMPINGEMENT OF LEFT HIP: Primary | ICD-10-CM

## 2024-06-14 PROCEDURE — 97110 THERAPEUTIC EXERCISES: CPT | Mod: GP | Performed by: PHYSICAL THERAPIST

## 2024-06-14 PROCEDURE — 97140 MANUAL THERAPY 1/> REGIONS: CPT | Mod: GP | Performed by: PHYSICAL THERAPIST

## 2024-06-14 NOTE — PROGRESS NOTES
"  Physical Therapy Treatment    Patient Name: Christine Bustos  MRN: [id number]  Today's Date: 6/14/2024  Visit: 21/MN    Diagnosis:   1. Femoroacetabular impingement of left hip        PRECAUTIONS:  Left hip arthroscopy 3/26/24    SUBJECTIVE:  Pt reports continued gradual improvement with the hip, exercises in gym going well, continues to have dry needling weekly.  Pain: 0-3/10 anterior left hip     OBJECTIVE:  Supine left hip passive ER in 90 degrees flexion to 60 degrees  Prone left hip passive ER to 45 degrees    TREATMENT:  - Therex:   Upright bike (seat 2) x 8 min L3  Kneeling hip flexor stretch 5x30  Standing hip flexor/quad stretch 30\"x3  Rock backs x 10  Prone glute ext 3x10  Bird dogs 2x15 ea  Prone hip ER RTB x 15  Prone hip IR RTB x 15  Clam shell 2x15  SL hip abd 2x15  Figure 4 stretch 3x30 sec  Seated butterfly stretch 3x30 sec  SL bridge 2x15  TG squat 3x15 L7+2    Total hip abd 2x10 L3 - NT  Totla hip flexion 2x10 L2 - NT  Step ups 2x15 8\" with 10# KB in opposite UE - NT  Step down 2x10 4\" - NT  - Manual Therapy:  Hip PROM in all directions, STM, mobs x 15 min    ASSESSMENT:  Less anxiety and guarding with manual, improved gait, good tolerance to all exercises, will start back to stepper at gym gradually.    PLAN:   Continue with manual and TE focusing on ROM, dry needling weekly.       "

## 2024-06-18 ENCOUNTER — TREATMENT (OUTPATIENT)
Dept: PHYSICAL THERAPY | Facility: CLINIC | Age: 39
End: 2024-06-18
Payer: COMMERCIAL

## 2024-06-18 DIAGNOSIS — M25.852 FEMOROACETABULAR IMPINGEMENT OF LEFT HIP: ICD-10-CM

## 2024-06-18 DIAGNOSIS — M24.052 LOOSE BODY IN LEFT HIP: ICD-10-CM

## 2024-06-18 DIAGNOSIS — M25.552 LEFT HIP PAIN: ICD-10-CM

## 2024-06-18 DIAGNOSIS — S73.192A TEAR OF LEFT ACETABULAR LABRUM, INITIAL ENCOUNTER: ICD-10-CM

## 2024-06-18 PROCEDURE — 97110 THERAPEUTIC EXERCISES: CPT | Mod: GP | Performed by: PHYSICAL THERAPIST

## 2024-06-18 PROCEDURE — 97140 MANUAL THERAPY 1/> REGIONS: CPT | Mod: GP | Performed by: PHYSICAL THERAPIST

## 2024-06-18 NOTE — PROGRESS NOTES
"  Physical Therapy Treatment    Patient Name: Christine Bustos  MRN: [id number]  Today's Date: 6/18/2024  Visit: 22/MN    Diagnosis:   1. Left hip pain  Follow Up In Physical Therapy      2. Femoroacetabular impingement of left hip  Follow Up In Physical Therapy      3. Loose body in left hip  Follow Up In Physical Therapy      4. Tear of left acetabular labrum, initial encounter  Follow Up In Physical Therapy      PRECAUTIONS:  Left hip arthroscopy 3/26/24    SUBJECTIVE:  Pt reports she had another session of dry needling yesterday, lot of pain, has not noticed any improvement in hip ROM.  Pain: 2/10 anterior left hip     OBJECTIVE:  Left active hip ext to 10 degrees    TREATMENT:  - Therex:   Elliptical x 8 min L1  Kneeling hip flexor stretch 5x30  Standing hip flexor/quad stretch 30\"x3  Rock backs x 10  Prone glute ext 2x15  Bird dogs 2x15 ea  Prone hip ER RTB x 15  Prone hip IR RTB x 15  Clam shell 2x15  SL hip abd 2x15  Figure 4 stretch 3x30 sec  Seated butterfly stretch 3x30 sec  SL bridge 2x15  TG squat 3x15 L7+2.5    Total hip abd 2x10 L3 - NT  Totla hip flexion 2x10 L2 - NT  Step ups 2x15 8\" with 10# KB in opposite UE - NT  Step down 2x10 4\" - NT  - Manual Therapy:  Hip PROM in all directions, STM, mobs x 15 min    ASSESSMENT:  Improved hip ROM, better glute contraction and hip extension.    PLAN:   Continue with manual and TE focusing on ROM, dry needling weekly.       "

## 2024-06-20 ENCOUNTER — TREATMENT (OUTPATIENT)
Dept: PHYSICAL THERAPY | Facility: CLINIC | Age: 39
End: 2024-06-20
Payer: COMMERCIAL

## 2024-06-20 DIAGNOSIS — M25.852 FEMOROACETABULAR IMPINGEMENT OF LEFT HIP: Primary | ICD-10-CM

## 2024-06-20 DIAGNOSIS — M25.552 LEFT HIP PAIN: ICD-10-CM

## 2024-06-20 PROCEDURE — 97140 MANUAL THERAPY 1/> REGIONS: CPT | Mod: GP | Performed by: PHYSICAL THERAPIST

## 2024-06-20 PROCEDURE — 97110 THERAPEUTIC EXERCISES: CPT | Mod: GP | Performed by: PHYSICAL THERAPIST

## 2024-06-20 NOTE — PROGRESS NOTES
"  Physical Therapy Treatment    Patient Name: hCristine Bustos  MRN: [id number]  Today's Date: 6/20/2024  Visit: 23/MN    Diagnosis:   1. Femoroacetabular impingement of left hip        2. Left hip pain        PRECAUTIONS:  Left hip arthroscopy 3/26/24    SUBJECTIVE:  Pt reports no significant change in hip since last session, exercises at gym going well.  HEP performed? Y  Pain: 2/10 anterior left hip     OBJECTIVE:  Left active hip ext to 10 degrees    TREATMENT:  - Therex:   Elliptical x 8 min L1  Kneeling hip flexor stretch 5x30  Standing hip flexor/quad stretch 30\"x3  Rock backs x 10  Prone glute ext 2x15  Bird dogs 2x15 ea  Prone hip ER RTB x 15  Prone hip IR RTB x 15  Clam shell 2x15  SL hip abd 2x15  Figure 4 stretch 3x30 sec  Seated butterfly stretch 3x30 sec  SL bridge 2x15  SL TG squat 3x10    Total hip abd 2x10 L3 - NT  Totla hip flexion 2x10 L2 - NT  Step ups 2x15 8\" with 10# KB in opposite UE - NT  Step down 2x10 4\" - NT  - Manual Therapy:  Hip PROM in all directions, STM, mobs x 15 min    ASSESSMENT:  Patient continued to progress with ROM and overall function, some continued anterior hip tightness but improving.    PLAN:  Progress to SL press at gym, continued focus on ROM.     "

## 2024-06-21 ENCOUNTER — APPOINTMENT (OUTPATIENT)
Dept: PHYSICAL THERAPY | Facility: CLINIC | Age: 39
End: 2024-06-21
Payer: COMMERCIAL

## 2024-06-25 ENCOUNTER — TREATMENT (OUTPATIENT)
Dept: PHYSICAL THERAPY | Facility: CLINIC | Age: 39
End: 2024-06-25
Payer: COMMERCIAL

## 2024-06-25 DIAGNOSIS — M25.852 FEMOROACETABULAR IMPINGEMENT OF LEFT HIP: Primary | ICD-10-CM

## 2024-06-25 DIAGNOSIS — M25.552 LEFT HIP PAIN: ICD-10-CM

## 2024-06-25 DIAGNOSIS — S73.192A TEAR OF LEFT ACETABULAR LABRUM, INITIAL ENCOUNTER: ICD-10-CM

## 2024-06-25 DIAGNOSIS — M24.052 LOOSE BODY IN LEFT HIP: ICD-10-CM

## 2024-06-25 PROCEDURE — 97110 THERAPEUTIC EXERCISES: CPT | Mod: GP | Performed by: PHYSICAL THERAPIST

## 2024-06-25 PROCEDURE — 97140 MANUAL THERAPY 1/> REGIONS: CPT | Mod: GP | Performed by: PHYSICAL THERAPIST

## 2024-06-25 NOTE — PROGRESS NOTES
"  Physical Therapy Treatment    Patient Name: Christine Bustos  MRN: [id number]  Today's Date: 6/25/2024  Visit: 24/MN    Diagnosis:   1. Femoroacetabular impingement of left hip        PRECAUTIONS:  Left hip arthroscopy 3/26/24    SUBJECTIVE:  Pt reports no current pain in the hip, continues with dry needling, exercises going well.  HEP performed? Y  Pain: 0/10 anterior left hip     OBJECTIVE:  Standing left hip flexion to 45 degrees    TREATMENT:  - Therex:   Elliptical x 8 min L3  Kneeling hip flexor stretch 5x30  Standing hip flexor/quad stretch 30\"x3  Rock backs x 10  Prone glute ext 2x15  Bird dogs 2x15 ea  Prone hip ER RTB x 15  Prone hip IR RTB x 15  Clam shell 2x15  SL hip abd 2x15  Figure 4 stretch 3x30 sec  Seated butterfly stretch 3x30 sec  SL bridge 2x15  SL TG squat 3x10  Reverse lunge slide 2x10  Total hip flexion 2x10 L2    Step ups 2x15 8\" with 10# KB in opposite UE - NT  Step down 2x10 4\" - NT  - Manual Therapy:  Hip PROM in all directions, STM, mobs x 15 min    ASSESSMENT:  Patient's PROM continues to slowly improve, continued weakness noted but also improving, gradually increasing strength exercises.    PLAN:  Continue with HEP and dry needling, gradual progress with strengthening.     "

## 2024-06-28 ENCOUNTER — TREATMENT (OUTPATIENT)
Dept: PHYSICAL THERAPY | Facility: CLINIC | Age: 39
End: 2024-06-28
Payer: COMMERCIAL

## 2024-06-28 DIAGNOSIS — M25.852 FEMOROACETABULAR IMPINGEMENT OF LEFT HIP: Primary | ICD-10-CM

## 2024-06-28 DIAGNOSIS — M25.552 LEFT HIP PAIN: ICD-10-CM

## 2024-06-28 PROCEDURE — 97110 THERAPEUTIC EXERCISES: CPT | Mod: GP | Performed by: PHYSICAL THERAPIST

## 2024-06-28 PROCEDURE — 97140 MANUAL THERAPY 1/> REGIONS: CPT | Mod: GP | Performed by: PHYSICAL THERAPIST

## 2024-06-28 NOTE — PROGRESS NOTES
"  Physical Therapy Treatment    Patient Name: Christine Bustos  MRN: [id number]  Today's Date: 6/28/2024  Visit: 25/MN    Diagnosis:   1. Femoroacetabular impingement of left hip        2. Left hip pain        PRECAUTIONS:  Left hip arthroscopy 3/26/24    SUBJECTIVE:  Pt reports dry needling went well with less pain, can flex left hip higher now and can put socks on with less difficulty.  HEP performed? Y  Pain: 0-3/10 anterior left hip     OBJECTIVE:  Standing left hip flexion to 45 degrees    TREATMENT:  - Therex:   Elliptical x 8 min L3  Kneeling hip flexor stretch 5x30  Standing hip flexor/quad stretch 30\"x3  Rock backs x 10  Prone glute ext 2x15  Bird dogs 2x15 ea  Prone hip ER RTB x 15  Prone hip IR RTB x 15  Clam shell 2x15  SL hip abd 2x15  Figure 4 stretch 3x30 sec  Seated butterfly stretch 3x30 sec  SL bridge 2x15  SL TG squat 3x10 L7+1/2  Leg extension 2x10 10#  Reverse lunge slide 2x15  Total hip flexion 2x10 L2  - Manual Therapy:  Hip mobilization and STM x 15 min    ASSESSMENT:  Patient's ROM improving, still very weak and hesitant with motions.    PLAN:  Continue with HEP and progressive therapy, will be meeting with ortho on 7/10 to decide if injection is warranted.     "

## 2024-07-01 ASSESSMENT — PROMIS GLOBAL HEALTH SCALE
RATE_AVERAGE_PAIN: 2
RATE_GENERAL_HEALTH: GOOD
EMOTIONAL_PROBLEMS: OFTEN
RATE_AVERAGE_FATIGUE: MODERATE
RATE_MENTAL_HEALTH: GOOD
CARRYOUT_SOCIAL_ACTIVITIES: GOOD
CARRYOUT_PHYSICAL_ACTIVITIES: MODERATELY
RATE_QUALITY_OF_LIFE: GOOD
RATE_PHYSICAL_HEALTH: GOOD
RATE_SOCIAL_SATISFACTION: GOOD

## 2024-07-02 ENCOUNTER — TREATMENT (OUTPATIENT)
Dept: PHYSICAL THERAPY | Facility: CLINIC | Age: 39
End: 2024-07-02
Payer: COMMERCIAL

## 2024-07-02 DIAGNOSIS — M25.552 LEFT HIP PAIN: ICD-10-CM

## 2024-07-02 DIAGNOSIS — M25.852 FEMOROACETABULAR IMPINGEMENT OF LEFT HIP: Primary | ICD-10-CM

## 2024-07-02 PROCEDURE — 97140 MANUAL THERAPY 1/> REGIONS: CPT | Mod: GP | Performed by: PHYSICAL THERAPIST

## 2024-07-02 PROCEDURE — 97110 THERAPEUTIC EXERCISES: CPT | Mod: GP | Performed by: PHYSICAL THERAPIST

## 2024-07-02 NOTE — PROGRESS NOTES
"  Physical Therapy Treatment    Patient Name: Christine Bustos  MRN: [id number]  Today's Date: 7/2/2024  Visit: 26/MN    Diagnosis:   1. Femoroacetabular impingement of left hip        2. Left hip pain        PRECAUTIONS:  Left hip arthroscopy 3/26/24    SUBJECTIVE:  Pt reports she notices improvement but it's slow, walking still doesn't feel fluid and she has occasional pain.  HEP performed? Y  Pain: 0-3/10 anterior left hip     OBJECTIVE:  Standing left hip flexion to 45 degrees    TREATMENT:  - Therex:   Elliptical x 8 min L5  Kneeling hip flexor stretch 5x30  Standing hip flexor/quad stretch 30\"x3  Rock backs x 10  Prone glute ext 2x15  Bird dogs 2x15 ea  Prone hip ER RTB x 15  Prone hip IR RTB x 15  Clam shell 2x15  SL hip abd 2x15  Figure 4 stretch 3x30 sec  Seated butterfly stretch 3x30 sec  SL bridge 2x15  SL TG squat 3x10 L7+1/2  Leg extension 2x10 10#  Reverse lunge slide 2x15  Total hip flexion 2x10 L2  - Manual Therapy:  Hip mobilization in all directions, STM anterior hip x 15 min    ASSESSMENT:  Patient's hip ROM continues to slowly improve, good tolerance to mobs with motion, continued glute weakness noted.    PLAN:  Continue with HEP and progressive therapy, will be meeting with ortho on 7/10 to decide if injection is warranted.     "

## 2024-07-05 ENCOUNTER — TREATMENT (OUTPATIENT)
Dept: PHYSICAL THERAPY | Facility: CLINIC | Age: 39
End: 2024-07-05
Payer: COMMERCIAL

## 2024-07-05 ENCOUNTER — APPOINTMENT (OUTPATIENT)
Dept: PRIMARY CARE | Facility: CLINIC | Age: 39
End: 2024-07-05
Payer: COMMERCIAL

## 2024-07-05 DIAGNOSIS — M25.552 LEFT HIP PAIN: ICD-10-CM

## 2024-07-05 DIAGNOSIS — M25.852 FEMOROACETABULAR IMPINGEMENT OF LEFT HIP: Primary | ICD-10-CM

## 2024-07-05 PROCEDURE — 97110 THERAPEUTIC EXERCISES: CPT | Mod: GP | Performed by: PHYSICAL THERAPIST

## 2024-07-05 PROCEDURE — 97140 MANUAL THERAPY 1/> REGIONS: CPT | Mod: GP | Performed by: PHYSICAL THERAPIST

## 2024-07-05 NOTE — PROGRESS NOTES
"  Physical Therapy Treatment    Patient Name: Christine Bustos  MRN: [id number]  Today's Date: 7/5/2024  Visit: 27/MN    Diagnosis:   1. Femoroacetabular impingement of left hip        2. Left hip pain        PRECAUTIONS:  Left hip arthroscopy 3/26/24    SUBJECTIVE:  Pt reports she had some soreness in the front of the hip yesterday while playing miniature golf, no current pain.  HEP performed? Y  Pain: 0-3/10 anterior left hip     OBJECTIVE:  Standing left hip flexion to 90 degrees    TREATMENT:  - Therex:   Elliptical x 8 min L5  Kneeling hip flexor stretch 5x30  Standing hip flexor/quad stretch 30\"x3  Rock backs x 10  Prone glute ext 2x15  Bird dogs 2x15 ea  Prone hip ER RTB x 15  Prone hip IR RTB x 15  Clam shell 2x15 YTB  SL hip abd 2x15 YTB  Figure 4 stretch 3x30 sec  Seated butterfly stretch 3x30 sec  SL bridge 2x15  SL TG squat 3x10 L7+1/2  Leg extension 2x15 10#  2-way lunge slide 2x10  Total hip flexion [id number]  - Manual Therapy:  Hip mobilization in all directions, STM anterior hip x 15 min    ASSESSMENT:  Patient continues to demonstrate anterior hip tightness limiting extension and ER, will add prone \"roadkill\" position and lateral lunge slides to HEP, strength progressing nicely.    PLAN:  Continue with HEP, add new exercises and continue to work on flexibility.     "

## 2024-07-09 ENCOUNTER — TREATMENT (OUTPATIENT)
Dept: PHYSICAL THERAPY | Facility: CLINIC | Age: 39
End: 2024-07-09
Payer: COMMERCIAL

## 2024-07-09 DIAGNOSIS — M25.552 LEFT HIP PAIN: ICD-10-CM

## 2024-07-09 DIAGNOSIS — M25.852 FEMOROACETABULAR IMPINGEMENT OF LEFT HIP: Primary | ICD-10-CM

## 2024-07-09 PROCEDURE — 97110 THERAPEUTIC EXERCISES: CPT | Mod: GP | Performed by: PHYSICAL THERAPIST

## 2024-07-09 PROCEDURE — 97140 MANUAL THERAPY 1/> REGIONS: CPT | Mod: GP | Performed by: PHYSICAL THERAPIST

## 2024-07-09 NOTE — PROGRESS NOTES
"  Physical Therapy Treatment    Patient Name: Christine Bustos  MRN: [id number]  Today's Date: 7/9/2024  Visit: 28/MN    Diagnosis:   1. Femoroacetabular impingement of left hip        2. Left hip pain        PRECAUTIONS:  Left hip arthroscopy 3/26/24    SUBJECTIVE:  Pt reports continued intermittent anterior left hip soreness and tightness, meets with ortho tomorrow.    HEP performed? Y    Pain: 0-4/10 anterior left hip     OBJECTIVE:  Standing left hip flexion to 90 degrees    TREATMENT:  - Therex:   Elliptical x 8 min L5  Kneeling hip flexor stretch 5x30  Standing hip flexor/quad stretch 30\"x3  Rock backs x 10  Prone glute ext 2x15  Bird dogs 2x15 ea  Prone hip ER RTB x 15  Prone hip IR RTB x 15  Clam shell 2x15 YTB  SL hip abd 2x15 YTB  Figure 4 stretch 3x30 sec  Seated butterfly stretch 3x30 sec  SL bridge 2x15  SL TG squat 3x10 L7+1/2  Leg extension 2x15 10#  2-way lunge slide 2x10  Total hip flexion 2x10 L2  - Manual Therapy:  Hip mobilization in all directions, STM anterior hip x 15 min    ASSESSMENT:  Patient with continued c/o anterior hip tightness but does appear to be improving with ROM, will email ortho with update prior to appointment tomorrow.    PLAN:  Continue with HEP, will meet with ortho tomorrow to discuss possible injection.     "

## 2024-07-10 ENCOUNTER — OFFICE VISIT (OUTPATIENT)
Dept: ORTHOPEDIC SURGERY | Facility: HOSPITAL | Age: 39
End: 2024-07-10
Payer: COMMERCIAL

## 2024-07-10 DIAGNOSIS — S73.192D TEAR OF LEFT ACETABULAR LABRUM, SUBSEQUENT ENCOUNTER: Primary | ICD-10-CM

## 2024-07-10 PROCEDURE — 99213 OFFICE O/P EST LOW 20 MIN: CPT | Performed by: PHYSICIAN ASSISTANT

## 2024-07-10 NOTE — PROGRESS NOTES
Patient returns for postop follow-up of left hip scope DOS 3/26/24      Patient is here today little over 3 months out from her left hip arthroscopy.  Date of surgery 3/26/2024.  Overall she is doing well.  Her range of motion has improved but she continues to have soreness over the hip flexor and abductor.  She is then getting a lot of deep tissue and manual therapy but is just having some issue getting this left little bit of motion.  I think she would really benefit from a couple sessions of dry needling and we will help get her set up here and TSN like for that.  She will continue her formal therapy [place].  I will see her back in 6 weeks.        Janel Polk PA-C

## 2024-07-12 ENCOUNTER — TREATMENT (OUTPATIENT)
Dept: PHYSICAL THERAPY | Facility: CLINIC | Age: 39
End: 2024-07-12
Payer: COMMERCIAL

## 2024-07-12 DIAGNOSIS — M25.552 LEFT HIP PAIN: ICD-10-CM

## 2024-07-12 DIAGNOSIS — M25.852 FEMOROACETABULAR IMPINGEMENT OF LEFT HIP: Primary | ICD-10-CM

## 2024-07-12 PROCEDURE — 97110 THERAPEUTIC EXERCISES: CPT | Mod: GP | Performed by: PHYSICAL THERAPIST

## 2024-07-12 PROCEDURE — 97140 MANUAL THERAPY 1/> REGIONS: CPT | Mod: GP | Performed by: PHYSICAL THERAPIST

## 2024-07-12 NOTE — PROGRESS NOTES
"  Physical Therapy Treatment    Patient Name: Christine Bustos  MRN: [id number]  Today's Date: 7/12/2024  Visit: 29/MN    Diagnosis:   1. Femoroacetabular impingement of left hip        2. Left hip pain        PRECAUTIONS:  Left hip arthroscopy 3/26/24    SUBJECTIVE:  Pt reports continued intermittent anterior left hip soreness and tightness with specific exercises or too much activity, met with KELSIE Chu in ortho who recommended she change dry needling practitioner and meet with another therapist for DTM.    HEP performed? Y    Pain: 0-4/10 anterior left hip     OBJECTIVE:  +TTP proximal adductors and anterior hip  Prone hip ER to 30 degrees    TREATMENT:  - Manual Therapy:  Hip mobilization in all directions, DTM anterior hip and proximal adductors x 15 min  - Therex:   Elliptical x 8 min L5  Kneeling hip flexor stretch 5x30  Standing hip flexor/quad stretch 30\"x3  Rock backs x 10  Prone glute ext 2x15  Bird dogs 2x15 ea  Prone hip ER RTB x 15  Prone hip IR RTB x 15  Clam shell 2x15 YTB  SL hip abd 2x15 YTB  Figure 4 stretch 3x30 sec  Seated butterfly stretch 3x30 sec  SL bridge 2x15  SL TG squat 3x10 L7+1/2  2-way lunge slide 2x10    ASSESSMENT:  Patient with continued proximal adductor and hip flexor irritation, also with sings of anterior capsule tightness limiting hip extension and ER, glute strength does appear to be retuning at this point but was delayed.    PLAN:   Continue with current routine, meet with new practitioner for dry needling and DTM.  "

## 2024-07-16 ENCOUNTER — TREATMENT (OUTPATIENT)
Dept: PHYSICAL THERAPY | Facility: CLINIC | Age: 39
End: 2024-07-16
Payer: COMMERCIAL

## 2024-07-16 ENCOUNTER — OFFICE VISIT (OUTPATIENT)
Dept: PRIMARY CARE | Facility: CLINIC | Age: 39
End: 2024-07-16
Payer: COMMERCIAL

## 2024-07-16 VITALS — SYSTOLIC BLOOD PRESSURE: 102 MMHG | DIASTOLIC BLOOD PRESSURE: 65 MMHG

## 2024-07-16 DIAGNOSIS — M25.852 FEMOROACETABULAR IMPINGEMENT OF LEFT HIP: Primary | ICD-10-CM

## 2024-07-16 DIAGNOSIS — H61.22 IMPACTED CERUMEN OF LEFT EAR: ICD-10-CM

## 2024-07-16 DIAGNOSIS — D72.819 LEUKOPENIA, UNSPECIFIED TYPE: ICD-10-CM

## 2024-07-16 DIAGNOSIS — R22.31 AXILLARY LUMP, RIGHT: Primary | ICD-10-CM

## 2024-07-16 DIAGNOSIS — M25.552 LEFT HIP PAIN: ICD-10-CM

## 2024-07-16 DIAGNOSIS — E55.9 VITAMIN D DEFICIENCY: ICD-10-CM

## 2024-07-16 PROCEDURE — 97110 THERAPEUTIC EXERCISES: CPT | Mod: GP | Performed by: PHYSICAL THERAPIST

## 2024-07-16 PROCEDURE — 97140 MANUAL THERAPY 1/> REGIONS: CPT | Mod: GP | Performed by: PHYSICAL THERAPIST

## 2024-07-16 PROCEDURE — 99214 OFFICE O/P EST MOD 30 MIN: CPT | Performed by: FAMILY MEDICINE

## 2024-07-16 NOTE — PROGRESS NOTES
"  Physical Therapy Treatment    Patient Name: Christine Bustos  MRN: [id number]  Today's Date: 7/16/2024  Visit: 30/MN    Diagnosis:   1. Femoroacetabular impingement of left hip        2. Left hip pain        PRECAUTIONS:  Left hip arthroscopy 3/26/24    SUBJECTIVE:  Pt reports some sharp pain yesterday in the hip while performing hip thrusts, has not heard from ortho regarding dry needling site.    HEP performed? Y    Pain: 0-4/10 anterior left hip     OBJECTIVE:  +TTP proximal adductors and anterior hip  Prone hip ER to 30 degrees    TREATMENT:  - Manual Therapy:  Hip mobilization in all directions, DTM anterior hip and proximal adductors x 15 min  - Therex:   Elliptical x 8 min L5  Kneeling hip flexor stretch 5x30  Standing hip flexor/quad stretch 30\"x3  Rock backs x 10  Prone glute ext 2x15  Bird dogs 2x15 ea  Prone hip ER RTB x 15  Prone hip IR RTB x 15  Clam shell 2x15 YTB  SL hip abd 2x15 YTB  Figure 4 stretch 3x30 sec  Seated butterfly stretch 3x30 sec  SL bridge 2x15  SL TG squat 3x10 L7+1  2-way lunge slide 2x10    ASSESSMENT:  Patient tolerating exercises and manual well, does seem to be seeing slight improvement in hip ROM but continued anterior tightness, will be seen 1 more time this week and will be out of town next week.    PLAN:  Continue with current routine, meet with new practitioner for dry needling and DTM.  "

## 2024-07-16 NOTE — PROGRESS NOTES
Subjective   Patient ID: Christine Bustos is a 38 y.o. female who presents for rt axillary lump for 8 mos and left ear discomfort for 3 weeks  HPI   Pt had hx of  vit D level being low. pt has been feeling fatigue even with vitd 1000 units every day. Wbc has been low. Pt was concerned. Pt cont to have rt axillary tender lump with fluctuating size. pt started Lt ear discomfort lately. no signficant hearing loss, tinnitus, HA, dizziness, imbalance. no fever or chills. normal appetite.  no neck stiffness. no ear drainage. ear ache persisted today, no weakness or numbness      Review of Systems    Objective   /65     Physical Exam  NAD, Well groomed, eyes: PERRLA. no sclera icterus, no sinus area tenderness, clear nasal discharge, Lt external ear canal cerumen impaction, no erythema, no drainage, no TM erythema or bulging. no Lt mastoid tenderness, neck is supple, no cervical lymphadenopathy, possible left axillary lymphadenopathy.   lungs: CTA b/l, heart: RRR, abd: soft, NDNT, BS+. Good balance.    Assessment/Plan   Problem List Items Addressed This Visit             ICD-10-CM    Axillary lump, right - Primary R22.31     Persistent. Recommend surgeon eval         Impacted cerumen of left ear H61.22     Recommend debrox 4 drops to left ear 4 times a day for a few days and then irrigate with water.          Vitamin D deficiency E55.9     Check vit d elvel.         Relevant Orders    Vitamin D 25-Hydroxy,Total (for eval of Vitamin D levels)    Leukopenia D72.819     Will monitor wbc         Relevant Orders    CBC and Auto Differential

## 2024-07-18 ENCOUNTER — LAB (OUTPATIENT)
Dept: LAB | Facility: LAB | Age: 39
End: 2024-07-18
Payer: COMMERCIAL

## 2024-07-18 ENCOUNTER — TREATMENT (OUTPATIENT)
Dept: PHYSICAL THERAPY | Facility: CLINIC | Age: 39
End: 2024-07-18
Payer: COMMERCIAL

## 2024-07-18 DIAGNOSIS — M25.552 LEFT HIP PAIN: ICD-10-CM

## 2024-07-18 DIAGNOSIS — D72.819 LEUKOPENIA, UNSPECIFIED TYPE: ICD-10-CM

## 2024-07-18 DIAGNOSIS — E55.9 VITAMIN D DEFICIENCY: ICD-10-CM

## 2024-07-18 DIAGNOSIS — M25.852 FEMOROACETABULAR IMPINGEMENT OF LEFT HIP: Primary | ICD-10-CM

## 2024-07-18 LAB
25(OH)D3 SERPL-MCNC: 42 NG/ML (ref 30–100)
BASOPHILS # BLD AUTO: 0.03 X10*3/UL (ref 0–0.1)
BASOPHILS NFR BLD AUTO: 0.7 %
EOSINOPHIL # BLD AUTO: 0.08 X10*3/UL (ref 0–0.7)
EOSINOPHIL NFR BLD AUTO: 2 %
ERYTHROCYTE [DISTWIDTH] IN BLOOD BY AUTOMATED COUNT: 12.3 % (ref 11.5–14.5)
HCT VFR BLD AUTO: 38.2 % (ref 36–46)
HGB BLD-MCNC: 12.5 G/DL (ref 12–16)
IMM GRANULOCYTES # BLD AUTO: 0 X10*3/UL (ref 0–0.7)
IMM GRANULOCYTES NFR BLD AUTO: 0 % (ref 0–0.9)
LYMPHOCYTES # BLD AUTO: 1.28 X10*3/UL (ref 1.2–4.8)
LYMPHOCYTES NFR BLD AUTO: 31.2 %
MCH RBC QN AUTO: 31.6 PG (ref 26–34)
MCHC RBC AUTO-ENTMCNC: 32.7 G/DL (ref 32–36)
MCV RBC AUTO: 97 FL (ref 80–100)
MONOCYTES # BLD AUTO: 0.35 X10*3/UL (ref 0.1–1)
MONOCYTES NFR BLD AUTO: 8.5 %
NEUTROPHILS # BLD AUTO: 2.36 X10*3/UL (ref 1.2–7.7)
NEUTROPHILS NFR BLD AUTO: 57.6 %
NRBC BLD-RTO: 0 /100 WBCS (ref 0–0)
PLATELET # BLD AUTO: 213 X10*3/UL (ref 150–450)
RBC # BLD AUTO: 3.95 X10*6/UL (ref 4–5.2)
WBC # BLD AUTO: 4.1 X10*3/UL (ref 4.4–11.3)

## 2024-07-18 PROCEDURE — 97110 THERAPEUTIC EXERCISES: CPT | Mod: GP | Performed by: PHYSICAL THERAPIST

## 2024-07-18 PROCEDURE — 97140 MANUAL THERAPY 1/> REGIONS: CPT | Mod: GP | Performed by: PHYSICAL THERAPIST

## 2024-07-18 PROCEDURE — 82306 VITAMIN D 25 HYDROXY: CPT

## 2024-07-18 PROCEDURE — 85025 COMPLETE CBC W/AUTO DIFF WBC: CPT

## 2024-07-18 PROCEDURE — 36415 COLL VENOUS BLD VENIPUNCTURE: CPT

## 2024-07-18 NOTE — PROGRESS NOTES
"  Physical Therapy Treatment    Patient Name: Christine Bustos  MRN: [id number]  Today's Date: 7/18/2024  Visit: 31/MN    Diagnosis:   1. Femoroacetabular impingement of left hip        2. Left hip pain        PRECAUTIONS:  Left hip arthroscopy 3/26/24    SUBJECTIVE:  Pt reports some sharp pain yesterday in the hip while performing hip thrusts, has not heard from ortho regarding dry needling site.    HEP performed? Y    Pain: 0-4/10 anterior left hip     OBJECTIVE:  +TTP proximal adductors and anterior hip  Prone hip ER to 30 degrees    TREATMENT:  - Manual Therapy:  Hip mobilization in all directions, DTM anterior hip and proximal adductors x 20 min  - Therex:   Elliptical x 8 min L5  Kneeling hip flexor stretch 5x30  Standing hip flexor/quad stretch 30\"x3  Rock backs x 10  Prone glute ext 2x15  Bird dogs 2x15 ea  Prone hip ER RTB x 15  Prone hip IR RTB x 15  Clam shell 2x15 YTB  SL hip abd 2x15 YTB  Figure 4 stretch 3x30 sec  Seated butterfly stretch 3x30 sec  SL bridge 2x15  SL TG squat 3x10 L7+1  2-way lunge slide 2x10    ASSESSMENT:  No significant improvement with mobilization and DTM, continued anterior capsular tightness noted limiting ER and ext, has not heard back yet from new dry needing practitioner.    PLAN:  Continue with current routine, see 1 time next week as patient will be out of town.  "

## 2024-07-19 ENCOUNTER — TREATMENT (OUTPATIENT)
Dept: PHYSICAL THERAPY | Facility: HOSPITAL | Age: 39
End: 2024-07-19

## 2024-07-19 DIAGNOSIS — D72.819 LEUKOPENIA, UNSPECIFIED TYPE: Primary | ICD-10-CM

## 2024-07-19 DIAGNOSIS — M25.852 FEMOROACETABULAR IMPINGEMENT OF LEFT HIP: Primary | ICD-10-CM

## 2024-07-19 DIAGNOSIS — M25.552 LEFT HIP PAIN: ICD-10-CM

## 2024-07-19 PROCEDURE — 4200000004 HC PT PHASE II 15 MIN CHG: Mod: GP

## 2024-07-19 NOTE — PROGRESS NOTES
Phase II Visit - Rehabilitation Services    Patient Name: Christine Bustos  MRN: [id number]  Today's Date: 7/19/2024  Time Calculation  Start Time: 1130  Stop Time: 1200  Time Calculation (min): 30 min      Visit number: 1      Current Problem:  Patient is being seen at [facility] for cash-based (phase II) visit for: dry needling         Subjective:  Patient referred by Janel Polk PA-C for needling. She has been participating in physical therapy post op at [place]- doing lots of manual and soft tissue work but still having a great deal of pain and stiffness. She has had dry needling by chiropractor but has not had much relief. Patient reports continued pain with majority of ADLs, walking long distances, antalgia with gait intermittently and almost constant discomfort.         Objective:  Pain 2-4/10 L anterior hip     + hypertonicity L rectus, adductors, TFL, glute med and max     Limited ER and abduction with capsular/ firm end feel         Treatment Performed:  Prone PA in figure four grade III-IV joint mobs  Caudal glide with belt grade III-IV    Instructed patient in at home joint mobilizations with band     DN: 0.3.x 50 mm     Assessment:  Patient educated on dry needling precautions, indications and contraindications. Patient is aware of risks and benefits of procedure and provided informed consent. No adverse reaction to needling- pt does not like needles and was anxious during treatment but tolerated well. Pt educated to expect soreness for 24-36 hours, avoid NSAIDs and use heat as needed. Encouraged to drink water today as well to stay hydrated.         Plan:  Continue with dry needling for tissue restrictions PRN.             Glenys Maurer, PT

## 2024-07-25 ENCOUNTER — TREATMENT (OUTPATIENT)
Dept: PHYSICAL THERAPY | Facility: CLINIC | Age: 39
End: 2024-07-25
Payer: COMMERCIAL

## 2024-07-25 DIAGNOSIS — M25.852 FEMOROACETABULAR IMPINGEMENT OF LEFT HIP: ICD-10-CM

## 2024-07-25 PROCEDURE — 97140 MANUAL THERAPY 1/> REGIONS: CPT | Mod: GP | Performed by: PHYSICAL THERAPIST

## 2024-07-25 PROCEDURE — 97110 THERAPEUTIC EXERCISES: CPT | Mod: GP | Performed by: PHYSICAL THERAPIST

## 2024-07-25 NOTE — PROGRESS NOTES
"  Physical Therapy Treatment    Patient Name: Christine Bustos  MRN: [id number]  Today's Date: 7/25/2024  Visit: 32/MN    Diagnosis:   1. Femoroacetabular impingement of left hip  Follow Up In Physical Therapy      PRECAUTIONS:  Left hip arthroscopy 3/26/24    SUBJECTIVE:  Pt reports she did have dry needling with new practitioner at [facility], had a lot of pain following procedure and reports it was very different from her previous dry needling experience, unclear if she has felt any benefit to this point, was at [facility] all week, did feel walking may have been slightly better.    HEP performed? Y    Pain: 0-4/10 anterior left hip     OBJECTIVE:  +TTP proximal adductors and anterior hip  Prone hip ER to 30 degrees    TREATMENT:  - Manual Therapy:  Hip mobilization in all directions, DTM anterior hip and proximal adductors x 20 min  - Therex:   Elliptical x 8 min L5  Kneeling hip flexor stretch 5x30  Standing hip flexor/quad stretch 30\"x3  Rock backs x 10  Prone glute ext 2x15  Bird dogs 2x15 ea  Prone hip ER RTB x 15  Prone hip IR RTB x 15  Clam shell 2x15 YTB  SL hip abd 2x15 YTB  Figure 4 stretch 3x30 sec  Seated butterfly stretch 3x30 sec  SL bridge 2x15  SL TG squat 3x10 L7+1  2-way lunge slide 2x10    ASSESSMENT:  Continued anterior left hip tightness leading to decreased hip ext and ext rotation, tolerates manual and exercise well.    PLAN:  Continue with HEP, will have another dry needling session next week, follow up in 2 weeks as therapist will be out of town.  "

## 2024-07-29 ENCOUNTER — TREATMENT (OUTPATIENT)
Dept: PHYSICAL THERAPY | Facility: CLINIC | Age: 39
End: 2024-07-29
Payer: COMMERCIAL

## 2024-07-29 DIAGNOSIS — M25.852 FEMOROACETABULAR IMPINGEMENT OF LEFT HIP: ICD-10-CM

## 2024-07-29 PROCEDURE — 97110 THERAPEUTIC EXERCISES: CPT | Mod: GP

## 2024-07-29 PROCEDURE — 97140 MANUAL THERAPY 1/> REGIONS: CPT | Mod: GP

## 2024-07-29 NOTE — PROGRESS NOTES
"  Physical Therapy Treatment    Patient Name: Christine Bustos  MRN: [id number]  Today's Date: 7/29/2024  Visit: 33/MN    Diagnosis:   1. Femoroacetabular impingement of left hip  Follow Up In Physical Therapy      PRECAUTIONS:  Left hip arthroscopy 3/26/24    SUBJECTIVE:  Has had some improvement in flexibility post dry needling. Denies pain. Feels pretty good today.   Pt feel she is walking better.   HEP performed? Y    Pain: 0/10 anterior left hip     OBJECTIVE:        TREATMENT:  - Manual Therapy:  Hip mobilization in all directions, DTM anterior hip and proximal adductors x 15 min      - Therex:   Elliptical x 8 min L5  Kneeling hip flexor stretch 5x30  Standing hip flexor/quad stretch 30\"x3  Rock backs x 10    Prone glute ext 2x15  Bird dogs 2x15 ea  Prone hip ER RTB x 15  Prone hip IR RTB x 15  Clam shell 2x15 YTB  SL hip abd 2x15 YTB    Figure 4 stretch 3x30 sec  Seated butterfly stretch 3x30 sec    SL bridge 2x15  SL TG squat 3x10 L7+1  2-way lunge slide 2x10    ASSESSMENT:  Pt broderick well. Pt had groin line pain with exercise that she rated as mild. Pt with good broderick of strength exercise. Pt reports feeling the hip the most when walking, does not feel she can go fast.     PLAN:  Continue with HEP, resume with primary PT when he returns.   "

## 2024-08-01 ENCOUNTER — APPOINTMENT (OUTPATIENT)
Dept: OTOLARYNGOLOGY | Facility: CLINIC | Age: 39
End: 2024-08-01
Payer: COMMERCIAL

## 2024-08-01 VITALS — BODY MASS INDEX: 18.25 KG/M2 | WEIGHT: 120 LBS

## 2024-08-01 DIAGNOSIS — H61.22 CERUMEN DEBRIS ON TYMPANIC MEMBRANE, LEFT: Primary | ICD-10-CM

## 2024-08-01 DIAGNOSIS — H69.90 DYSFUNCTION OF EUSTACHIAN TUBE, UNSPECIFIED LATERALITY: ICD-10-CM

## 2024-08-01 PROCEDURE — 1036F TOBACCO NON-USER: CPT | Performed by: GENERAL PRACTICE

## 2024-08-01 PROCEDURE — 69210 REMOVE IMPACTED EAR WAX UNI: CPT | Performed by: GENERAL PRACTICE

## 2024-08-01 PROCEDURE — 99203 OFFICE O/P NEW LOW 30 MIN: CPT | Performed by: GENERAL PRACTICE

## 2024-08-01 RX ORDER — FLUTICASONE PROPIONATE 50 MCG
2 SPRAY, SUSPENSION (ML) NASAL DAILY
Qty: 16 G | Refills: 2 | Status: SHIPPED | OUTPATIENT
Start: 2024-08-01 | End: 2024-08-31

## 2024-08-01 NOTE — PROGRESS NOTES
Phase II Visit - Rehabilitation Services    Patient Name: Christine Bustos  MRN: [id number]  Today's Date: 8/2/2024  Time Calculation  Start Time: 0830  Stop Time: 0845  Time Calculation (min): 15 min      Visit number: 2      Current Problem:  Patient is being seen at [facility] for cash-based (phase II) visit for: dry needling         Subjective:  Patient reports she was sore after first needling session.  Her hips maybe felt a little better with walking but did not notice a huge difference. No pain entering clinic        Objective:  Pain 0/10     + hypertonicity L rectus, adductors, TFL, glute med and max     Limited ER and abduction with capsular/ firm end feel         Treatment Performed:    Instructed patient in at home joint mobilizations with band -reviewed with patient     DN: 0.3.x 50 mm  L rectus, psoas, TFL     Assessment:  Patient educated on dry needling precautions, indications and contraindications. Patient is aware of risks and benefits of procedure and provided informed consent. No adverse reaction to needling- pt does not like needles and was anxious during treatment but tolerated well (better than previous session). Pt educated to expect soreness for 24-36 hours, avoid NSAIDs and use heat as needed. Encouraged to drink water today as well to stay hydrated.         Plan:  Continue with dry needling for tissue restrictions PRN.             Glenys Maurer, PT

## 2024-08-01 NOTE — PROGRESS NOTES
Otolaryngology - Head and Neck Surgery Outpatient New Patient Visit Note        Assessment/Plan:   Problem List Items Addressed This Visit    None  Visit Diagnoses         Codes    Cerumen debris on tympanic membrane, left    -  Primary H61.22    Dysfunction of Eustachian tube, unspecified laterality     H69.90    Relevant Medications    fluticasone (Flonase) 50 mcg/actuation nasal spray    Other Relevant Orders    Referral to Audiology            Left ear fullness/muffled, with some cerumen on TM.  Debrided.  No otitis. Pt unsure if symptoms resolved.   Some history suggestive of ETD and pt with difficulty clearing ears with valsalva.    If fullness persists, will have pt use flonase, frequent gentle valsalva and acquire audiogram to aid in eval.           Follow up:  -plan for follow up in clinic as needed and after completion of ordered studies    All of the above findings, impressions, treatment planning and follow up plans were discussed with the patient who indicated understanding.  the patient was instructed to contact or return to clinic sooner if symptoms/signs persist or worsen despite the above management.      Francisco He MD  Otolaryngology - Head and Neck Surgery            History Of Present Illness  Christine Bustos is a 38 y.o. female presenting for left ear fullness.     Noted 1mo ago with pressure/fullness without inciting illness/trauma.   Seen by PCM and recommended debrox, but no change in symptoms.      No otalgia, otorrhea.     No significant priro history of recurrent otitis.   Some history of ETD with travel.    No right ear symptoms.             Past Medical History  She has a past medical history of Acute renal failure (CMS-HCC), Asthma ([facility]), Delayed emergence from general anesthesia, Endometriosis, Fissure, anal (2006), and PFO (patent foramen ovale) ([facility]).    Surgical History  She has a past surgical history that includes Other surgical history (01/20/2016); Other surgical history  (01/20/2016); Other surgical history (11/03/2017); Tonsillectomy; Adenoidectomy; Other surgical history; Other surgical history; and Tubal ligation.     Social History  She reports that she has never smoked. She has never used smokeless tobacco. She reports that she does not drink alcohol and does not use drugs.    Family History  Family History   Problem Relation Name Age of Onset    Heart disease Mother Radha     Cancer Paternal Grandfather Mike Bustos     Stroke Paternal Grandmother Jazmin Bustos     Alcohol abuse Brother Robert Bustos         Allergies  Ciprofloxacin, Flagyl [metronidazole], and Motrin [ibuprofen]    Review of Systems  ROS: Pertinent positives as noted in HPI.    - CONSTITUTIONAL: Does not report weight loss, fever or chills.    - HEENT:   Ear: Does not report tinnitus, vertigo,    , otalgia, otorrhea  Nose: Does not report congestion, rhinorrhea, epistaxis, decreased smell  Throat: Does not report pain, dysphagia, odynophagia  Larynx: Does not report hoarseness,  difficulty breathing, pain with speaking (odynophonia)  Neck: Does not report new masses, pain, swelling  Face: Does not report sinus pain, pressure, swelling, numbness, weakness     - RESPIRATORY: Does not report SOB or cough.    - CV: Does not report palpitations or chest pain.     - GI: Does not report abdominal pain, nausea, vomiting or diarrhea.    - : Does not report dysuria or urinary frequency.    - MSK: Does not report myalgia or joint pain.    - SKIN: Does not report rash or pruritus.    - NEUROLOGICAL: Does not report headache or syncope.    - PSYCHIATRIC: Does not report recent changes in mood. Does not report anxiety or depression.         Physical Exam:     GENERAL:   Alert & Oriented to person, place and time; Normal affect and appearance. Well developed and well nourished. Conversant & cooperative with examination.     HEAD:   Normocephalic, atraumatic. No sinus tenderness to palpation. Normal parotid bilaterally.  Normal facial strength.     NEUROLOGIC:   Cranial nerves II-XII grossly intact, gait WNL. Normal mood and affect.    EYES:   Extraocular movements intact. Pupils equal, round, reactive to light and accommodation. No nystagmus, no ptosis. no scleral injection.    EAR:   Normal auricle. No discomfort or TTP with manipulation.   Handheld otoscopic exam showed normal external auditory canals bilaterally. No purulence or EAC inflammation. Small cerumen on left TM, removed with suction.   Right tympanic membrane clear and poorly mobile without evidence of perforation, retraction or middle ear effusion.   Left tympanic membrane clear and poorly mobile without evidence of perforation, retraction or middle ear effusion.     NOSE:   No external deformity. No external nasal lesions, lacerations, or scars. Nasal tip symmetrical with normal nasal valves.   Nasal cavity with essentially midline septum, edematous  mucosa and turbinates. No lesions, masses, purulence or polyps.     OC/OP:   Mucous membranes moist, no masses, lesions or exudates.   Normal tongue, floor of mouth, teeth, gums, lips. Normal posterior pharyngeal wall.    Normal tonsils without erythema, exudate or obvious calculi     NECK:   No neck masses or thyroid enlargement. Trachea midline. No tenderness to palpation    LYMPHATIC:   No cervical lymphadenopathy.     RESPIRATORY:   Symmetric chest elevation & no retractions. No significant hoarseness. No increased work of breathing.    CV:   No clubbing or cyanosis. No obvious edema    Skin:   No facial rashes, vesicles or lesions.     Extremities:   No gross abnormalities      Clinic Procedure    Binocular microscopy exam  Indication: tympanic membrane(s) could not be visualized adequately with handheld otoscopy.   Location:  bilateral ears  Visualization Instrument: A microscope was used to visualize through a speculum placed in the ear canal(s) to visualize the ear canal, tympanic membranes and to assist in  assessment and removal of debris.  Findings:  see physical exam documentation  Patient Status: The patient tolerated the procedure well.   Complications: There were no complications.     Information review:  External sources (notes, imaging, lab results) listed below personally reviewed to aid in medical decision making process.  -  -  -

## 2024-08-02 ENCOUNTER — APPOINTMENT (OUTPATIENT)
Dept: PHYSICAL THERAPY | Facility: HOSPITAL | Age: 39
End: 2024-08-02

## 2024-08-02 ENCOUNTER — TREATMENT (OUTPATIENT)
Dept: PHYSICAL THERAPY | Facility: HOSPITAL | Age: 39
End: 2024-08-02

## 2024-08-02 DIAGNOSIS — M24.052 LOOSE BODY IN LEFT HIP: ICD-10-CM

## 2024-08-02 DIAGNOSIS — M25.552 LEFT HIP PAIN: ICD-10-CM

## 2024-08-02 DIAGNOSIS — M25.852 FEMOROACETABULAR IMPINGEMENT OF LEFT HIP: Primary | ICD-10-CM

## 2024-08-02 DIAGNOSIS — S73.192A TEAR OF LEFT ACETABULAR LABRUM, INITIAL ENCOUNTER: ICD-10-CM

## 2024-08-02 PROCEDURE — 4200000004 HC PT PHASE II 15 MIN CHG: Mod: GP

## 2024-08-08 ENCOUNTER — TREATMENT (OUTPATIENT)
Dept: PHYSICAL THERAPY | Facility: CLINIC | Age: 39
End: 2024-08-08
Payer: COMMERCIAL

## 2024-08-08 DIAGNOSIS — M25.852 FEMOROACETABULAR IMPINGEMENT OF LEFT HIP: ICD-10-CM

## 2024-08-08 PROCEDURE — 97140 MANUAL THERAPY 1/> REGIONS: CPT | Mod: GP

## 2024-08-08 PROCEDURE — 97110 THERAPEUTIC EXERCISES: CPT | Mod: GP

## 2024-08-08 NOTE — PROGRESS NOTES
"  Physical Therapy Treatment    Patient Name: Christine Bustos  MRN: [id number]  Today's Date: 8/8/2024  Visit: 34/MN    Diagnosis:   1. Femoroacetabular impingement of left hip  Follow Up In Physical Therapy      PRECAUTIONS:  Left hip arthroscopy 3/26/24    SUBJECTIVE:  Had another round of dry needling. Less intense this session, painful. Notices that walking is easier does not feel as tight.   HEP performed? Y    Pain: 0/10 anterior left hip     OBJECTIVE:        TREATMENT:  - Manual Therapy:  Hip mobilization in all directions, DTM anterior hip and proximal adductors x 15 min      - Therex:   Elliptical x 8 min L5  Kneeling hip flexor stretch 5x30  Standing hip flexor/quad stretch 30\"x3  Rock backs x 10    Prone glute ext 2x15  Bird dogs 2x15 ea  Prone hip ER RTB x 15  Prone hip IR RTB x 15  Clam shell 2x15 YTB  SL hip abd 2x15 YTB    Figure 4 stretch 3x30 sec  Seated butterfly stretch 3x30 sec    SL bridge 2x15  SL TG squat 3x10 L7+1  2-way lunge slide 2x10    ASSESSMENT:  Pt broderick well. Pt able to broderick STM of the anterior hip musculature well with reports of discomfort. Soft tissue restriction of the anterior hip noted. Positive response to care with improved ease of hip extension and ROT. Pt responded well to education on pain. Reviewed prolonged and resting positions of the leg to reduce tightness.       PLAN:  Continue with HEP and treatment to reduce pain.   "

## 2024-08-12 ENCOUNTER — TREATMENT (OUTPATIENT)
Dept: PHYSICAL THERAPY | Facility: CLINIC | Age: 39
End: 2024-08-12
Payer: COMMERCIAL

## 2024-08-12 DIAGNOSIS — M25.852 FEMOROACETABULAR IMPINGEMENT OF LEFT HIP: ICD-10-CM

## 2024-08-12 PROCEDURE — 97110 THERAPEUTIC EXERCISES: CPT | Mod: GP | Performed by: PHYSICAL THERAPIST

## 2024-08-12 PROCEDURE — 97140 MANUAL THERAPY 1/> REGIONS: CPT | Mod: GP | Performed by: PHYSICAL THERAPIST

## 2024-08-12 NOTE — PROGRESS NOTES
"  Physical Therapy Treatment    Patient Name: Christine Bustos  MRN: [id number]  Today's Date: 8/12/2024  Visit: 35/MN    Diagnosis:   1. Femoroacetabular impingement of left hip  Follow Up In Physical Therapy      PRECAUTIONS:  Left hip arthroscopy 3/26/24    SUBJECTIVE:  Patient reports soreness after last session, some improved ambulation noted with needling but no change in ROM, no c/o pain but tightness in anterior hip.  HEP performed? Y    Pain: 0/10 anterior left hip     OBJECTIVE:  Prone hip active IR 55 degrees, ER 35 degrees    TREATMENT:  Elliptical x 8' L5 warm-up  - Manual Therapy:  Hip mobilization in all directions, DTM anterior hip and proximal adductors x 20 min  - Therex:   Kneeling hip flexor stretch 5x30  Standing hip flexor/quad stretch 30\"x3  Rock backs x 10  Prone glute ext 2x15  Bird dogs 2x15 ea  Clam shell 2x15 RTB  SL hip abd 2x15 RTB  Figure 4 stretch 3x30 sec  Seated butterfly stretch 3x30 sec  SL bridge 2x15  SL TG squat 3x10 L7+1.5  2-way lunge slide 2x10    ASSESSMENT:  Patient does appear to be making slight improvement with hip ROM, continued anterior and lateral tightness noted but less guarded with gait and functional movements.    PLAN:  Continue with current routine as tolerated.  "

## 2024-08-15 ENCOUNTER — TREATMENT (OUTPATIENT)
Dept: PHYSICAL THERAPY | Facility: CLINIC | Age: 39
End: 2024-08-15
Payer: COMMERCIAL

## 2024-08-15 DIAGNOSIS — M25.852 FEMOROACETABULAR IMPINGEMENT OF LEFT HIP: ICD-10-CM

## 2024-08-15 PROCEDURE — 97140 MANUAL THERAPY 1/> REGIONS: CPT | Mod: GP

## 2024-08-15 PROCEDURE — 97110 THERAPEUTIC EXERCISES: CPT | Mod: GP

## 2024-08-15 NOTE — PROGRESS NOTES
"  Physical Therapy Treatment    Patient Name: Christine Bustos  MRN: [id number]  Today's Date: 8/15/2024  Visit: 36/MN    Diagnosis:   1. Femoroacetabular impingement of left hip  Follow Up In Physical Therapy      PRECAUTIONS:  Left hip arthroscopy 3/26/24    SUBJECTIVE:  Pt with fatigue today. Report she has pain when sitting in the car. The length of time in the car does not matter, it is always sore.     HEP performed? Y    Pain: 0/10 anterior left hip     OBJECTIVE:  Prone hip active IR 55 degrees, ER 35 degrees    TREATMENT:  Elliptical x 8' L5 warm-up    - Manual Therapy:  Hip mobilization in all directions, DTM anterior hip and proximal adductors x 20 min    - Therex:   Kneeling hip flexor stretch 5x30  Standing hip flexor/quad stretch 30\"x3  Rock backs x 10  Prone glute ext 2x15  Quad fire hydrant  Quad hip extension   Bird dogs 2x15 ea  Clam shell 2x15 RTB  SL hip abd 2x15 RTB  Figure 4 stretch 3x30 sec  Seated butterfly stretch 3x30 sec  SL bridge 2x15  SL TG squat 3x10 L7+1.5  Double limb squat TG x 30  2-way lunge slide 2x10  SLS dynamic activity     ASSESSMENT:  Pt challenged with current exercise program. Cued for increased reps and focus of technique. Quad and WB exercise completed adwoa for stabilization of left hip in SLS. Pt broderick well. No reports of pain. Did have reports of pulling in the left hip with abduction and extension ROM. To have dry needling tomorrow. Cued for frequency of stretching. Broderick well.        PLAN:  Continue with current routine as tolerated.  "

## 2024-08-16 ENCOUNTER — TREATMENT (OUTPATIENT)
Dept: PHYSICAL THERAPY | Facility: HOSPITAL | Age: 39
End: 2024-08-16

## 2024-08-16 DIAGNOSIS — M24.052 LOOSE BODY IN LEFT HIP: ICD-10-CM

## 2024-08-16 DIAGNOSIS — S73.192A TEAR OF LEFT ACETABULAR LABRUM, INITIAL ENCOUNTER: ICD-10-CM

## 2024-08-16 DIAGNOSIS — M25.552 LEFT HIP PAIN: ICD-10-CM

## 2024-08-16 DIAGNOSIS — M25.852 FEMOROACETABULAR IMPINGEMENT OF LEFT HIP: Primary | ICD-10-CM

## 2024-08-16 PROCEDURE — 4200000004 HC PT PHASE II 15 MIN CHG: Mod: GP

## 2024-08-16 NOTE — PROGRESS NOTES
Phase II Visit - Rehabilitation Services    Patient Name: Christine Bustos  MRN: [id number]  Today's Date: 8/16/2024  Time Calculation  Start Time: 0800  Stop Time: 0815  Time Calculation (min): 15 min      Visit number: 3      Current Problem:  Patient is being seen at [facility] for cash-based (phase II) visit for: dry needling         Subjective:  Patient reports she was not as sore after last session. She has noticed an improvement in her symptoms with walking since starting needling. She continues to have pain with sitting in her car particularly.         Objective:  Pain 0/10     + hypertonicity L adductors, iliacus and TFL     Limited ER and abduction with capsular/ firm end feel         Treatment Performed:      DN: 0.3.x 50 mm  L TFL and iliacus     Assessment:  Patient responded well to needling. Pt has firm/capsular end feel with PROM hip abduction and ER. Encouraged patient to perform mobility exercises to improve these (lateral lunge, straddle). No adverse reactions to needling.         Plan:  Continue with dry needling for tissue restrictions PRN.             Glenys Maurer, PT

## 2024-08-19 ENCOUNTER — TREATMENT (OUTPATIENT)
Dept: PHYSICAL THERAPY | Facility: CLINIC | Age: 39
End: 2024-08-19
Payer: COMMERCIAL

## 2024-08-19 DIAGNOSIS — M25.852 FEMOROACETABULAR IMPINGEMENT OF LEFT HIP: ICD-10-CM

## 2024-08-19 DIAGNOSIS — M25.552 LEFT HIP PAIN: Primary | ICD-10-CM

## 2024-08-19 PROCEDURE — 97140 MANUAL THERAPY 1/> REGIONS: CPT | Mod: GP | Performed by: PHYSICAL THERAPIST

## 2024-08-19 PROCEDURE — 97110 THERAPEUTIC EXERCISES: CPT | Mod: GP | Performed by: PHYSICAL THERAPIST

## 2024-08-19 NOTE — PROGRESS NOTES
"  Physical Therapy Treatment    Patient Name: Christine Bustos  MRN: [id number]  Today's Date: 8/19/2024  Visit: 37/MN    Diagnosis:   1. Left hip pain        2. Femoroacetabular impingement of left hip        PRECAUTIONS:  Left hip arthroscopy 3/26/24    SUBJECTIVE:  Pt reports she had another dry needling session last week, will continue with needling every other week, no significant improvement in symptoms, she is trying more exercises in the gym but still hasn't returned to abdominal exercises yet.    HEP performed? Y    Pain: 0/10 anterior left hip     OBJECTIVE:  Prone hip active IR 55 degrees, ER 35 degrees    TREATMENT:  Elliptical x 8' L5 warm-up  - Manual Therapy:  Hip mobilization in all directions, DTM anterior hip and proximal adductors x 20 min  - Therex:   Kneeling hip flexor stretch 5x30  Standing hip flexor/quad stretch 30\"x3  Rock backs x 10  Prone glute ext 2x15  Quad fire hydrant  Quad hip extension   Bird dogs 2x15 ea  Clam shell 2x15 RTB  SL hip abd 2x15 RTB  Figure 4 stretch 3x30 sec  Seated butterfly stretch 3x30 sec  SL bridge 2x15  SL TG squat 2x10 L7  Double limb squat TG x 30 L7+2  2-way lunge slide x 10 ea  SLS dynamic activity     ASSESSMENT:  Improving hip ER noted, tolerates manual well, will look to progress with strengthening.    PLAN:  Continue with current routine, add back in abdominal strengthening at gym as tolerated.  "

## 2024-08-23 ENCOUNTER — TREATMENT (OUTPATIENT)
Dept: PHYSICAL THERAPY | Facility: CLINIC | Age: 39
End: 2024-08-23
Payer: COMMERCIAL

## 2024-08-23 DIAGNOSIS — M25.852 FEMOROACETABULAR IMPINGEMENT OF LEFT HIP: ICD-10-CM

## 2024-08-23 PROCEDURE — 97110 THERAPEUTIC EXERCISES: CPT | Mod: GP | Performed by: PHYSICAL THERAPIST

## 2024-08-23 PROCEDURE — 97140 MANUAL THERAPY 1/> REGIONS: CPT | Mod: GP | Performed by: PHYSICAL THERAPIST

## 2024-08-23 NOTE — PROGRESS NOTES
"  Physical Therapy Treatment    Patient Name: Christine Bustos  MRN: [id number]  Today's Date: 8/26/2024  Visit: 38/MN    Diagnosis:   1. Femoroacetabular impingement of left hip  Follow Up In Physical Therapy      PRECAUTIONS:  Left hip arthroscopy 3/26/24    SUBJECTIVE:  Pt reports some increased anterior hip soreness/tightness, may have been from some TRX abdominal exercises at the gym.  HEP performed? Y  Pain: 0/10 anterior left hip     OBJECTIVE:  Prone hip active IR 55 degrees, ER 35 degrees    TREATMENT:  Elliptical x 8' L5 warm-up  - Manual Therapy:  Hip mobilization in all directions, DTM anterior hip and proximal adductors x 20 min  - Therex:   Kneeling hip flexor stretch 5x30  Standing hip flexor/quad stretch 30\"x3  Rock backs x 10  Prone glute ext 2x15  Quad fire hydrant  Quad hip extension   Bird dogs 2x15 ea  Clam shell 2x15 RTB  SL hip abd 2x15 RTB  Figure 4 stretch 3x30 sec  Seated butterfly stretch 3x30 sec  SL bridge 2x15  SL TG squat 3x10 L7  Double limb squat TG x 30 L7+3  2-way lunge slide x 10 ea    ASSESSMENT:  Improving hip ER noted, tolerates manual well, will look to progress with strengthening.    PLAN:  Continue with current routine, add back in abdominal strengthening at gym as tolerated.  "

## 2024-08-26 ENCOUNTER — TREATMENT (OUTPATIENT)
Dept: PHYSICAL THERAPY | Facility: CLINIC | Age: 39
End: 2024-08-26
Payer: COMMERCIAL

## 2024-08-26 DIAGNOSIS — M25.852 FEMOROACETABULAR IMPINGEMENT OF LEFT HIP: ICD-10-CM

## 2024-08-26 PROCEDURE — 97110 THERAPEUTIC EXERCISES: CPT | Mod: GP | Performed by: PHYSICAL THERAPIST

## 2024-08-26 PROCEDURE — 97140 MANUAL THERAPY 1/> REGIONS: CPT | Mod: GP | Performed by: PHYSICAL THERAPIST

## 2024-08-26 NOTE — PROGRESS NOTES
"  Physical Therapy Treatment    Patient Name: Christine Bustos  MRN: [id number]  Today's Date: 8/26/2024  Visit: 39/MN    Diagnosis:   1. Femoroacetabular impingement of left hip  Follow Up In Physical Therapy      PRECAUTIONS:  Left hip arthroscopy 3/26/24    SUBJECTIVE:  Pt reports she went kayaking this past weekend and the hip tolerated well during the 2 hour trip, tight after the activity, exercises going well.  HEP performed? Y  Pain: 0/10 anterior left hip     OBJECTIVE:  Passive hip extension 5 degrees    TREATMENT:  Elliptical x 8' L5 warm-up  - Manual Therapy:  Hip mobilization in all directions, DTM anterior hip and proximal adductors x 20 min  - Therex:   Kneeling hip flexor stretch 5x30  Standing hip flexor/quad stretch 30\"x3  Rock backs x 10  Prone glute ext 2x15  Quad fire hydrant  Quad hip extension   Bird dogs 2x15 ea  Clam shell 2x15 RTB  SL hip abd 2x15 RTB  Figure 4 stretch 3x30 sec  Seated butterfly stretch 3x30 sec  SL bridge 2x15  SL TG squat 3x10 L7  2-way lunge slide x 12 ea    ASSESSMENT:  Patient to add back in some abdominal work and see if it tightens up anterior hip, some pinching noted with lunge slides.    PLAN:  Continue with current routine, add back in abdominal strengthening at gym as tolerated.  "

## 2024-08-29 ENCOUNTER — TREATMENT (OUTPATIENT)
Dept: PHYSICAL THERAPY | Facility: HOSPITAL | Age: 39
End: 2024-08-29

## 2024-08-29 DIAGNOSIS — S73.192A TEAR OF LEFT ACETABULAR LABRUM, INITIAL ENCOUNTER: ICD-10-CM

## 2024-08-29 DIAGNOSIS — M25.552 LEFT HIP PAIN: ICD-10-CM

## 2024-08-29 DIAGNOSIS — M25.852 FEMOROACETABULAR IMPINGEMENT OF LEFT HIP: Primary | ICD-10-CM

## 2024-08-29 DIAGNOSIS — M24.052 LOOSE BODY IN LEFT HIP: ICD-10-CM

## 2024-08-29 PROCEDURE — 4200000004 HC PT PHASE II 15 MIN CHG: Mod: GP

## 2024-08-29 NOTE — PROGRESS NOTES
Phase II Visit - Rehabilitation Services    Patient Name: Christine Bustos  MRN: [id number]  Today's Date: 8/29/2024  Time Calculation  Start Time: 1320  Stop Time: 1335  Time Calculation (min): 15 min      Visit number: 4      Current Problem:  Patient is being seen at [facility] for cash-based (phase II) visit for: dry needling         Subjective:  Patient reports she did not notice much relief from the iliacus needling. Continuing to feel some improvement overall.         Objective:  Pain 0/10     + hypertonicity L adductors, iliacus and TFL     Limited ER and abduction with capsular/ firm end feel -ER improved from previous sessions.         Treatment Performed:  DN: 0.3.x 50 mm L psoas, adductors     Assessment:  Patient responded well to needling, immediate release of palpable tension noted in adductors. Patient continues to have firm end feel with abduction- instructed patient in lateral lunge mobility exercise to perform bilaterally to help improve motion.         Plan:  Continue with dry needling for tissue restrictions PRN.             Glenys Maurer, PT

## 2024-08-30 ENCOUNTER — TREATMENT (OUTPATIENT)
Dept: PHYSICAL THERAPY | Facility: CLINIC | Age: 39
End: 2024-08-30
Payer: COMMERCIAL

## 2024-08-30 ENCOUNTER — APPOINTMENT (OUTPATIENT)
Dept: ORTHOPEDIC SURGERY | Facility: HOSPITAL | Age: 39
End: 2024-08-30
Payer: COMMERCIAL

## 2024-08-30 DIAGNOSIS — M25.852 FEMOROACETABULAR IMPINGEMENT OF LEFT HIP: ICD-10-CM

## 2024-08-30 PROCEDURE — 97110 THERAPEUTIC EXERCISES: CPT | Mod: GP | Performed by: PHYSICAL THERAPIST

## 2024-08-30 PROCEDURE — 97140 MANUAL THERAPY 1/> REGIONS: CPT | Mod: GP | Performed by: PHYSICAL THERAPIST

## 2024-08-30 NOTE — PROGRESS NOTES
"  Physical Therapy Treatment    Patient Name: Christine Bustos  MRN: [id number]  Today's Date: 8/30/2024  Visit: 40/MN    Diagnosis:   1. Femoroacetabular impingement of left hip  Follow Up In Physical Therapy      PRECAUTIONS:  Left hip arthroscopy 3/26/24    SUBJECTIVE:  Pt reports she had dry needling yesterday, painful as she did adductors, usually takes several days to determine benefit, did do abs in gym without increased hip soreness/tightness.  HEP performed? Y  Pain: 0/10 anterior left hip     OBJECTIVE:  +TTP left hip proximal adductors and proximal rectus femoris    TREATMENT:  Elliptical x 8' L5 warm-up  - Manual Therapy:  Hip mobilization in all directions, DTM anterior hip and proximal adductors x 20 min  - Therex:   Kneeling hip flexor stretch 5x30  Standing hip flexor/quad stretch 30\"x3  Rock backs x 10  Prone glute ext 2x15  Quad fire hydrant  Quad hip extension   Bird dogs 2x15 ea  Clam shell 2x15 RTB  SL hip abd 2x15 RTB  Figure 4 stretch 3x30 sec  Seated butterfly stretch 3x30 sec  SL bridge 2x15  SL TG squat 3x10 L7  2-way lunge slide 2x10 ea    ASSESSMENT:  Hip ext and ER continue to gradually improve, still very limited into abduction, tolerating strength exercises well.    PLAN:  Continue with current routine as tolerated.  "

## 2024-09-03 ENCOUNTER — TREATMENT (OUTPATIENT)
Dept: PHYSICAL THERAPY | Facility: CLINIC | Age: 39
End: 2024-09-03
Payer: COMMERCIAL

## 2024-09-03 DIAGNOSIS — M25.852 FEMOROACETABULAR IMPINGEMENT OF LEFT HIP: Primary | ICD-10-CM

## 2024-09-03 PROCEDURE — 97140 MANUAL THERAPY 1/> REGIONS: CPT | Mod: GP,CQ | Performed by: PHYSICAL THERAPY ASSISTANT

## 2024-09-03 PROCEDURE — 97110 THERAPEUTIC EXERCISES: CPT | Mod: GP,CQ | Performed by: PHYSICAL THERAPY ASSISTANT

## 2024-09-03 NOTE — PROGRESS NOTES
"  Physical Therapy Treatment    Patient Name: Christine Bustos  MRN: [id number]  Today's Date: 9/3/2024  Visit: 41/MN    Diagnosis:   1. Femoroacetabular impingement of left hip          PRECAUTIONS:  Left hip arthroscopy 3/26/24    SUBJECTIVE:  Pt reports she had dry needling yesterday, painful as she did adductors, usually takes several days to determine benefit, did do abs in gym without increased hip soreness/tightness.  HEP performed? Y  Pain: 0/10 anterior left hip     OBJECTIVE:  +TTP left hip proximal adductors and proximal rectus femoris  Improved trunk control while performing bird dogs and fire hydrants.   Cont hip capsule tightness especially with flexion and ER.     TREATMENT:  Elliptical x 8' L5 warm-up  - Manual Therapy:  Hip mobilization in all directions, DTM anterior hip and proximal adductors x 20 min  - Therex:   Kneeling hip flexor stretch 5x30  Standing hip flexor/quad stretch 30\"x3  Rock backs x 10  Prone glute ext 3x10  Quad fire hydrant  Bird dogs 3x10 ea  Clam shell 3x10 RTB  SL hip abd 3x10 RTB  Figure 4 stretch 3x30 sec  Seated butterfly stretch 3x30 sec  SL bridge 3x10  SL TG squat 3x10 L7  2-way lunge slide 2x10 ea    ASSESSMENT:  Adapting well to exercise stress. Performed exercises slowly and cautiously as broderick. She cont to have concerns long term and hopes to get back to her \"normal\" within a reasonable amount of time.     PLAN:  Continue with current routine as tolerated.  "

## 2024-09-06 ENCOUNTER — TREATMENT (OUTPATIENT)
Dept: PHYSICAL THERAPY | Facility: CLINIC | Age: 39
End: 2024-09-06
Payer: COMMERCIAL

## 2024-09-06 DIAGNOSIS — M25.852 FEMOROACETABULAR IMPINGEMENT OF LEFT HIP: ICD-10-CM

## 2024-09-06 PROCEDURE — 97110 THERAPEUTIC EXERCISES: CPT | Mod: GP

## 2024-09-06 PROCEDURE — 97140 MANUAL THERAPY 1/> REGIONS: CPT | Mod: GP

## 2024-09-06 NOTE — PROGRESS NOTES
"Physical Therapy Treatment    Patient Name: Christine Bustos  MRN: [id number]    Today's Date: 9/6/2024    Insurance:  Visit number 42 /MN  Insurance Type: Payor: DOUG / Plan: ANTHEM HMP / Product Type: *No Product type* /   Authorization or Plan of Care date Range: DATES       Diagnosis:   1. Femoroacetabular impingement of left hip  Follow Up In Physical Therapy          Precautions:  Left hip arthroscopy 3/26/24  Fall Risk: Low    SUBJECTIVE:  Pt reports no pain at start of session. No c/o after last session.   Pain level: 0/10  Compliant with HEP? y    OBJECTIVE:  +TTP left hip proximal adductors and proximal rectus femoris  Cont hip capsule tightness especially with flexion and ER.     TREATMENT:  Elliptical x 8' L5 warm-up    - Manual Therapy:  Hip mobilization in all directions, DTM anterior hip and proximal adductors x 20 min    - Therex:   Kneeling hip flexor stretch 5x30  Standing hip flexor/quad stretch 30\"x3  Rock backs x 10  Prone glute ext 3x10  Quad fire hydrant  Bird dogs 3x10 ea  Clam shell 3x10 RTB  SL hip abd 3x10 RTB  Figure 4 stretch 3x30 sec  Seated butterfly stretch 3x30 sec  SL bridge leg extension  3x10  SL TG squat 3x10 L7  2-way lunge slide 2x10 ea BLE no UE assist.  L SLS star 12 o'oclock to 6 o'clock  x10      ASSESSMENT:  Pt tolerated session progression well and required prompts for proper performance of skilled PT exercises.  Pt tolerated exercises progression and is challenged by dynamic single leg balance. Pt continues to be limited in L hip strength, ROM and balance and flexibility. Tenderness continues in anterior and medial thigh.      PLAN:    Daily HEP, ice prn  Progress flexibility, strength and balance as tolerated.     Billing:     PT Therapeutic Procedures Time Entry  Manual Therapy Time Entry: 20  Therapeutic Exercise Time Entry: 40                 "

## 2024-09-09 ENCOUNTER — TREATMENT (OUTPATIENT)
Dept: PHYSICAL THERAPY | Facility: CLINIC | Age: 39
End: 2024-09-09
Payer: COMMERCIAL

## 2024-09-09 DIAGNOSIS — M25.852 FEMOROACETABULAR IMPINGEMENT OF LEFT HIP: ICD-10-CM

## 2024-09-09 PROCEDURE — 97110 THERAPEUTIC EXERCISES: CPT | Mod: GP | Performed by: PHYSICAL THERAPIST

## 2024-09-09 PROCEDURE — 97140 MANUAL THERAPY 1/> REGIONS: CPT | Mod: GP | Performed by: PHYSICAL THERAPIST

## 2024-09-09 NOTE — PROGRESS NOTES
"  Physical Therapy Treatment    Patient Name: Christine Bustos  MRN: [id number]  Today's Date: 9/9/2024  Visit: 43/MN    Diagnosis:   1. Femoroacetabular impingement of left hip  Follow Up In Physical Therapy        PRECAUTIONS:  Left hip arthroscopy 3/26/24    SUBJECTIVE:  Pt reports she has noticed slight gradual improvement recently, continues to try new things in the gym but holds if she experiences left hip pain or tightness.  HEP performed? Y  Pain: 0/10 anterior left hip     OBJECTIVE:  +TTP left hip proximal adductors and proximal rectus femoris    TREATMENT:  Elliptical x 8' L5 warm-up  - Manual Therapy:  Hip mobilization in all directions, DTM anterior hip and proximal adductors x 20 min  - Therex:   Kneeling hip flexor stretch 5x30  Standing hip flexor/quad stretch 30\"x3  Rock backs x 10  Prone glute ext 3x10  Quad fire hydrant  Bird dogs 3x10 ea  Clam shell 3x10 RTB  SL hip abd 3x10 RTB  Figure 4 stretch 3x30 sec  Seated butterfly stretch 3x30 sec  SL bridge 3x10  SL TG squat 3x10 L7  2-way lunge slide 2x10 ea    ASSESSMENT:  Noticing improved hip ext and abd with less soft tissue/capsular tightness, will continue pushing ROM as she gradually return to more gym exercises.    PLAN:  Continue with current routine as tolerated.  "

## 2024-09-12 ENCOUNTER — TREATMENT (OUTPATIENT)
Dept: PHYSICAL THERAPY | Facility: CLINIC | Age: 39
End: 2024-09-12
Payer: COMMERCIAL

## 2024-09-12 DIAGNOSIS — M25.852 FEMOROACETABULAR IMPINGEMENT OF LEFT HIP: ICD-10-CM

## 2024-09-12 PROCEDURE — 97140 MANUAL THERAPY 1/> REGIONS: CPT | Mod: GP | Performed by: PHYSICAL THERAPIST

## 2024-09-12 PROCEDURE — 97110 THERAPEUTIC EXERCISES: CPT | Mod: GP | Performed by: PHYSICAL THERAPIST

## 2024-09-12 NOTE — PROGRESS NOTES
"  Physical Therapy Treatment    Patient Name: Christine Bustos  MRN: [id number]  Today's Date: 9/12/2024  Visit: 44/MN    Diagnosis:   1. Femoroacetabular impingement of left hip  Follow Up In Physical Therapy        PRECAUTIONS:  Left hip arthroscopy 3/26/24    SUBJECTIVE:  Pt reports she continues to try new exercises in the gym and stops if she notices soreness, some continued anterior hip soreness with prolonged sitting or occasionally with walking, no functional limitations.  HEP performed? Y  Pain: 0/10 anterior left hip     OBJECTIVE:  Left hip strength: hip flexion 4-/5, abd 4-/5, ER 4-/5    TREATMENT:  Elliptical x 8' L5 warm-up  - Manual Therapy:  Hip mobilization in all directions, DTM anterior hip and proximal adductors x 20 min  - Therex:   Kneeling hip flexor stretch 5x30  Standing hip flexor/quad stretch 30\"x3  Rock backs x 10  Prone glute ext 3x10  Quad fire hydrant  Bird dogs 3x10 ea  Clam shell 3x10 RTB  SL hip abd 3x10 RTB  Figure 4 stretch 3x30 sec  Seated butterfly stretch 3x30 sec  SL bridge 3x10  SL TG squat 3x10 L7+1  2-way lunge slide 2x10 ea  Total hip abd 3x10 L3    ASSESSMENT:  Less adductor tightness noted with improved hip abduction, left hip remains weak but expected as we've focused on hip ROM.    PLAN:  Continue with current routine as tolerated, progress with exercises and activity as tolerated.  " Provider Procedure Text (A): After obtaining clear surgical margins the defect was repaired by another provider.

## 2024-09-13 ENCOUNTER — TREATMENT (OUTPATIENT)
Dept: PHYSICAL THERAPY | Facility: HOSPITAL | Age: 39
End: 2024-09-13

## 2024-09-13 DIAGNOSIS — M24.052 LOOSE BODY IN LEFT HIP: ICD-10-CM

## 2024-09-13 DIAGNOSIS — M25.552 LEFT HIP PAIN: ICD-10-CM

## 2024-09-13 DIAGNOSIS — M25.852 FEMOROACETABULAR IMPINGEMENT OF LEFT HIP: Primary | ICD-10-CM

## 2024-09-13 DIAGNOSIS — S73.192A TEAR OF LEFT ACETABULAR LABRUM, INITIAL ENCOUNTER: ICD-10-CM

## 2024-09-13 PROCEDURE — 4200000004 HC PT PHASE II 15 MIN CHG: Mod: GP

## 2024-09-13 NOTE — PROGRESS NOTES
Phase II Visit - Rehabilitation Services    Patient Name: Christine Bustos  MRN: [id number]  Today's Date: 9/13/2024  Time Calculation  Start Time: 1010  Stop Time: 1030  Time Calculation (min): 20 min      Visit number: 5      Current Problem:  Patient is being seen at [facility] for cash-based (phase II) visit for: dry needling         Subjective:  Patient reports she thinks she is doing better overall. She did a cartwheel at [facility] yesterday and immediately regret it as this caused her hip to hurt. Follow up with Janel in October.         Objective:  Pain 0/10     + hypertonicity L adductors, glute max     Improved tone in hip flexors         Treatment Performed:  DN: 0.3.x 50 mm L adductors and glute max     Assessment:  Patient responded well to needling, immediate release of palpable tension noted in adductors. Performed bending of adductors to release tension as well. Pt to continue with foam rolling and mobility work on her own.         Plan:  Continue with dry needling for tissue restrictions PRN.             Glenys Maurer, PT

## 2024-09-16 ENCOUNTER — TREATMENT (OUTPATIENT)
Dept: PHYSICAL THERAPY | Facility: CLINIC | Age: 39
End: 2024-09-16
Payer: COMMERCIAL

## 2024-09-16 DIAGNOSIS — M25.852 FEMOROACETABULAR IMPINGEMENT OF LEFT HIP: ICD-10-CM

## 2024-09-16 PROCEDURE — 97110 THERAPEUTIC EXERCISES: CPT | Mod: GP | Performed by: PHYSICAL THERAPIST

## 2024-09-16 NOTE — PROGRESS NOTES
"  Physical Therapy Treatment    Patient Name: Christine Bustos  MRN: [id number]  Today's Date: 9/16/2024  Visit: 45/MN    Diagnosis:   1. Femoroacetabular impingement of left hip  Follow Up In Physical Therapy      PRECAUTIONS:  Left hip arthroscopy 3/26/24    SUBJECTIVE:  Pt reports no significant change in the hip, gym exercises going well but certain exercises cause soreness and she avoids, does notice improved hip abduction.  HEP performed? Y  Pain: 0/10 anterior left hip     OBJECTIVE:  Left hip strength: hip flexion 4-/5, abd 4-/5, ER 4-/5    TREATMENT:  Elliptical x 8' L5 warm-up  - Manual Therapy:  Hip mobilization in all directions, DTM anterior hip and proximal adductors x 20 min  - Therex:   Kneeling hip flexor stretch 5x30  Standing hip flexor/quad stretch 30\"x3  Rock backs x 10  Prone glute ext 3x10  Quad fire hydrant  Bird dogs 3x10 ea  Clam shell 3x10 RTB  SL hip abd 3x10 RTB  Figure 4 stretch 3x30 sec  Seated butterfly stretch 3x30 sec  SL bridge 3x10  SL TG squat 3x10 L7+1  2-way lunge slide 2x10 ea  Total hip abd 3x10 L3    ASSESSMENT:  Plan to continue aggressive stretching and joint mobilization prior to ortho appointment in 3 weeks, less tone noted in adductors, continued anterior hip tightness limiting ext and er.    PLAN:  Continue with current routine as tolerated, progress with exercises and activity as tolerated.  "

## 2024-09-20 ENCOUNTER — TREATMENT (OUTPATIENT)
Dept: PHYSICAL THERAPY | Facility: CLINIC | Age: 39
End: 2024-09-20
Payer: COMMERCIAL

## 2024-09-20 DIAGNOSIS — M25.852 FEMOROACETABULAR IMPINGEMENT OF LEFT HIP: ICD-10-CM

## 2024-09-20 PROCEDURE — 97110 THERAPEUTIC EXERCISES: CPT | Mod: GP | Performed by: PHYSICAL THERAPIST

## 2024-09-20 PROCEDURE — 97140 MANUAL THERAPY 1/> REGIONS: CPT | Mod: GP | Performed by: PHYSICAL THERAPIST

## 2024-09-23 ENCOUNTER — TREATMENT (OUTPATIENT)
Dept: PHYSICAL THERAPY | Facility: CLINIC | Age: 39
End: 2024-09-23
Payer: COMMERCIAL

## 2024-09-23 DIAGNOSIS — M25.852 FEMOROACETABULAR IMPINGEMENT OF LEFT HIP: ICD-10-CM

## 2024-09-23 PROCEDURE — 97110 THERAPEUTIC EXERCISES: CPT | Mod: GP | Performed by: PHYSICAL THERAPIST

## 2024-09-23 PROCEDURE — 97140 MANUAL THERAPY 1/> REGIONS: CPT | Mod: GP | Performed by: PHYSICAL THERAPIST

## 2024-09-23 NOTE — PROGRESS NOTES
"  Physical Therapy Treatment    Patient Name: Christine Bustos  MRN: [id number]  Today's Date: 9/23/2024  Visit: 47/MN    Diagnosis:   1. Femoroacetabular impingement of left hip  Follow Up In Physical Therapy      PRECAUTIONS:  Left hip arthroscopy 3/26/24    SUBJECTIVE:  Pt reports no significant change in hip symptoms, continues to try new exercises in gym setting.  HEP performed? Y  Pain: 0/10 anterior left hip     OBJECTIVE:  Left hip strength: hip flexion 4-/5, abd 4-/5, ER 4-/5  +TTP anterior left hip  TREATMENT:  Elliptical x 8' L5 warm-up  - Manual Therapy:  Hip mobilization in all directions, DTM anterior hip and proximal adductors x 20 min  - Therex:   Kneeling hip flexor stretch 5x30  Standing hip flexor/quad stretch 30\"x3  Rock backs x 10  Prone glute ext 3x10  Quad fire hydrant  Bird dogs 3x10 ea  Clam shell 3x10 GTB  SL hip abd 3x10 GTB  Figure 4 stretch 3x30 sec  Seated butterfly stretch 3x30 sec  SL bridge 3x10  SL TG squat 3x10 L7+1  2-way lunge slide 2x10 ea  Total hip abd 3x10 L3    ASSESSMENT:  Patient with good tolerance to manual and strength exercises, dry needling appears to be helping hip abd, unclear with hip extension and ER, will continue with therapy until follow up with ortho to discuss injection.    PLAN:  Continue with current routine as tolerated until followup with ortho on 10/9/24.  "

## 2024-09-27 ENCOUNTER — APPOINTMENT (OUTPATIENT)
Dept: PHYSICAL THERAPY | Facility: HOSPITAL | Age: 39
End: 2024-09-27

## 2024-09-27 ENCOUNTER — TREATMENT (OUTPATIENT)
Dept: PHYSICAL THERAPY | Facility: CLINIC | Age: 39
End: 2024-09-27
Payer: COMMERCIAL

## 2024-09-27 DIAGNOSIS — S73.192A TEAR OF LEFT ACETABULAR LABRUM, INITIAL ENCOUNTER: ICD-10-CM

## 2024-09-27 DIAGNOSIS — M25.852 FEMOROACETABULAR IMPINGEMENT OF LEFT HIP: ICD-10-CM

## 2024-09-27 DIAGNOSIS — M24.052 LOOSE BODY IN LEFT HIP: ICD-10-CM

## 2024-09-27 DIAGNOSIS — M25.852 FEMOROACETABULAR IMPINGEMENT OF LEFT HIP: Primary | ICD-10-CM

## 2024-09-27 DIAGNOSIS — M25.552 LEFT HIP PAIN: ICD-10-CM

## 2024-09-27 PROCEDURE — 97110 THERAPEUTIC EXERCISES: CPT | Mod: GP | Performed by: PHYSICAL THERAPIST

## 2024-09-27 PROCEDURE — 4200000004 HC PT PHASE II 15 MIN CHG: Mod: GP

## 2024-09-27 PROCEDURE — 97140 MANUAL THERAPY 1/> REGIONS: CPT | Mod: GP | Performed by: PHYSICAL THERAPIST

## 2024-09-27 ASSESSMENT — PAIN - FUNCTIONAL ASSESSMENT: PAIN_FUNCTIONAL_ASSESSMENT: 0-10

## 2024-09-27 NOTE — PROGRESS NOTES
"  Physical Therapy Treatment    Patient Name: Christine Bustos  MRN: [id number]  Today's Date: 9/27/2024  Visit: 48/MN    Diagnosis:   1. Femoroacetabular impingement of left hip  Follow Up In Physical Therapy      PRECAUTIONS:  Left hip arthroscopy 3/26/24    SUBJECTIVE:  Pt reports no significant change in hip symptoms, dry needling later today, sees ortho to discuss further intervention 10/9.  HEP performed? Y  Pain: 0/10 anterior left hip     OBJECTIVE:  Left hip strength: hip flexion 4-/5, abd 4-/5, ER 4-/5  +TTP anterior left hip  TREATMENT:  Elliptical x 8' L5 warm-up  - Manual Therapy:  Hip mobilization in all directions, DTM anterior hip and proximal adductors x 20 min  - Therex:   Kneeling hip flexor stretch 5x30  Standing hip flexor/quad stretch 30\"x3  Rock backs x 10  Prone glute ext 3x10  Quad fire hydrant  Bird dogs 3x10 ea  Clam shell 3x10 GTB  SL hip abd 3x10 GTB  Figure 4 stretch 3x30 sec  Seated butterfly stretch 3x30 sec  SL bridge 3x10  SL TG squat 3x10 L7+1  2-way lunge slide 2x10 ea  Total hip abd 3x10 L3    ASSESSMENT:  Patient continues to perform all exercises with minimal difficulty, hip ROM still limited, specifically ext and ER, much improved abduction.    PLAN:  Continue with current routine as tolerated until followup with ortho on 10/9/24.  "

## 2024-09-27 NOTE — PROGRESS NOTES
Phase II Visit - Rehabilitation Services    Patient Name: Christine Bustos  MRN: [id number]  Today's Date: 9/27/2024  Time Calculation  Start Time: 1115  Stop Time: 1130  Time Calculation (min): 15 min      Visit number: 6        Current Problem:  Patient is being seen at [facility] for cash-based (phase II) visit for: dry needling         Subjective:  Patient reports she is gradually seeing improvements with her hip but is still limited.         Objective:  Pain 0/10     + hypertonicity L adductors, TFL     Improved tone in hip flexors         Treatment Performed:  DN: 0.3.x 50 mm L adductors and TFL     Assessment:  Patient responded well to needling, immediate release of palpable tension noted in adductors. Performed bending of adductors to release tension as well.       Plan:  Continue with dry needling for tissue restrictions PRN.             Glenys Maurer, PT

## 2024-09-30 ENCOUNTER — TREATMENT (OUTPATIENT)
Dept: PHYSICAL THERAPY | Facility: CLINIC | Age: 39
End: 2024-09-30
Payer: COMMERCIAL

## 2024-09-30 DIAGNOSIS — M25.852 FEMOROACETABULAR IMPINGEMENT OF LEFT HIP: ICD-10-CM

## 2024-09-30 PROCEDURE — 97110 THERAPEUTIC EXERCISES: CPT | Mod: GP | Performed by: PHYSICAL THERAPIST

## 2024-09-30 PROCEDURE — 97140 MANUAL THERAPY 1/> REGIONS: CPT | Mod: GP | Performed by: PHYSICAL THERAPIST

## 2024-09-30 NOTE — PROGRESS NOTES
"  Physical Therapy Treatment    Patient Name: Christine Bustos  MRN: [id number]  Today's Date: 9/30/2024  Visit: 49/MN    Diagnosis:   1. Femoroacetabular impingement of left hip  Follow Up In Physical Therapy      PRECAUTIONS:  Left hip arthroscopy 3/26/24    SUBJECTIVE:  Pt reports she's doing stepper at the gym with no difficulty, had needling last week that was painful, not sure if she notices any improvement.  HEP performed? Y  Pain: 0/10 anterior left hip     OBJECTIVE:  Left hip strength: hip flexion 4-/5, abd 4-/5, ER 4-/5  +TTP anterior left hip  TREATMENT:  Elliptical x 8' L5 warm-up  - Manual Therapy:  Hip mobilization in all directions, DTM anterior hip and proximal adductors x 20 min  - Therex:   Kneeling hip flexor stretch 5x30  Standing hip flexor/quad stretch 30\"x3  Rock backs x 10  Prone glute ext 3x10  Quad fire hydrant  Bird dogs 3x10 ea  Clam shell 3x10 GTB  SL hip abd 3x10 GTB  Figure 4 stretch 3x30 sec  Seated butterfly stretch 3x30 sec  SL bridge 3x10  SL TG squat 3x10 L7+1.5  2-way lunge slide 2x10 ea  Total hip abd and ext 3x10 L3    ASSESSMENT:  Patient will add new glute exercises to gym routine, continued glute weakness and passive hip ext/abd as main deficit.    PLAN:  Sees ortho 10/9 to discuss injection, continue with PT.  "

## 2024-10-04 ENCOUNTER — TREATMENT (OUTPATIENT)
Dept: PHYSICAL THERAPY | Facility: CLINIC | Age: 39
End: 2024-10-04
Payer: COMMERCIAL

## 2024-10-04 DIAGNOSIS — M25.852 FEMOROACETABULAR IMPINGEMENT OF LEFT HIP: ICD-10-CM

## 2024-10-04 PROCEDURE — 97110 THERAPEUTIC EXERCISES: CPT | Mod: GP | Performed by: PHYSICAL THERAPIST

## 2024-10-04 PROCEDURE — 97140 MANUAL THERAPY 1/> REGIONS: CPT | Mod: GP | Performed by: PHYSICAL THERAPIST

## 2024-10-04 NOTE — PROGRESS NOTES
"  Physical Therapy Treatment    Patient Name: Christine Bustos  MRN: [id number]  Today's Date: 10/4/2024  Visit: 50/MN    Diagnosis:   1. Femoroacetabular impingement of left hip  Follow Up In Physical Therapy      PRECAUTIONS:  Left hip arthroscopy 3/26/24    SUBJECTIVE:  Pt reports more hip soreness over past few days, maybe connected to colder weather, sees ortho next week.  HEP performed? Y  Pain: 2/10 anterior left hip     OBJECTIVE:  Left hip strength: hip flexion 4-/5, abd 4-/5, ER 4-/5  +TTP anterior left hip  TREATMENT:  Elliptical x 8' L5 warm-up  - Manual Therapy:  Hip mobilization in all directions, DTM anterior hip and proximal adductors x 20 min  - Therex:   Kneeling hip flexor stretch 5x30  Standing hip flexor/quad stretch 30\"x3  Rock backs x 10  Prone glute ext 3x10  Quad fire hydrant  Bird dogs 3x10 ea  Clam shell 3x10 GTB  SL hip abd 3x10 GTB  Figure 4 stretch 3x30 sec  Seated butterfly stretch 3x30 sec  SL bridge 3x10  SL TG squat 3x10 L7+1.5  2-way lunge slide 2x10 ea  Total hip abd, flex,ext 2x15 L3    ASSESSMENT:  Patient with increased soreness today and slight tightness, at this point feel she should proceed with injection as I feel we have plateaued in therapy.    PLAN:  Continue with HEP and 1 more PT appointment prior to ortho appointment.  "

## 2024-10-08 ENCOUNTER — TREATMENT (OUTPATIENT)
Dept: PHYSICAL THERAPY | Facility: CLINIC | Age: 39
End: 2024-10-08
Payer: COMMERCIAL

## 2024-10-08 DIAGNOSIS — M25.852 FEMOROACETABULAR IMPINGEMENT OF LEFT HIP: Primary | ICD-10-CM

## 2024-10-08 DIAGNOSIS — M25.552 LEFT HIP PAIN: ICD-10-CM

## 2024-10-08 PROCEDURE — 97140 MANUAL THERAPY 1/> REGIONS: CPT | Mod: GP | Performed by: PHYSICAL THERAPIST

## 2024-10-08 PROCEDURE — 97110 THERAPEUTIC EXERCISES: CPT | Mod: GP | Performed by: PHYSICAL THERAPIST

## 2024-10-08 NOTE — PROGRESS NOTES
"  Physical Therapy Treatment    Patient Name: Christine Bustos  MRN: [id number]  Today's Date: 10/8/2024  Visit: 51/MN    Diagnosis:   1. Femoroacetabular impingement of left hip        2. Left hip pain        PRECAUTIONS:  Left hip arthroscopy 3/26/24    SUBJECTIVE:  Pt reports no significant change in hip, sees ortho tomorrow.  HEP performed? Y  Pain: 2/10 anterior left hip     OBJECTIVE:  Left hip strength: hip flexion 4-/5, abd 4-/5, ER 4-/5  +TTP anterior left hip  TREATMENT:  Elliptical x 8' L5 warm-up  - Manual Therapy:  Hip mobilization in all directions, DTM anterior hip and proximal adductors x 20 min  - Therex:   Kneeling hip flexor stretch 5x30  Standing hip flexor/quad stretch 30\"x3  Rock backs x 10  Prone glute ext 3x10  Quad fire hydrant  Bird dogs 3x10 ea  Clam shell 3x10 GTB  SL hip abd 3x10 GTB  Figure 4 stretch 3x30 sec  Seated butterfly stretch 3x30 sec  SL bridge 3x10  SL TG squat 3x10 L7+2  2-way lunge slide 2x10 ea  Total hip abd, flex,ext 2x15 L3    ASSESSMENT:  Patient with continued signs of capsular tightness in hip, strength gradually improving as well as soft tissue flexibility but ROM remains limited, will update ortho and recommend proceeding with injection.    PLAN:  Further therapy pending physician visit.  "

## 2024-10-09 ENCOUNTER — OFFICE VISIT (OUTPATIENT)
Dept: HEMATOLOGY/ONCOLOGY | Facility: CLINIC | Age: 39
End: 2024-10-09
Payer: COMMERCIAL

## 2024-10-09 ENCOUNTER — OFFICE VISIT (OUTPATIENT)
Dept: ORTHOPEDIC SURGERY | Facility: HOSPITAL | Age: 39
End: 2024-10-09
Payer: COMMERCIAL

## 2024-10-09 ENCOUNTER — APPOINTMENT (OUTPATIENT)
Dept: ORTHOPEDIC SURGERY | Facility: HOSPITAL | Age: 39
End: 2024-10-09
Payer: COMMERCIAL

## 2024-10-09 VITALS
TEMPERATURE: 97.2 F | OXYGEN SATURATION: 100 % | DIASTOLIC BLOOD PRESSURE: 79 MMHG | SYSTOLIC BLOOD PRESSURE: 119 MMHG | BODY MASS INDEX: 18.82 KG/M2 | HEIGHT: 67 IN | RESPIRATION RATE: 16 BRPM | HEART RATE: 65 BPM | WEIGHT: 119.93 LBS

## 2024-10-09 DIAGNOSIS — R59.1 LYMPHADENOPATHY: ICD-10-CM

## 2024-10-09 DIAGNOSIS — R23.3 ABNORMAL BRUISING: Primary | ICD-10-CM

## 2024-10-09 DIAGNOSIS — E61.1 IRON DEFICIENCY: Primary | ICD-10-CM

## 2024-10-09 DIAGNOSIS — M25.852 FEMOROACETABULAR IMPINGEMENT OF LEFT HIP: Primary | ICD-10-CM

## 2024-10-09 DIAGNOSIS — S73.192D TEAR OF LEFT ACETABULAR LABRUM, SUBSEQUENT ENCOUNTER: ICD-10-CM

## 2024-10-09 DIAGNOSIS — D72.819 LEUKOPENIA, UNSPECIFIED TYPE: ICD-10-CM

## 2024-10-09 LAB
ALBUMIN SERPL BCP-MCNC: 4.4 G/DL (ref 3.4–5)
ALP SERPL-CCNC: 56 U/L (ref 33–110)
ALT SERPL W P-5'-P-CCNC: 8 U/L (ref 7–45)
ANION GAP SERPL CALC-SCNC: 12 MMOL/L (ref 10–20)
APTT PPP: 33 SECONDS (ref 27–38)
AST SERPL W P-5'-P-CCNC: 12 U/L (ref 9–39)
BASOPHILS # BLD AUTO: 0.01 X10*3/UL (ref 0–0.1)
BASOPHILS NFR BLD AUTO: 0.2 %
BILIRUB SERPL-MCNC: 0.6 MG/DL (ref 0–1.2)
BUN SERPL-MCNC: 14 MG/DL (ref 6–23)
CALCIUM SERPL-MCNC: 9.2 MG/DL (ref 8.6–10.3)
CHLORIDE SERPL-SCNC: 106 MMOL/L (ref 98–107)
CO2 SERPL-SCNC: 25 MMOL/L (ref 21–32)
CREAT SERPL-MCNC: 0.82 MG/DL (ref 0.5–1.05)
CRP SERPL-MCNC: <0.1 MG/DL
EBV EA IGG SER QL: NEGATIVE
EBV NA AB SER QL: POSITIVE
EBV VCA IGG SER IA-ACNC: POSITIVE
EBV VCA IGM SER IA-ACNC: NEGATIVE
EGFRCR SERPLBLD CKD-EPI 2021: >90 ML/MIN/1.73M*2
EOSINOPHIL # BLD AUTO: 0.06 X10*3/UL (ref 0–0.7)
EOSINOPHIL NFR BLD AUTO: 1.4 %
ERYTHROCYTE [DISTWIDTH] IN BLOOD BY AUTOMATED COUNT: 12.8 % (ref 11.5–14.5)
ERYTHROCYTE [SEDIMENTATION RATE] IN BLOOD BY WESTERGREN METHOD: 8 MM/H (ref 0–20)
FERRITIN SERPL-MCNC: 19 NG/ML (ref 8–150)
FOLATE SERPL-MCNC: 13.3 NG/ML
GLUCOSE SERPL-MCNC: 90 MG/DL (ref 74–99)
HBV CORE AB SER QL: NONREACTIVE
HBV CORE IGM SER QL: NONREACTIVE
HBV SURFACE AG SERPL QL IA: NONREACTIVE
HCT VFR BLD AUTO: 39.5 % (ref 36–46)
HCV AB SER QL: NONREACTIVE
HGB BLD-MCNC: 12.1 G/DL (ref 12–16)
HGB RETIC QN: 34 PG (ref 28–38)
HIV 1+2 AB+HIV1 P24 AG SERPL QL IA: NONREACTIVE
IGA SERPL-MCNC: 125 MG/DL (ref 70–400)
IGG SERPL-MCNC: 984 MG/DL (ref 700–1600)
IGM SERPL-MCNC: 74 MG/DL (ref 40–230)
IMM GRANULOCYTES # BLD AUTO: 0.01 X10*3/UL (ref 0–0.7)
IMM GRANULOCYTES NFR BLD AUTO: 0.2 % (ref 0–0.9)
IMMATURE RETIC FRACTION: 13.8 %
INR PPP: 1 (ref 0.9–1.1)
IRON SATN MFR SERPL: 13 % (ref 25–45)
IRON SERPL-MCNC: 46 UG/DL (ref 35–150)
LDH SERPL L TO P-CCNC: 128 U/L (ref 84–246)
LYMPHOCYTES # BLD AUTO: 1.32 X10*3/UL (ref 1.2–4.8)
LYMPHOCYTES NFR BLD AUTO: 29.7 %
MCH RBC QN AUTO: 30.7 PG (ref 26–34)
MCHC RBC AUTO-ENTMCNC: 30.6 G/DL (ref 32–36)
MCV RBC AUTO: 100 FL (ref 80–100)
MONOCYTES # BLD AUTO: 0.34 X10*3/UL (ref 0.1–1)
MONOCYTES NFR BLD AUTO: 7.7 %
NEUTROPHILS # BLD AUTO: 2.7 X10*3/UL (ref 1.2–7.7)
NEUTROPHILS NFR BLD AUTO: 60.8 %
NRBC BLD-RTO: ABNORMAL /100{WBCS}
PLATELET # BLD AUTO: 224 X10*3/UL (ref 150–450)
POTASSIUM SERPL-SCNC: 4.1 MMOL/L (ref 3.5–5.3)
PROT SERPL-MCNC: 7.2 G/DL (ref 6.4–8.2)
PROTHROMBIN TIME: 11 SECONDS (ref 9.8–12.8)
RBC # BLD AUTO: 3.94 X10*6/UL (ref 4–5.2)
RETICS #: 0.08 X10*6/UL (ref 0.02–0.08)
RETICS/RBC NFR AUTO: 2.1 % (ref 0.5–2)
SODIUM SERPL-SCNC: 139 MMOL/L (ref 136–145)
TIBC SERPL-MCNC: 341 UG/DL (ref 240–445)
UIBC SERPL-MCNC: 295 UG/DL (ref 110–370)
VIT B12 SERPL-MCNC: 364 PG/ML (ref 211–911)
WBC # BLD AUTO: 4.4 X10*3/UL (ref 4.4–11.3)

## 2024-10-09 PROCEDURE — 86664 EPSTEIN-BARR NUCLEAR ANTIGEN: CPT | Mod: GEALAB

## 2024-10-09 PROCEDURE — 86663 EPSTEIN-BARR ANTIBODY: CPT | Mod: GEALAB

## 2024-10-09 PROCEDURE — 83615 LACTATE (LD) (LDH) ENZYME: CPT

## 2024-10-09 PROCEDURE — 87389 HIV-1 AG W/HIV-1&-2 AB AG IA: CPT

## 2024-10-09 PROCEDURE — 86140 C-REACTIVE PROTEIN: CPT

## 2024-10-09 PROCEDURE — 86645 CMV ANTIBODY IGM: CPT

## 2024-10-09 PROCEDURE — 88185 FLOWCYTOMETRY/TC ADD-ON: CPT | Mod: TC

## 2024-10-09 PROCEDURE — 86705 HEP B CORE ANTIBODY IGM: CPT | Mod: GEALAB

## 2024-10-09 PROCEDURE — 1036F TOBACCO NON-USER: CPT

## 2024-10-09 PROCEDURE — 3008F BODY MASS INDEX DOCD: CPT

## 2024-10-09 PROCEDURE — 85045 AUTOMATED RETICULOCYTE COUNT: CPT

## 2024-10-09 PROCEDURE — 85025 COMPLETE CBC W/AUTO DIFF WBC: CPT

## 2024-10-09 PROCEDURE — 82607 VITAMIN B-12: CPT | Mod: GEALAB

## 2024-10-09 PROCEDURE — 99213 OFFICE O/P EST LOW 20 MIN: CPT | Performed by: SPECIALIST/TECHNOLOGIST

## 2024-10-09 PROCEDURE — 80053 COMPREHEN METABOLIC PANEL: CPT

## 2024-10-09 PROCEDURE — 82525 ASSAY OF COPPER: CPT

## 2024-10-09 PROCEDURE — 36415 COLL VENOUS BLD VENIPUNCTURE: CPT

## 2024-10-09 PROCEDURE — 86704 HEP B CORE ANTIBODY TOTAL: CPT | Mod: GEALAB

## 2024-10-09 PROCEDURE — 82728 ASSAY OF FERRITIN: CPT

## 2024-10-09 PROCEDURE — 86665 EPSTEIN-BARR CAPSID VCA: CPT | Mod: GEALAB

## 2024-10-09 PROCEDURE — 99215 OFFICE O/P EST HI 40 MIN: CPT

## 2024-10-09 PROCEDURE — 83540 ASSAY OF IRON: CPT

## 2024-10-09 PROCEDURE — 87340 HEPATITIS B SURFACE AG IA: CPT | Mod: GEALAB

## 2024-10-09 PROCEDURE — 85652 RBC SED RATE AUTOMATED: CPT

## 2024-10-09 PROCEDURE — 85610 PROTHROMBIN TIME: CPT

## 2024-10-09 PROCEDURE — 1036F TOBACCO NON-USER: CPT | Performed by: SPECIALIST/TECHNOLOGIST

## 2024-10-09 PROCEDURE — 82746 ASSAY OF FOLIC ACID SERUM: CPT | Mod: GEALAB

## 2024-10-09 PROCEDURE — 99205 OFFICE O/P NEW HI 60 MIN: CPT

## 2024-10-09 PROCEDURE — 86803 HEPATITIS C AB TEST: CPT | Mod: GEALAB

## 2024-10-09 PROCEDURE — 82784 ASSAY IGA/IGD/IGG/IGM EACH: CPT | Mod: GEALAB

## 2024-10-09 RX ORDER — ALBUTEROL SULFATE 0.83 MG/ML
3 SOLUTION RESPIRATORY (INHALATION) AS NEEDED
OUTPATIENT
Start: 2024-10-16

## 2024-10-09 RX ORDER — HEPARIN SODIUM,PORCINE/PF 10 UNIT/ML
50 SYRINGE (ML) INTRAVENOUS AS NEEDED
OUTPATIENT
Start: 2024-10-16

## 2024-10-09 RX ORDER — HEPARIN 100 UNIT/ML
500 SYRINGE INTRAVENOUS AS NEEDED
OUTPATIENT
Start: 2024-10-16

## 2024-10-09 RX ORDER — FAMOTIDINE 10 MG/ML
20 INJECTION INTRAVENOUS ONCE AS NEEDED
OUTPATIENT
Start: 2024-10-16

## 2024-10-09 RX ORDER — DIPHENHYDRAMINE HYDROCHLORIDE 50 MG/ML
50 INJECTION INTRAMUSCULAR; INTRAVENOUS AS NEEDED
OUTPATIENT
Start: 2024-10-16

## 2024-10-09 RX ORDER — EPINEPHRINE 0.3 MG/.3ML
0.3 INJECTION SUBCUTANEOUS EVERY 5 MIN PRN
OUTPATIENT
Start: 2024-10-16

## 2024-10-09 SDOH — ECONOMIC STABILITY: FOOD INSECURITY: WITHIN THE PAST 12 MONTHS, THE FOOD YOU BOUGHT JUST DIDN'T LAST AND YOU DIDN'T HAVE MONEY TO GET MORE.: NEVER TRUE

## 2024-10-09 SDOH — ECONOMIC STABILITY: FOOD INSECURITY: WITHIN THE PAST 12 MONTHS, YOU WORRIED THAT YOUR FOOD WOULD RUN OUT BEFORE YOU GOT MONEY TO BUY MORE.: NEVER TRUE

## 2024-10-09 NOTE — PROGRESS NOTES
[facility]    PATIENT VISIT INFORMATION    Visit Type: New Visit    Referring Provider: Mike Angel MD  Reason for referral: Leukopenia   Primary Practice Provider: Mike Angel MD    HEMATOLGOY HISTORY    38 year old female presents for evaluation of leukopenia. Noted over the last 7 months WBC 3.6-4.1. No other abnormality on CBC.   ~March 2024 hip surgery with slow progression to healing.   ~Covid 12/2020 and 7/2021.   Nerve block to help smell and taste return. Covid recovery clinic d/t long Covid symptoms. Follows pulmonary for management. Cleared by cardiology. HX PFO.   Colonoscopy 2016 d/t rectal bleeding. mild inflammation in rectum. Pathology negative.  Endometriosis diagnosis.   Right axillae lymph node decreasing in size. Appeared 11/2023.   Biopsy show: FRAGMENTS OF LYMPH NODE SHOWING FOLLICULAR HYPERPLASIA WITH NO MORPHOLOGIC EVIDENCE OF LYMPHOMA OR CARCINOMA, SEE NOTE.   Following Surgery.     HISTORY OF PRESENT ILLNESS     ID Statement: Christine Bustos is a 38 year old female     Chief Complaint: Evaluation for low white count    Interval History:   Patient presents for evaluation of leukopenia.    She reports night sweats monthly around time of cycle. LMP: currently on her cycle. Heavy to light towards end. Recent PAP.  History of HPV infection.  Very fatigued throughout the day. Appetite decent, no weight loss. Denies fevers, worsening SOB, CP, palpitations, abdominal pain, n/v/c. Has IBS so she has a lot of diarrhea. No urinary complaints. No rashes, sores. Cold hands and feet, finger tips go numb outside. Melasma on her face follows dermatology.   No neurological or breast complaints. Axilla lump right side. No other lumps or bumps.  No other complaints.  PAST/CURRENT HISTORY     MEDICAL/SURGICAL HISTORY  -leukopenia  -ARF from chicken pox childhood  -tonsillitis and tonsillectomy   -2015 ruptured ectopic pregnancy   -2016 ectopic pregnancy    -endometriosis   -lap surgery ablation endometriosis   -second endometrial surgery 2017   -removal fallopian tubes  -nasal surgery nose bleeds in childhood with heavy clots  -menorrhagia   -childhood anemia on iron  -axillary lymph node poor blood flow    SOCIAL HISTORY  -Lives alone   -Work place: [employer]  and     -Tobacco/smokeless use: Denies   -Alcohol: Denies   -Illicit drug or marijuana use: Denies   -Yarsani or Spiritual beliefs: Denies   -Social Determinates of Health Concerns: Denies     FAMILY HISTORY  -Unknown BIO mom (death CHF/drug use)  -Paternal Grandfather pancreatic cancer  -Paternal grandmother CHF  -Paternal first cousin Breast cousin double mastectomy in surveillance, blood issues unknown type  -Paternal-second cousin (daughter of first cousin with BC) leukemia at age of two in remission   -No other known history of hematologic, bleeding, clotting, autoimmune, genetic, or malignant disorders in the family.     OCCUPATIONAL/ENVIRONMENTAL HISTORY/EXPOSURES:  -None reported    Active Problems, Allergy List, Medication List, and PMH/PSH/FH/Social Hx have been reviewed and reconciled in chart. Updates made when necessary.     REVIEW OF SYSTEMS   A review of systems has been completed and are negative for complaints except what is stated in the assessment, HPI, IH, ROS, and/or past medical history.    ALLERGIES AND MEDICATIONS     Allergies and Intolerances:   Allergies   Allergen Reactions    Ciprofloxacin Other     IV only when with flagyl skin was burning and red, oral ok    Flagyl [Metronidazole] Other     With cipro with IV burning skin hot and red, oral ok    Motrin [Ibuprofen] Other     As a  child had acute renal failure      Medication Profile:   Current Outpatient Medications   Medication Instructions    albuterol 90 mcg/actuation inhaler 2 puffs, inhalation, Every 4 hours PRN    fluticasone (Flonase) 50 mcg/actuation nasal spray 2 sprays, Each  "Nostril, Daily, Shake gently. Before first use, prime pump. After use, clean tip and replace cap.    multivitamin tablet 1 tablet, oral, Daily    Pulmicort Flexhaler 180 mcg/actuation inhaler 1 puff, inhalation, 2 times daily      Available Vaccination Record:     There is no immunization history on file for this patient.   PHYSICAL EXAM     Vital Signs/Measurements:       3/26/2024     2:45 PM 3/26/2024     2:58 PM 3/26/2024     3:11 PM 3/26/2024     3:33 PM 7/16/2024     4:06 PM 8/1/2024     3:03 PM 10/9/2024     9:21 AM   Vitals   Systolic 112 122 119 126 102  119   Diastolic 72 80 88 89 65  79   Heart Rate 66 73 88 84   65   Temp   36.6 °C (97.9 °F) 36.2 °C (97.2 °F)   36.2 °C (97.2 °F)   Resp 14 14 16 18   16   Height (in)       1.709 m (5' 7.28\")    Weight (lb)      120 119.93   BMI      18.25 kg/m2 18.63 kg/m2   BSA (m2)      1.62 m2 1.61 m2   Visit Report     Report Report Report       Significant value        Performance:   ECOG Performance Status: 0     Grade ECOG performance status   0 Fully active, able to carry on all pre-disease performance without restriction   1 Restricted in physically strenuous activity but ambulatory and able to carry out work of a light or sedentary nature, e.g., light housework, office work   2 Ambulatory and capable of all selfcare but unable to carry out any work activities; Up and about more than 50% of waking hours   3 Capable of only limited selfcare, confined to bed or chair more than 50% of waking hours   4 Completely disabled; Cannot carry out any selfcare; Totally confined to bed or chair   5 Dead     Physical Exam:  General: Patient is awake/alert/oriented x3, no distress, Nourished, hydrated, alert and cooperative, ambulating without difficulty  Skin: Expected color for ethnicity, good turgor, dry, no prominent lesions, rashes, unusual bruising, or bleeding   Hair: Normal texture and distribution   Nails: Normal color, no deformities    HEENT:   Head: Normocephalic, " atraumatic, no visible masses, depressions, or scarring   Eyes: Conjunctiva clear, sclera non-icteric, no exudates or hemorrhages   Ears: nl appearance, hearing intact    Nose: no external lesions, mucosa non-inflamed, no rhinorrhea   Mouth: Mucous membranes moist, no lesions, sores, bleeding, or erythema     Head/Neck: Neck supple, no apparent injury, thyroid without mass or tenderness, No JVD, trachea midline, no bruits appreciated    Respiratory/Thorax: Patent airways, CTAB, chest symmetry, normal inspiratory and expiratory effort    Cardiovascular: Regular rate and rhythm, no murmur or gallop, no carotid bruit or thrills    Gastrointestinal: Bowel sounds normal, non-distended, soft, no tenderness, no masses or hernia, or organomegaly appreciated    Genitourinary: deferred   Musculoskeletal: Normal gait, normal range of motion, no pain on palpation of spine, no deformity, normal strength for baseline, no atrophy, and no CVA tenderness appreciated   Extremities: No amputations or deformities, cyanosis, edema or viscosities, peripheral pulses intact   Neurological: Sensation present to touch, intact senses, motor response and reflexes normal, normal strength   Breast:    Lymphatic: No significant lymphadenopathy   Psychological: Intact recent and remote memory, judgement, and insight. Appropriate mood, affect, and behavior          RESULTS/DATA     Labs:     Lab Results   Component Value Date    WBC 4.1 (L) 07/18/2024    NEUTROABS 2.36 07/18/2024    IGABSOL 0.00 07/18/2024    LYMPHSABS 1.28 07/18/2024    MONOSABS 0.35 07/18/2024    EOSABS 0.08 07/18/2024    BASOSABS 0.03 07/18/2024    RBC 3.95 (L) 07/18/2024    MCV 97 07/18/2024    MCHC 32.7 07/18/2024    HGB 12.5 07/18/2024    HCT 38.2 07/18/2024     07/18/2024       Lab Results   Component Value Date    CREATININE 0.89 03/12/2024    BUN 13 03/12/2024    EGFR 85 03/12/2024     03/12/2024    K 4.1 03/12/2024     03/12/2024    CO2 28 03/12/2024     "  Lab Results   Component Value Date    ALT 16 03/12/2024    AST 17 03/12/2024    ALKPHOS 49 03/12/2024    BILITOT 0.7 03/12/2024      Lab Results   Component Value Date    TSH 1.92 03/05/2024     Lab Results   Component Value Date    TSH 1.92 03/05/2024   Labs:  Lab Results   Component Value Date    WBC 4.4 10/09/2024    NEUTROABS 2.70 10/09/2024    IGABSOL 0.01 10/09/2024    LYMPHSABS 1.32 10/09/2024    MONOSABS 0.34 10/09/2024    EOSABS 0.06 10/09/2024    BASOSABS 0.01 10/09/2024    RBC 3.94 (L) 10/09/2024     10/09/2024    MCHC 30.6 (L) 10/09/2024    HGB 12.1 10/09/2024    HCT 39.5 10/09/2024     10/09/2024     Lab Results   Component Value Date    RETICCTPCT 2.1 (H) 10/09/2024      Lab Results   Component Value Date    CREATININE 0.82 10/09/2024    BUN 14 10/09/2024    EGFR >90 10/09/2024     10/09/2024    K 4.1 10/09/2024     10/09/2024    CO2 25 10/09/2024      Lab Results   Component Value Date    ALT 8 10/09/2024    AST 12 10/09/2024    ALKPHOS 56 10/09/2024    BILITOT 0.6 10/09/2024      Lab Results   Component Value Date    TSH 1.92 03/05/2024     Lab Results   Component Value Date    TSH 1.92 03/05/2024     Lab Results   Component Value Date    IRON 46 10/09/2024    TIBC 341 10/09/2024    FERRITIN 19 10/09/2024      Lab Results   Component Value Date    NNCGQPDO08 364 10/09/2024      Lab Results   Component Value Date    FOLATE 13.3 10/09/2024     Lab Results   Component Value Date    SEDRATE 8 10/09/2024      Lab Results   Component Value Date    CRP <0.10 10/09/2024      No results found for: \"MICHAEL\"  Lab Results   Component Value Date     10/09/2024          Radiology/Studies:   US AXILLA ONLY RIGHT  4/19/2024  IMPRESSION: PROBABLY BENIGN - SHORT TERM INTERVAL FOLLOW-UP RECOMMENDED   Probable benign findings in the right axilla with continued appearance of   borderline-enlarged right axillary tail lymph nodes.  Minimally improved   since the previous study.   Continued " surveillance for stability or resolution is recommended with   repeat right axillary ultrasound within 6 months.   A follow-up ultrasound in 6 months is recommended to demonstrate   stability.   ASSESSMENT/PLAN     Assessment and Plan:   #1. Leukopenia   38 year old female presents for evaluation of leukopenia. Noted over the last 7 months WBC 3.6-4.1. No other abnormality on CBC.  Discussed leukopenia workup- possible causes including low nutritional values, infections or viruses, bone marrow suppression versus other etiology. Iron deficiency is noted will order IV iron.  Patient unable to tolerate oral iron.  We will meet in a few weeks to discuss workup.  CT chest abdomen pelvis to look for any lymphadenopathy.     **Please follow with specialties as scheduled for other comorbidities and routine health screenings.**    I have reviewed the patient's medical record including provider notes, laboratory and testing results, imaging, and procedures available within the system and outside the system pertinent to patient care.     Follow up:    RTC:  -3 weeks to review workup     Medications:  -IV iron Feraheme x2 doses    Imaging/Testing:  -CT CAP    Referral:  -NA    Other Pertinent Appointments:  -PT 10/14/2024  -Ortho 11/1/2024      Patient Discussion Summary:  Discussed plan of care in detail. Patient states understanding and in agreement. Answered all questions. They will call with any additional questions and/or concerns.    Thank you for allowing me to participate in your care. It was a pleasure meeting you.    Sincerely,  Yesenia Espitia, HOLLY-CNP       This document may have been written by voice recognition software.  There may be some incorrect wording, spelling and/or spelling errors or punctuation errors that were not corrected prior to committing the note to the medical record. Please request clarification if there is documentation error or clarification is needed.   Time based billing: Please see  documentation within this specific encounter.

## 2024-10-10 ENCOUNTER — TREATMENT (OUTPATIENT)
Dept: PHYSICAL THERAPY | Facility: CLINIC | Age: 39
End: 2024-10-10
Payer: COMMERCIAL

## 2024-10-10 DIAGNOSIS — M25.852 FEMOROACETABULAR IMPINGEMENT OF LEFT HIP: Primary | ICD-10-CM

## 2024-10-10 DIAGNOSIS — M25.552 LEFT HIP PAIN: ICD-10-CM

## 2024-10-10 LAB — PATH REVIEW-CBC DIFFERENTIAL: NORMAL

## 2024-10-10 PROCEDURE — 97140 MANUAL THERAPY 1/> REGIONS: CPT | Mod: GP | Performed by: PHYSICAL THERAPIST

## 2024-10-10 NOTE — PROGRESS NOTES
"  Physical Therapy Treatment    Patient Name: Christine Bustos  MRN: [id number]  Today's Date: 10/10/2024  Visit: 52/MN    Diagnosis:   1. Femoroacetabular impingement of left hip        2. Left hip pain        PRECAUTIONS:  Left hip arthroscopy 3/26/24    SUBJECTIVE:  Pt reports she met with ortho earlier in the week, they want to transfer care to [facility] for combined therapy and dry needling, holding off on joint injection at this time, patient also seeing Hematology for other issues.  HEP performed? Y  Pain: 2/10 anterior left hip     OBJECTIVE:  Left hip strength: hip flexion 4-/5, abd 4-/5, ER 4-/5 (in available ROM)    TREATMENT:  Elliptical x 8' L5 warm-up  - Manual Therapy:  Hip mobilization in all directions, DTM anterior hip and proximal adductors x 20 min  - Therex:   Kneeling hip flexor stretch 5x30  Standing hip flexor/quad stretch 30\"x3  Rock backs x 10  Prone glute ext 3x10  Quad fire hydrant  Bird dogs 3x10 ea  Clam shell 3x10 GTB  SL hip abd 3x10 GTB  Figure 4 stretch 3x30 sec  Seated butterfly stretch 3x30 sec  SL bridge 3x10  SL TG squat 3x10 L7+2  2-way lunge slide 2x10 ea  Total hip abd, flex,ext 2x15 L3    ASSESSMENT:  Patient with continued ROM restrictions and muscular weakness, ROM has hit a plateau despite consistent mobilization and home exercises, ortho wants to try 3 more weeks of combined dry needling and therapy at another location, patient will look to transfer next week but will keep current appointments in case.    PLAN:  Continue with HEP, care will be transferred to [facility].  "

## 2024-10-11 LAB
CMV IGM SERPL-ACNC: <8 AU/ML
COPPER SERPL-MCNC: 123 UG/DL (ref 80–155)

## 2024-10-11 NOTE — PROGRESS NOTES
The patient returns 6 months out from their Left hip arthroscopy on 3/26/24. Overall, she is doing well.  She reports a generalized tightness over the anterior hip and groin.  She continues with formal physical therapy at [facility].  She feels like she has plateaued at therapy.  She has not been running or jumping. She has been doing some dry needling over the groin muscles which has improved her gait. She has recently been seen by a hematologist for a persistent lymph node in her axilla with some recent blood work.  She notes her RBC and WBC counts are low and is undergoing further testing.     The patient and I had a lengthy discussion regarding her physical presentation 6 months status post left hip arthroscopy. I do feel that she is tighter on her left hip than her right.  She has reproducible pain over her hip flexor tendon.  Her strength his a little weaker, left side versus right. She lacks approximately 5+ degrees of external rotation.  We agreed upon changing the location of her physical therapy to [facility] to get a new assessment on her progress.  She is unable to take anti-inflammatory medications secondary to kidney issues as a child. Therefore, we agreed up topical Voltaren gel, placed over the anterior hip and thigh four times daily.  She will follow up on 11/1/2024.  She's in agreement with the plan.  Questions are answered.     Dr. Filiberto Cruz met with and examined the patient as well and formulated the treatment plan.        [place] PA-C

## 2024-10-12 LAB
CELL COUNT (BLOOD): 4.4 X10*3/UL
CELL POPULATIONS: NORMAL
CYTOGENETICS/MOLECULAR TEST ORDERED: NORMAL
DIAGNOSIS: NORMAL
FLOW DIFFERENTIAL: NORMAL
FLOW TEST ORDERED: NORMAL
LAB TEST METHOD: NORMAL
NUMBER OF CELLS COLLECTED: NORMAL PER TUBE
PATH REPORT.TOTAL CANCER: NORMAL
RBC MORPH BLD: NORMAL
SIGNATURE COMMENT: NORMAL
SPECIMEN VIABILITY: NORMAL
WBC MORPH BLD: NORMAL

## 2024-10-14 ENCOUNTER — TREATMENT (OUTPATIENT)
Dept: PHYSICAL THERAPY | Facility: CLINIC | Age: 39
End: 2024-10-14
Payer: COMMERCIAL

## 2024-10-14 DIAGNOSIS — M25.852 FEMOROACETABULAR IMPINGEMENT OF LEFT HIP: Primary | ICD-10-CM

## 2024-10-14 DIAGNOSIS — M25.552 LEFT HIP PAIN: ICD-10-CM

## 2024-10-14 PROCEDURE — 97110 THERAPEUTIC EXERCISES: CPT | Mod: GP | Performed by: PHYSICAL THERAPIST

## 2024-10-14 PROCEDURE — 97140 MANUAL THERAPY 1/> REGIONS: CPT | Mod: GP | Performed by: PHYSICAL THERAPIST

## 2024-10-14 NOTE — PROGRESS NOTES
"  Physical Therapy Treatment    Patient Name: Christine Bustos  MRN: [id number]  Today's Date: 10/14/2024  Visit: 53/MN    Diagnosis:   1. Femoroacetabular impingement of left hip        2. Left hip pain        PRECAUTIONS:  Left hip arthroscopy 3/26/24    SUBJECTIVE:  Pt reports she's had increased hip soreness since seeing ortho last week, will be transferring to [facility] next week once sessions can be set up, has iron infusion later this week and meets with hematology in early November to discuss blood work.  HEP performed? Y  Pain: 2/10 anterior left hip     OBJECTIVE:  Left hip strength: hip flexion 4-/5, abd 4-/5, ER 4-/5 (in available ROM)    TREATMENT:  Elliptical x 8' L5 warm-up  - Manual Therapy:  Hip mobilization in all directions, DTM anterior hip and proximal adductors x 20 min  - Therex:   Kneeling hip flexor stretch 5x30  Standing hip flexor/quad stretch 30\"x3  Rock backs x 10  Prone glute ext 3x10  Quad fire hydrant  Bird dogs 3x10 ea  Clam shell 3x10 GTB  SL hip abd 3x10 GTB  Figure 4 stretch 3x30 sec  Seated butterfly stretch 3x30 sec  SL bridge 3x10  SL TG squat 3x10 L7+2  2-way lunge slide 2x10 ea  Total hip abd, flex,ext 2x15 L3    ASSESSMENT:  Patient with continued ROM restrictions, having a hard time getting into new location this week so will keep scheduled appointment here.    PLAN:  Continue with HEP, care will be transferred to [facility] office next week.  "

## 2024-10-15 NOTE — PROGRESS NOTES
"  Physical Therapy Treatment    Patient Name: Christine Bustos  MRN: [id number]  Today's Date: 9/20/2024  Visit: 46/MN    Diagnosis:   1. Femoroacetabular impingement of left hip  Follow Up In Physical Therapy      PRECAUTIONS:  Left hip arthroscopy 3/26/24    SUBJECTIVE:  Pt reports no significant change in hip symptoms.  HEP performed? Y  Pain: 0/10 anterior left hip     OBJECTIVE:  Left hip strength: hip flexion 4-/5, abd 4-/5, ER 4-/5  +TTP anterior left hip  TREATMENT:  Elliptical x 8' L5 warm-up  - Manual Therapy:  Hip mobilization in all directions, DTM anterior hip and proximal adductors x 20 min  - Therex:   Kneeling hip flexor stretch 5x30  Standing hip flexor/quad stretch 30\"x3  Rock backs x 10  Prone glute ext 3x10  Quad fire hydrant  Bird dogs 3x10 ea  Clam shell 3x10 RTB  SL hip abd 3x10 RTB  Figure 4 stretch 3x30 sec  Seated butterfly stretch 3x30 sec  SL bridge 3x10  SL TG squat 3x10 L7+1  2-way lunge slide 2x10 ea  Total hip abd 3x10 L3    ASSESSMENT:  Increased tenderness to rectus and proximal adductors today, ROM does continue to slowly improve.    PLAN:  Continue with current routine as tolerated, progress with exercises and activity as tolerated.  " Patient ID: Vern Bateman is a 61 year old male.     Chief Complaint   Patient presents with    Office Visit        HPI  Acute visit  Couple of weeks ago noticed hard mass in right breast  Seen by dermatologist yesterday   Father with hx of breast cancer  Personal hx of squamous cell ca          ALLERGIES:  Patient has no known allergies.    Patient Active Problem List   Diagnosis    Anemia due to chronic kidney disease    ESRD (end stage renal disease)  (CMD)    HTN (hypertension)    Osteopenia    Secondary hyperparathyroidism  (CMD)    Viral warts    Preventative health care    Special screening for malignant neoplasms, colon    Encounter for pre-transplant evaluation for chronic kidney disease    Heartburn    Fecal urgency    Primary osteoarthritis of left hip    Primary osteoarthritis of left knee    Mass of right breast       Past Medical History:   Diagnosis Date    Acne     Anemia due to chronic kidney disease     CKD (chronic kidney disease), stage III  (CMD)     ESRD (end stage renal disease)  (CMD)     with kidney transplant, from his father 30 years ago    HTN (hypertension)     Hyperlipidemia     Kidney replaced by transplant (CMD)     Osteopenia     Secondary hyperparathyroidism  (CMD)     Skin cancer     SCC, BCC    Viral warts     due to rejection medications    Vitamin D deficiency        Past Surgical History:   Procedure Laterality Date    Foot surgery Right     ORIF    Kidney transplant  1982    Living related donor - father    Skin cancer excision      Wrist surgery Left        Family History   Problem Relation Age of Onset    Cancer Mother     Cancer, Pancreatic Mother     Cancer Father     Cancer, Breast Father     Non-Hodgkin's Lymphoma Father     Cancer, Colon Neg Hx     Cancer, Esophageal Neg Hx     Cancer, Liver Neg Hx     Cancer, Rectal Neg Hx     Cancer, Stomach Neg Hx        Social History     Tobacco Use    Smoking status: Former     Types: Cigarettes    Smokeless tobacco: Never    Substance Use Topics    Alcohol use: Yes     Comment: social- once every 1-2 weeks    Drug use: Never       Immunization History   Administered Date(s) Administered    COVID Pfizer 12Y+ (Requires Dilution) 03/15/2021, 04/12/2021, 08/19/2021, 03/04/2022    COVID Pfizer Bivalent 12Y+ 10/29/2022    COVID Pfizer/Comirnaty 12+ 10/15/2023    HPV 9-Valent 04/20/2024    Influenza, MDCK, quadrivalent, PF 02/03/2018, 09/13/2020, 10/29/2022    Influenza, high-dose, trivalent, PF 02/17/2015    Influenza, split virus, quadrivalent, PF 10/12/2018, 10/14/2018, 10/12/2019    Influenza, split virus, trivalent 10/01/2021    Influenza, split virus, trivalent, PF 01/01/2017, 02/03/2018, 09/17/2020, 09/17/2020    Zostavax (Zoster Shingles) 01/15/2020, 03/27/2020    Zoster recombinant 01/15/2020, 03/27/2020        Current Outpatient Medications   Medication Sig    atorvastatin (LIPITOR) 40 MG tablet TAKE 1 TABLET DAILY (COURTESY REFILL, NEEDS A FOLLOW UP APPOINTMENT WITH PRIMARY CARE PHYSICIAN)    mycophenolate (MYFORTIC) 180 MG DR tablet Take 4 tablets by mouth in the morning and 4 tablets in the evening. Indications: Kidney Transplant Recipient.    MAGNESIUM OXIDE 400 PO Take 1 tablet by mouth daily.    aspirin 81 MG chewable tablet Chew 81 mg by mouth.    clobetasol (TEMOVATE) 0.05 % cream Apply 1 Application topically.    Envarsus XR 1 MG extended-release tablet Take 2 mg by mouth.    tamsulosin (FLOMAX) 0.4 MG Cap Take 0.4 mg by mouth.    valGANciclovir (VALCYTE) 450 MG tablet Take 450 mg by mouth.    ezetimibe (ZETIA) 10 MG tablet Take 10 mg by mouth nightly.    epoetin michel (PROCRIT,EPOGEN) 53307 UNIT/ML injection Inject 10,000 Units into the skin.    Niacinamide-Zn-Cu-Wyttvf-Em-Ub (NICOTINAMIDE PO)     famotidine (PEPCID) 20 MG tablet Take 20 mg by mouth.    epoetin michel (PROCRIT,EPOGEN) 97484 UNIT/ML injection Inject 1 mL into the skin every 14 days.    predniSONE (DELTASONE) 5 MG tablet      No current facility-administered  medications for this visit.        Vitals:    10/15/24 1249   BP: (!) 143/67   BP Location: LUE - Left upper extremity   Patient Position: Sitting   Cuff Size: Regular   Pulse: 87   Resp: 16   Temp: 97.6 °F (36.4 °C)   TempSrc: Oral   SpO2: 100%   Weight: 83.3 kg (183 lb 8.5 oz)   Height: 5' 11\" (1.803 m)   PainSc:  0        Physical Exam  Vitals reviewed.   Constitutional:       Appearance: Normal appearance.      Comments: Hard mass around nipple of right breast retracting nipple   Neurological:      Mental Status: He is alert.         Assessment   Diagnoses and associated orders for this visit:  1. Mass of right breast, unspecified quadrant  Assessment & Plan:  Diagnostic mammo, US guided biopsy discussed  Message sent to surgeon  Referral , number provided for patient to schedule  Orders:  -     SERVICE TO SURGERY GENERAL       Orders Placed This Encounter    SERVICE TO SURGERY GENERAL          Time spent obtaining,reviewing record and history, mediations lists, exam, reviewing tests,counseling and educating patient/family/caregiver,ordering medications, tests, documenting clinical information    Follow up: No follow-ups on file.    Discussed medication dosage, usage, goals of therapy, and side effects.    The patient indicates understanding of these issues and agrees with the plan.    Electronically signed by:  Mary Ann Saxena MD

## 2024-10-16 ENCOUNTER — APPOINTMENT (OUTPATIENT)
Dept: PHYSICAL THERAPY | Facility: CLINIC | Age: 39
End: 2024-10-16
Payer: COMMERCIAL

## 2024-10-18 ENCOUNTER — INFUSION (OUTPATIENT)
Dept: HEMATOLOGY/ONCOLOGY | Facility: CLINIC | Age: 39
End: 2024-10-18
Payer: COMMERCIAL

## 2024-10-18 VITALS
SYSTOLIC BLOOD PRESSURE: 117 MMHG | HEART RATE: 68 BPM | TEMPERATURE: 97.3 F | RESPIRATION RATE: 16 BRPM | BODY MASS INDEX: 19.26 KG/M2 | WEIGHT: 124.01 LBS | OXYGEN SATURATION: 100 % | DIASTOLIC BLOOD PRESSURE: 68 MMHG

## 2024-10-18 DIAGNOSIS — E61.1 IRON DEFICIENCY: ICD-10-CM

## 2024-10-18 PROCEDURE — 96365 THER/PROPH/DIAG IV INF INIT: CPT | Mod: INF

## 2024-10-18 PROCEDURE — 2500000004 HC RX 250 GENERAL PHARMACY W/ HCPCS (ALT 636 FOR OP/ED): Mod: JZ,JG

## 2024-10-18 RX ORDER — EPINEPHRINE 0.3 MG/.3ML
0.3 INJECTION SUBCUTANEOUS EVERY 5 MIN PRN
Status: DISCONTINUED | OUTPATIENT
Start: 2024-10-18 | End: 2024-10-18 | Stop reason: HOSPADM

## 2024-10-18 RX ORDER — EPINEPHRINE 0.3 MG/.3ML
0.3 INJECTION SUBCUTANEOUS EVERY 5 MIN PRN
OUTPATIENT
Start: 2024-10-25

## 2024-10-18 RX ORDER — DIPHENHYDRAMINE HYDROCHLORIDE 50 MG/ML
50 INJECTION INTRAMUSCULAR; INTRAVENOUS AS NEEDED
Status: DISCONTINUED | OUTPATIENT
Start: 2024-10-18 | End: 2024-10-18 | Stop reason: HOSPADM

## 2024-10-18 RX ORDER — FAMOTIDINE 10 MG/ML
20 INJECTION INTRAVENOUS ONCE AS NEEDED
OUTPATIENT
Start: 2024-10-25

## 2024-10-18 RX ORDER — HEPARIN 100 UNIT/ML
500 SYRINGE INTRAVENOUS AS NEEDED
OUTPATIENT
Start: 2024-10-18

## 2024-10-18 RX ORDER — ALBUTEROL SULFATE 0.83 MG/ML
3 SOLUTION RESPIRATORY (INHALATION) AS NEEDED
OUTPATIENT
Start: 2024-10-25

## 2024-10-18 RX ORDER — FAMOTIDINE 10 MG/ML
20 INJECTION INTRAVENOUS ONCE AS NEEDED
Status: DISCONTINUED | OUTPATIENT
Start: 2024-10-18 | End: 2024-10-18 | Stop reason: HOSPADM

## 2024-10-18 RX ORDER — DIPHENHYDRAMINE HYDROCHLORIDE 50 MG/ML
50 INJECTION INTRAMUSCULAR; INTRAVENOUS AS NEEDED
OUTPATIENT
Start: 2024-10-25

## 2024-10-18 RX ORDER — HEPARIN SODIUM,PORCINE/PF 10 UNIT/ML
50 SYRINGE (ML) INTRAVENOUS AS NEEDED
OUTPATIENT
Start: 2024-10-18

## 2024-10-18 RX ORDER — ALBUTEROL SULFATE 0.83 MG/ML
3 SOLUTION RESPIRATORY (INHALATION) AS NEEDED
Status: DISCONTINUED | OUTPATIENT
Start: 2024-10-18 | End: 2024-10-18 | Stop reason: HOSPADM

## 2024-10-18 ASSESSMENT — PAIN SCALES - GENERAL: PAINLEVEL_OUTOF10: 0-NO PAIN

## 2024-10-18 NOTE — PROGRESS NOTES
Tolerated first dose of Feraheme without incident. Post VS pulse 68, /68. Scheduled for remaining dose and D/C'd [facility], ambulating to car without incident.

## 2024-10-21 ENCOUNTER — TREATMENT (OUTPATIENT)
Dept: PHYSICAL THERAPY | Facility: CLINIC | Age: 39
End: 2024-10-21
Payer: COMMERCIAL

## 2024-10-21 DIAGNOSIS — M25.852 FEMOROACETABULAR IMPINGEMENT OF LEFT HIP: ICD-10-CM

## 2024-10-21 DIAGNOSIS — S73.192D TEAR OF LEFT ACETABULAR LABRUM, SUBSEQUENT ENCOUNTER: ICD-10-CM

## 2024-10-21 PROCEDURE — 97140 MANUAL THERAPY 1/> REGIONS: CPT | Mod: GP

## 2024-10-21 NOTE — PROGRESS NOTES
Physical Therapy Treatment    Patient Name: Christine Busots  MRN: [id number]  Today's Date: 10/21/2024  Visit: 54/MN    Diagnosis:   1. Femoroacetabular impingement of left hip  Referral to Physical Therapy      2. Tear of left acetabular labrum, subsequent encounter  Referral to Physical Therapy      PRECAUTIONS:  Left hip arthroscopy 3/26/24    SUBJECTIVE:  Pt was being seen since March 27. She was progressing well but has hit a plateau with her hip stiffness. Luckily she has not been in much pain since surgery but is an active person and would like to get back to running and a pain free fitness routine. Because of this, Dr. Cruz wanted her to transfer to our facility.   HEP performed? Y  Pain: 2/10 anterior left hip     OBJECTIVE:  Left hip strength: hip flexion 4-/5, abd 4-/5, ER 4-/5 (in available ROM)    TREATMENT:    - Manual Therapy:  STM/DTM to R hip adductor, rectus, hip flexor and posterior/lateral hip  PROM hip flexion, IR and ER (within precautions)   Belted hip mobilizations with movement   Long axis distraction     - Therex:       ASSESSMENT:  Patient tolerated session well. Pt was very stiff in L hip flexion and ER when PROM was performed. Pt verbalized that she felt looser and less discomfort after manual techniques were performed. Pt continues to progress towards goals established in the POC and should continue with skilled therapy.      PLAN:  Continue to work on increasing hip flexion and ER flexibility. Work on progressing to return to run program in future appointments

## 2024-10-22 ENCOUNTER — APPOINTMENT (OUTPATIENT)
Dept: PHYSICAL THERAPY | Facility: CLINIC | Age: 39
End: 2024-10-22
Payer: COMMERCIAL

## 2024-10-24 ENCOUNTER — TREATMENT (OUTPATIENT)
Dept: PHYSICAL THERAPY | Facility: CLINIC | Age: 39
End: 2024-10-24
Payer: COMMERCIAL

## 2024-10-24 ENCOUNTER — INFUSION (OUTPATIENT)
Dept: HEMATOLOGY/ONCOLOGY | Facility: CLINIC | Age: 39
End: 2024-10-24
Payer: COMMERCIAL

## 2024-10-24 ENCOUNTER — APPOINTMENT (OUTPATIENT)
Dept: PHYSICAL THERAPY | Facility: CLINIC | Age: 39
End: 2024-10-24
Payer: COMMERCIAL

## 2024-10-24 VITALS
RESPIRATION RATE: 16 BRPM | TEMPERATURE: 97 F | OXYGEN SATURATION: 99 % | SYSTOLIC BLOOD PRESSURE: 106 MMHG | BODY MASS INDEX: 19.11 KG/M2 | HEART RATE: 64 BPM | WEIGHT: 123.02 LBS | DIASTOLIC BLOOD PRESSURE: 64 MMHG

## 2024-10-24 DIAGNOSIS — M25.552 LEFT HIP PAIN: ICD-10-CM

## 2024-10-24 DIAGNOSIS — E61.1 IRON DEFICIENCY: ICD-10-CM

## 2024-10-24 DIAGNOSIS — M25.852 FEMOROACETABULAR IMPINGEMENT OF LEFT HIP: Primary | ICD-10-CM

## 2024-10-24 PROCEDURE — 97110 THERAPEUTIC EXERCISES: CPT | Mod: GP

## 2024-10-24 PROCEDURE — 96365 THER/PROPH/DIAG IV INF INIT: CPT | Mod: INF

## 2024-10-24 PROCEDURE — 97140 MANUAL THERAPY 1/> REGIONS: CPT | Mod: GP

## 2024-10-24 PROCEDURE — 2500000004 HC RX 250 GENERAL PHARMACY W/ HCPCS (ALT 636 FOR OP/ED)

## 2024-10-24 RX ORDER — ALBUTEROL SULFATE 0.83 MG/ML
3 SOLUTION RESPIRATORY (INHALATION) AS NEEDED
OUTPATIENT
Start: 2024-10-25

## 2024-10-24 RX ORDER — HEPARIN SODIUM,PORCINE/PF 10 UNIT/ML
50 SYRINGE (ML) INTRAVENOUS AS NEEDED
OUTPATIENT
Start: 2024-10-24

## 2024-10-24 RX ORDER — DIPHENHYDRAMINE HYDROCHLORIDE 50 MG/ML
50 INJECTION INTRAMUSCULAR; INTRAVENOUS AS NEEDED
OUTPATIENT
Start: 2024-10-25

## 2024-10-24 RX ORDER — HEPARIN 100 UNIT/ML
500 SYRINGE INTRAVENOUS AS NEEDED
OUTPATIENT
Start: 2024-10-24

## 2024-10-24 RX ORDER — DIPHENHYDRAMINE HYDROCHLORIDE 50 MG/ML
50 INJECTION INTRAMUSCULAR; INTRAVENOUS AS NEEDED
Status: DISCONTINUED | OUTPATIENT
Start: 2024-10-24 | End: 2024-10-24 | Stop reason: HOSPADM

## 2024-10-24 RX ORDER — ALBUTEROL SULFATE 0.83 MG/ML
3 SOLUTION RESPIRATORY (INHALATION) AS NEEDED
Status: DISCONTINUED | OUTPATIENT
Start: 2024-10-24 | End: 2024-10-24 | Stop reason: HOSPADM

## 2024-10-24 RX ORDER — EPINEPHRINE 0.3 MG/.3ML
0.3 INJECTION SUBCUTANEOUS EVERY 5 MIN PRN
Status: DISCONTINUED | OUTPATIENT
Start: 2024-10-24 | End: 2024-10-24 | Stop reason: HOSPADM

## 2024-10-24 RX ORDER — EPINEPHRINE 0.3 MG/.3ML
0.3 INJECTION SUBCUTANEOUS EVERY 5 MIN PRN
OUTPATIENT
Start: 2024-10-25

## 2024-10-24 RX ORDER — FAMOTIDINE 10 MG/ML
20 INJECTION INTRAVENOUS ONCE AS NEEDED
Status: DISCONTINUED | OUTPATIENT
Start: 2024-10-24 | End: 2024-10-24 | Stop reason: HOSPADM

## 2024-10-24 RX ORDER — FAMOTIDINE 10 MG/ML
20 INJECTION INTRAVENOUS ONCE AS NEEDED
OUTPATIENT
Start: 2024-10-25

## 2024-10-24 ASSESSMENT — PAIN SCALES - GENERAL: PAINLEVEL_OUTOF10: 0-NO PAIN

## 2024-10-24 NOTE — PROGRESS NOTES
Physical Therapy Treatment    Patient Name: Christine Bustos  MRN: [id number]  Today's Date: 10/24/2024  Visit: 55/MN    Diagnosis:   1. Femoroacetabular impingement of left hip        2. Left hip pain          PRECAUTIONS:  Left hip arthroscopy 3/26/24    SUBJECTIVE:  Pt continues to feel like her hip has been stiff but she is not having much pain   HEP performed? Y  Pain: 2/10 anterior left hip     OBJECTIVE:  Left hip strength: hip flexion 4-/5, abd 4-/5, ER 4-/5 (in available ROM)    TREATMENT:    - Manual Therapy:  FDN performed to L adductor with .2x30mm needles    STM/DTM to R hip adductor, rectus, hip flexor and posterior/lateral hip  PROM hip flexion, IR and ER (within precautions)   Belted hip mobilizations with movement   Long axis distraction     - Therex:   SL leg press (95 on R, 65 on L) 3 x 10   Pt education on prone ER/IR stretch with belt     ASSESSMENT:  Patient tolerated session well. Pt continued to have tightness into IR and ER. Pt had a decent strength discrepancy when comparing L to R on SL leg press. Pt was educated to focus on L LE strengthening when at the gym with SL leg press and split squat exercises. Pt continues to progress towards goals established in the POC and should continue with skilled therapy.        PLAN:  Continue to work on increasing hip flexion and ER flexibility. Work on progressing to return to run program in future appointments    Detail Level: Detailed Depth Of Biopsy: dermis Was A Bandage Applied: Yes Size Of Lesion In Cm: 1 X Size Of Lesion In Cm: 0 Anticipated Plan (Based On Presumed Biopsy Results): [place] Biopsy Type: H and E Biopsy Method: Dermablade Anesthesia Type: 1% lidocaine with epinephrine Anesthesia Volume In Cc (Will Not Render If 0): 0.5 Hemostasis: Drysol Wound Care: Petrolatum Dressing: bandage Destruction After The Procedure: No Type Of Destruction Used: Curettage Curettage Text: The wound bed was treated with curettage after the biopsy was performed. Cryotherapy Text: The wound bed was treated with cryotherapy after the biopsy was performed. Electrodesiccation Text: The wound bed was treated with electrodesiccation after the biopsy was performed. Electrodesiccation And Curettage Text: The wound bed was treated with electrodesiccation and curettage after the biopsy was performed. Silver Nitrate Text: The wound bed was treated with silver nitrate after the biopsy was performed. Lab Facility: 3 Consent: Written consent was obtained and risks were reviewed including but not limited to scarring, infection, bleeding, scabbing, incomplete removal, nerve damage and allergy to anesthesia. Post-Care Instructions: I reviewed with the patient in detail post-care instructions. Patient is to keep the biopsy site dry overnight, and then apply bacitracin twice daily until healed. Patient may apply hydrogen peroxide soaks to remove any crusting. Notification Instructions: Patient will be notified of biopsy results. However, patient instructed to call the office if not contacted within 2 weeks. Billing Type: Third-Party Bill Information: Selecting Yes will display possible errors in your note based on the variables you have selected. This validation is only offered as a suggestion for you. PLEASE NOTE THAT THE VALIDATION TEXT WILL BE REMOVED WHEN YOU FINALIZE YOUR NOTE. IF YOU WANT TO FAX A PRELIMINARY NOTE YOU WILL NEED TO TOGGLE THIS TO 'NO' IF YOU DO NOT WANT IT IN YOUR FAXED NOTE.

## 2024-10-24 NOTE — PROGRESS NOTES
Christine Bustos self ambulated to [facility] for Feraheme .  Pt tolerated treatment without incident.  Gait steady upon DC home.  Christine Bustos denies further questions/concerns/comments and agrees to POC going forward.

## 2024-10-28 ENCOUNTER — HOSPITAL ENCOUNTER (OUTPATIENT)
Dept: RADIOLOGY | Facility: CLINIC | Age: 39
Discharge: HOME | End: 2024-10-28
Payer: COMMERCIAL

## 2024-10-28 ENCOUNTER — APPOINTMENT (OUTPATIENT)
Dept: PHYSICAL THERAPY | Facility: CLINIC | Age: 39
End: 2024-10-28
Payer: COMMERCIAL

## 2024-10-28 DIAGNOSIS — R59.1 LYMPHADENOPATHY: ICD-10-CM

## 2024-10-28 DIAGNOSIS — D72.819 LEUKOPENIA, UNSPECIFIED TYPE: ICD-10-CM

## 2024-10-28 DIAGNOSIS — R23.3 ABNORMAL BRUISING: ICD-10-CM

## 2024-10-28 PROCEDURE — 2550000001 HC RX 255 CONTRASTS

## 2024-10-28 PROCEDURE — 74177 CT ABD & PELVIS W/CONTRAST: CPT | Performed by: RADIOLOGY

## 2024-10-28 PROCEDURE — 74177 CT ABD & PELVIS W/CONTRAST: CPT

## 2024-10-28 PROCEDURE — 71260 CT THORAX DX C+: CPT | Performed by: RADIOLOGY

## 2024-10-30 ENCOUNTER — TELEPHONE (OUTPATIENT)
Dept: HEMATOLOGY/ONCOLOGY | Facility: CLINIC | Age: 39
End: 2024-10-30
Payer: COMMERCIAL

## 2024-10-30 DIAGNOSIS — N85.8 MASS OF UTERUS: Primary | ICD-10-CM

## 2024-10-31 ENCOUNTER — OFFICE VISIT (OUTPATIENT)
Dept: OBSTETRICS AND GYNECOLOGY | Facility: CLINIC | Age: 39
End: 2024-10-31
Payer: COMMERCIAL

## 2024-10-31 VITALS
HEART RATE: 80 BPM | DIASTOLIC BLOOD PRESSURE: 82 MMHG | BODY MASS INDEX: 19.16 KG/M2 | SYSTOLIC BLOOD PRESSURE: 119 MMHG | WEIGHT: 123.4 LBS

## 2024-10-31 DIAGNOSIS — N93.9 ABNORMAL UTERINE BLEEDING (AUB): ICD-10-CM

## 2024-10-31 DIAGNOSIS — R10.2 FEMALE PELVIC PAIN: ICD-10-CM

## 2024-10-31 DIAGNOSIS — N85.8 MASS OF UTERUS: Primary | ICD-10-CM

## 2024-10-31 PROCEDURE — 99204 OFFICE O/P NEW MOD 45 MIN: CPT | Performed by: STUDENT IN AN ORGANIZED HEALTH CARE EDUCATION/TRAINING PROGRAM

## 2024-11-01 ENCOUNTER — APPOINTMENT (OUTPATIENT)
Dept: PHYSICAL THERAPY | Facility: CLINIC | Age: 39
End: 2024-11-01
Payer: COMMERCIAL

## 2024-11-01 ENCOUNTER — APPOINTMENT (OUTPATIENT)
Dept: ORTHOPEDIC SURGERY | Facility: HOSPITAL | Age: 39
End: 2024-11-01
Payer: COMMERCIAL

## 2024-11-01 ENCOUNTER — TREATMENT (OUTPATIENT)
Dept: PHYSICAL THERAPY | Facility: CLINIC | Age: 39
End: 2024-11-01
Payer: COMMERCIAL

## 2024-11-01 DIAGNOSIS — M25.852 FEMOROACETABULAR IMPINGEMENT OF LEFT HIP: ICD-10-CM

## 2024-11-01 PROCEDURE — 97110 THERAPEUTIC EXERCISES: CPT | Mod: GP

## 2024-11-01 PROCEDURE — 97140 MANUAL THERAPY 1/> REGIONS: CPT | Mod: GP

## 2024-11-04 NOTE — PROGRESS NOTES
Physical Therapy Treatment    Patient Name: Christine Bustos  MRN: [id number]  Today's Date: 11/5/2024  Visit: 57/MN    Diagnosis:   1. Femoroacetabular impingement of left hip  Follow Up In Physical Therapy          PRECAUTIONS:  Left hip arthroscopy 3/26/24    SUBJECTIVE:  Pt has been having less pain in her lateral hip. She has not had any increase in hip pain over the last week and has been keeping up with her HEP  HEP performed? Y  Pain: 2/10 anterior left hip     OBJECTIVE:  Left hip strength: hip flexion 4-/5, abd 4-/5, ER 4-/5 (in available ROM)    TREATMENT:    - Manual Therapy:    STM/DTM to R hip adductor, rectus, hip flexor and posterior/lateral hip  PROM hip flexion, IR and ER (within precautions)   Belted hip mobilizations with movement   Long axis distraction     - Therex:   Banded side steps 3 x 40' (green)  Goblet squats with GTB and 10#  3 x 10   Resisted 3 way sliders with GTB at knees  x 10 ea/leg    ASSESSMENT:  Patient tolerated session well. She continues to have tightness in L hip especially in ER.  She tolerated strengthening with appropriate fatigue of outer hips but no increased pain.  Pt continues to progress towards goals established in the POC and should continue with skilled therapy.      PLAN:  Continue to work on increasing hip flexion and ER flexibility. Work on progressing to return to run program in future appointments

## 2024-11-05 ENCOUNTER — TREATMENT (OUTPATIENT)
Dept: PHYSICAL THERAPY | Facility: CLINIC | Age: 39
End: 2024-11-05
Payer: COMMERCIAL

## 2024-11-05 DIAGNOSIS — M25.852 FEMOROACETABULAR IMPINGEMENT OF LEFT HIP: ICD-10-CM

## 2024-11-05 PROCEDURE — 97110 THERAPEUTIC EXERCISES: CPT | Mod: GP,CQ

## 2024-11-05 PROCEDURE — 97140 MANUAL THERAPY 1/> REGIONS: CPT | Mod: GP,CQ

## 2024-11-06 ENCOUNTER — OFFICE VISIT (OUTPATIENT)
Dept: HEMATOLOGY/ONCOLOGY | Facility: CLINIC | Age: 39
End: 2024-11-06
Payer: COMMERCIAL

## 2024-11-06 VITALS
TEMPERATURE: 97.5 F | DIASTOLIC BLOOD PRESSURE: 77 MMHG | WEIGHT: 123.68 LBS | OXYGEN SATURATION: 100 % | HEART RATE: 59 BPM | BODY MASS INDEX: 19.21 KG/M2 | RESPIRATION RATE: 17 BRPM | SYSTOLIC BLOOD PRESSURE: 112 MMHG

## 2024-11-06 DIAGNOSIS — D72.819 LEUKOPENIA, UNSPECIFIED TYPE: ICD-10-CM

## 2024-11-06 PROCEDURE — 99214 OFFICE O/P EST MOD 30 MIN: CPT

## 2024-11-06 ASSESSMENT — COLUMBIA-SUICIDE SEVERITY RATING SCALE - C-SSRS
1. IN THE PAST MONTH, HAVE YOU WISHED YOU WERE DEAD OR WISHED YOU COULD GO TO SLEEP AND NOT WAKE UP?: NO
6. HAVE YOU EVER DONE ANYTHING, STARTED TO DO ANYTHING, OR PREPARED TO DO ANYTHING TO END YOUR LIFE?: NO
2. HAVE YOU ACTUALLY HAD ANY THOUGHTS OF KILLING YOURSELF?: NO

## 2024-11-06 ASSESSMENT — PATIENT HEALTH QUESTIONNAIRE - PHQ9
2. FEELING DOWN, DEPRESSED OR HOPELESS: NOT AT ALL
SUM OF ALL RESPONSES TO PHQ9 QUESTIONS 1 AND 2: 0
1. LITTLE INTEREST OR PLEASURE IN DOING THINGS: NOT AT ALL

## 2024-11-06 ASSESSMENT — PAIN SCALES - GENERAL: PAINLEVEL_OUTOF10: 0-NO PAIN

## 2024-11-06 NOTE — PROGRESS NOTES
Physical Therapy Treatment    Patient Name: Christine Bustos  MRN: [id number]  Today's Date: 11/7/2024  Visit: 58/MN    Diagnosis:   1. Femoroacetabular impingement of left hip  Follow Up In Physical Therapy          PRECAUTIONS:  Left hip arthroscopy 3/26/24    SUBJECTIVE:  Patient reports that  she had slight irritation in HF later in the day after last session but is good now. She has been keeping up with her HEP  HEP performed? Y  Pain: 2/10 anterior left hip     OBJECTIVE:  Left hip strength: hip flexion 4-/5, abd 4-/5, ER 4-/5 (in available ROM)    TREATMENT:    - Manual Therapy:  FDN performed by Jeffry Chun PT to L adductor and L lateral glut with .2x30mm needles  STM/DTM to R hip adductor, rectus, hip flexor and posterior/lateral hip  PROM hip flexion, IR and ER (within precautions)   Belted hip mobilizations with movement   AP mob with belt  Prone fig 4 stretch with mob    - Therex:   None today    ASSESSMENT:  Patient tolerated session well. She continues to have tightness in L hip especially in ER.  ROM slowly improving with manual work.   Pt continues to progress towards goals established in the POC and should continue with skilled therapy.      PLAN:  Continue to work on increasing hip flexion and ER flexibility. Work on progressing to return to run program in future appointments

## 2024-11-06 NOTE — PROGRESS NOTES
[facility]    PATIENT VISIT INFORMATION    Visit Type: Follow Up    Referring Provider: Mike Angel MD  Reason for referral: Leukopenia   Primary Practice Provider: Mike Angel MD    HEMATOLGOY HISTORY    38 year old female presents for evaluation of leukopenia. Noted over the last 7 months WBC 3.6-4.1. No other abnormality on CBC.   ~March 2024 hip surgery with slow progression to healing.   ~Covid 12/2020 and 7/2021.   Nerve block to help smell and taste return. Covid recovery clinic d/t long Covid symptoms. Follows pulmonary for management. Cleared by cardiology. HX PFO.   Colonoscopy 2016 d/t rectal bleeding. mild inflammation in rectum. Pathology negative.  Endometriosis diagnosis.   Right axillae lymph node decreasing in size. Appeared 11/2023.   Biopsy show: FRAGMENTS OF LYMPH NODE SHOWING FOLLICULAR HYPERPLASIA WITH NO MORPHOLOGIC EVIDENCE OF LYMPHOMA OR CARCINOMA, SEE NOTE.   Following Surgery most recent ultrasound of asked shows reduced in size lymphadenopathy.    CT chest abdomen pelvis w IV contrast  10/28/2024  IMPRESSION:  1. No lymphadenopathy in the chest abdomen or pelvis.  2. A 4.6 x 3.6 cm exophytic soft tissue density originating from the  right aspect of the uterine fundus with central necrosis. This likely  represents a necrotic fibroid, recommend further evaluation with a  pelvic ultrasound.  3. Similar featureless appearance of the pancreas which may represent  a normal variant. Correlation with laboratory values, serum IgG 4  levels if there is a clinical concern for autoimmune pancreatitis.  4. Incidental Finding: A subcentimeter hypodense right thyroid nodule  with peripheral calcification, likely benign. (**-YCF-**)  instructions:  If there are no clinical risk factors for thyroid  cancer, no further evaluation is recommended. (Managing Incidental  Thyroid Nodules Detected on Imaging: White Paper of the ACR  Incidental Thyroid Findings  Committee. Laura Rivera et al. Journal  of the American College of Radiology,Volume 12, Issue 2, 143 - 150.)  THYROID.ACR.IF.3  5. An indeterminate 3 mm solid noncalcified left lower lobe pulmonary  nodule. Instructions:  No further follow-up is required, however, if  the patient has high risk factors for primary lung malignancy,  follow-up noncontrast CT scan chest in 12 months may be obtained.  (Corbin Lunsford et al., Guidelines for management of incidental  pulmonary nodules detected on CT images: From the Fleischner Society  2017, Radiology. 2017 Jul;284 (1):228-243.) [email or web address]    US AXILLA ONLY RIGHT  10/18/2024  IMPRESSION:   There is no sonographic evidence of malignancy.   Follow up with ACR and NCCN guidelines.     HISTORY OF PRESENT ILLNESS     ID Statement: Christine Bustos is a 39 year old female     Chief Complaint: Follow Up labs    Interval History:   Patient presents for follow up for leukopenia.  She follows with gynecology recommended biopsy her uterus.   She reports night sweats monthly around time of cycle. LMP: 10/29/2024. Heavy to light towards end. Recent PAP.  History of HPV infection.  Very fatigued throughout the day. Appetite decent, no weight loss. Denies fevers, worsening SOB, CP, palpitations, abdominal pain, n/v/c. Has IBS so she has a lot of diarrhea. No urinary complaints. No rashes, sores. Cold hands and feet, finger tips go numb outside. Melasma on her face follows dermatology.   No neurological or breast complaints. Axilla lump right side. No other lumps or bumps.  No other complaints.  PAST/CURRENT HISTORY     MEDICAL/SURGICAL HISTORY  -leukopenia  -ARF from chicken pox childhood  -tonsillitis and tonsillectomy   -2015 ruptured ectopic pregnancy   -2016 ectopic pregnancy   -endometriosis   -lap surgery ablation endometriosis   -second endometrial surgery 2017   -removal fallopian tubes  -nasal surgery nose bleeds in childhood with heavy clots  -menorrhagia   -childhood  anemia on iron  -axillary lymph node poor blood flow, reducing     SOCIAL HISTORY  -Lives alone   -Work place: [employer]  and     -Tobacco/smokeless use: Denies   -Alcohol: Denies   -Illicit drug or marijuana use: Denies   -Protestant or Spiritual beliefs: Denies   -Social Determinates of Health Concerns: Denies     FAMILY HISTORY  -Unknown BIO mom (death CHF/drug use)  -Paternal Grandfather pancreatic cancer  -Paternal grandmother CHF  -Paternal first cousin Breast cousin double mastectomy in surveillance, blood issues unknown type  -Paternal-second cousin (daughter of first cousin with BC) leukemia at age of two in remission   -No other known history of hematologic, bleeding, clotting, autoimmune, genetic, or malignant disorders in the family.     OCCUPATIONAL/ENVIRONMENTAL HISTORY/EXPOSURES:  -None reported    Active Problems, Allergy List, Medication List, and PMH/PSH/FH/Social Hx have been reviewed and reconciled in chart. Updates made when necessary.     REVIEW OF SYSTEMS   A review of systems has been completed and are negative for complaints except what is stated in the assessment, HPI, IH, ROS, and/or past medical history.    ALLERGIES AND MEDICATIONS     Allergies and Intolerances:   Allergies   Allergen Reactions    Ciprofloxacin Other     IV only when with flagyl skin was burning and red, oral ok    Flagyl [Metronidazole] Other     With cipro with IV burning skin hot and red, oral ok    Motrin [Ibuprofen] Other     As a  child had acute renal failure      Medication Profile:   Current Outpatient Medications   Medication Instructions    albuterol 90 mcg/actuation inhaler 2 puffs, Every 4 hours PRN    fluticasone (Flonase) 50 mcg/actuation nasal spray 2 sprays, Each Nostril, Daily, Shake gently. Before first use, prime pump. After use, clean tip and replace cap.    Pulmicort Flexhaler 180 mcg/actuation inhaler 1 puff, 2 times daily      Available Vaccination Record:  "    There is no immunization history on file for this patient.   PHYSICAL EXAM     Vital Signs/Measurements:       8/1/2024     3:03 PM 10/9/2024     9:21 AM 10/18/2024     1:13 PM 10/18/2024     2:40 PM 10/24/2024    12:04 PM 10/24/2024     1:19 PM 10/31/2024    12:02 PM   Vitals   Systolic  119 123 117 115 106 119   Diastolic  79 72 68 79 64 82   Heart Rate  65 74 68 58 64 80   Temp  36.2 °C (97.2 °F) 36.8 °C (98.2 °F) 36.3 °C (97.3 °F) 36.1 °C (97 °F)     Resp  16 17 16 16 16    Height (in)  1.709 m (5' 7.28\")         Weight (lb) 120 119.93 124.01  123.02  123.4   BMI 18.25 kg/m2 18.63 kg/m2 19.26 kg/m2  19.11 kg/m2  19.16 kg/m2   BSA (m2) 1.62 m2 1.61 m2 1.63 m2  1.63 m2  1.63 m2   Visit Report Report Report    Report     Report       Significant value    Multiple values from one day are sorted in reverse-chronological order        Performance:   ECOG Performance Status: 0     Grade ECOG performance status   0 Fully active, able to carry on all pre-disease performance without restriction   1 Restricted in physically strenuous activity but ambulatory and able to carry out work of a light or sedentary nature, e.g., light housework, office work   2 Ambulatory and capable of all selfcare but unable to carry out any work activities; Up and about more than 50% of waking hours   3 Capable of only limited selfcare, confined to bed or chair more than 50% of waking hours   4 Completely disabled; Cannot carry out any selfcare; Totally confined to bed or chair   5 Dead     Physical Exam:  General: Patient is awake/alert/oriented x3, no distress, Nourished, hydrated, alert and cooperative, ambulating without difficulty  Skin: Expected color for ethnicity, good turgor, dry, no prominent lesions, rashes, unusual bruising, or bleeding   Hair: Normal texture and distribution   Nails: Normal color, no deformities    HEENT:   Head: Normocephalic, atraumatic, no visible masses, depressions, or scarring   Eyes: Conjunctiva clear, " sclera non-icteric, no exudates or hemorrhages   Ears: nl appearance, hearing intact    Nose: no external lesions, mucosa non-inflamed, no rhinorrhea   Mouth: Mucous membranes moist, no lesions, sores, bleeding, or erythema     Head/Neck: Neck supple, no apparent injury, thyroid without mass or tenderness, No JVD, trachea midline, no bruits appreciated    Respiratory/Thorax: Patent airways, CTAB, chest symmetry, normal inspiratory and expiratory effort    Cardiovascular: Regular rate and rhythm, no murmur or gallop, no carotid bruit or thrills    Gastrointestinal: Bowel sounds normal, non-distended, soft, no tenderness, no masses or hernia, or organomegaly appreciated    Genitourinary: deferred   Musculoskeletal: Normal gait, normal range of motion, no pain on palpation of spine, no deformity, normal strength for baseline, no atrophy, and no CVA tenderness appreciated   Extremities: No amputations or deformities, cyanosis, edema or viscosities, peripheral pulses intact   Neurological: Sensation present to touch, intact senses, motor response and reflexes normal, normal strength   Breast:    Lymphatic: No significant lymphadenopathy   Psychological: Intact recent and remote memory, judgement, and insight. Appropriate mood, affect, and behavior          RESULTS/DATA     Labs:     Lab Results   Component Value Date    WBC 4.4 10/09/2024    NEUTROABS 2.70 10/09/2024    IGABSOL 0.01 10/09/2024    LYMPHSABS 1.32 10/09/2024    MONOSABS 0.34 10/09/2024    EOSABS 0.06 10/09/2024    BASOSABS 0.01 10/09/2024    RBC 3.94 (L) 10/09/2024     10/09/2024    MCHC 30.6 (L) 10/09/2024    HGB 12.1 10/09/2024    HCT 39.5 10/09/2024     10/09/2024       Lab Results   Component Value Date    CREATININE 0.82 10/09/2024    BUN 14 10/09/2024    EGFR >90 10/09/2024     10/09/2024    K 4.1 10/09/2024     10/09/2024    CO2 25 10/09/2024      Lab Results   Component Value Date    ALT 8 10/09/2024    AST 12 10/09/2024     "ALKPHOS 56 10/09/2024    BILITOT 0.6 10/09/2024      Lab Results   Component Value Date    TSH 1.92 03/05/2024     Lab Results   Component Value Date    TSH 1.92 03/05/2024   Labs:  Lab Results   Component Value Date    WBC 4.4 10/09/2024    NEUTROABS 2.70 10/09/2024    IGABSOL 0.01 10/09/2024    LYMPHSABS 1.32 10/09/2024    MONOSABS 0.34 10/09/2024    EOSABS 0.06 10/09/2024    BASOSABS 0.01 10/09/2024    RBC 3.94 (L) 10/09/2024     10/09/2024    MCHC 30.6 (L) 10/09/2024    HGB 12.1 10/09/2024    HCT 39.5 10/09/2024     10/09/2024     Lab Results   Component Value Date    RETICCTPCT 2.1 (H) 10/09/2024      Lab Results   Component Value Date    CREATININE 0.82 10/09/2024    BUN 14 10/09/2024    EGFR >90 10/09/2024     10/09/2024    K 4.1 10/09/2024     10/09/2024    CO2 25 10/09/2024      Lab Results   Component Value Date    ALT 8 10/09/2024    AST 12 10/09/2024    ALKPHOS 56 10/09/2024    BILITOT 0.6 10/09/2024      Lab Results   Component Value Date    TSH 1.92 03/05/2024     Lab Results   Component Value Date    TSH 1.92 03/05/2024     Lab Results   Component Value Date    IRON 46 10/09/2024    TIBC 341 10/09/2024    FERRITIN 19 10/09/2024      Lab Results   Component Value Date    PRTMHYKQ72 364 10/09/2024      Lab Results   Component Value Date    FOLATE 13.3 10/09/2024     Lab Results   Component Value Date    SEDRATE 8 10/09/2024      Lab Results   Component Value Date    CRP <0.10 10/09/2024      No results found for: \"MICHAEL\"  Lab Results   Component Value Date     10/09/2024          Radiology/Studies:   US AXILLA ONLY RIGHT  4/19/2024  IMPRESSION: PROBABLY BENIGN - SHORT TERM INTERVAL FOLLOW-UP RECOMMENDED   Probable benign findings in the right axilla with continued appearance of   borderline-enlarged right axillary tail lymph nodes.  Minimally improved   since the previous study.   Continued surveillance for stability or resolution is recommended with   repeat right " axillary ultrasound within 6 months.   A follow-up ultrasound in 6 months is recommended to demonstrate   stability.   ASSESSMENT/PLAN     Assessment and Plan:   #1. Leukopenia   38 year old female presents for evaluation of leukopenia. Noted over the last 7 months WBC 3.6-4.1. No other abnormality on CBC.  Discussed leukopenia workup- possible causes including low nutritional values, infections or viruses, bone marrow suppression versus other etiology. Iron deficiency is noted will order IV iron.  Patient unable to tolerate oral iron.  Patient will follow with gynecology for fibroid and biopsy.  Iron deficiency improving we will reassess in 2 months and at order more iron as needed.  Most recent CBC shows a normal white count.    **Please follow with specialties as scheduled for other comorbidities and routine health screenings.**    I have reviewed the patient's medical record including provider notes, laboratory and testing results, imaging, and procedures available within the system and outside the system pertinent to patient care.     Follow up:    RTC:  -2 months with labs  -IV iron as needed    Medications:  -B12 500 mcg daily     Imaging/Testing:  -NA    Referral:  -Gyn     Other Pertinent Appointments:  -PT tomorrow  -Ortho 11/15/2024      Patient Discussion Summary:  Discussed plan of care in detail. Patient states understanding and in agreement. Answered all questions. They will call with any additional questions and/or concerns.    Thank you for allowing me to participate in your care. It was a pleasure meeting you.    Sincerely,  Yesenia Espitia, APRN-CNP       This document may have been written by voice recognition software.  There may be some incorrect wording, spelling and/or spelling errors or punctuation errors that were not corrected prior to committing the note to the medical record. Please request clarification if there is documentation error or clarification is needed.   Time based billing:  Please see documentation within this specific encounter.

## 2024-11-07 ENCOUNTER — TREATMENT (OUTPATIENT)
Dept: PHYSICAL THERAPY | Facility: CLINIC | Age: 39
End: 2024-11-07
Payer: COMMERCIAL

## 2024-11-07 DIAGNOSIS — M25.852 FEMOROACETABULAR IMPINGEMENT OF LEFT HIP: Primary | ICD-10-CM

## 2024-11-07 PROCEDURE — 97140 MANUAL THERAPY 1/> REGIONS: CPT | Mod: GP,CQ

## 2024-11-08 ENCOUNTER — TELEPHONE (OUTPATIENT)
Dept: HEMATOLOGY/ONCOLOGY | Facility: CLINIC | Age: 39
End: 2024-11-08

## 2024-11-11 ENCOUNTER — TREATMENT (OUTPATIENT)
Dept: PHYSICAL THERAPY | Facility: CLINIC | Age: 39
End: 2024-11-11
Payer: COMMERCIAL

## 2024-11-11 DIAGNOSIS — M25.852 FEMOROACETABULAR IMPINGEMENT OF LEFT HIP: ICD-10-CM

## 2024-11-11 PROCEDURE — 97140 MANUAL THERAPY 1/> REGIONS: CPT | Mod: GP | Performed by: PHYSICAL THERAPIST

## 2024-11-11 NOTE — PROGRESS NOTES
Physical Therapy Treatment    Patient Name: Christine Bustos  MRN: [id number]  Today's Date: 11/11/2024  Visit: 59/MN  Manual therapy [id number] 40 mins  Time in 3:15p Time out 4:00p    Diagnosis:   1. Femoroacetabular impingement of left hip  Follow Up In Physical Therapy          PRECAUTIONS:  Left hip arthroscopy 3/26/24    SUBJECTIVE:  Patient reports that  she has relief with needling but still having stiffness in left hip.   HEP performed? Y  Pain: 2/10 anterior left hip     OBJECTIVE:  Left hip strength: hip flexion 4-/5, abd 4-/5, ER 4-/5 (in available ROM)    TREATMENT:    - Manual Therapy:  FDN performed by Salvador Mcdonnell PT to L adductor and L lateral glut and left rectus femoris  STM/DTM to R hip adductor, rectus, hip flexor and posterior/lateral hip  PROM hip flexion, IR and ER (within precautions)   Belted hip mobilizations with movement   AP mob with belt  Prone contract relax IR and ER    - Therex:   None today    ASSESSMENT:  Verbal consent obtained for trigger point dry needling technique with medications reviewed, risks and benefits explained, and procedure reviewed. Dry needling initiated today per tx section with no adverse reaction from the patient.   Manual therapies utilized to improve hip flexion and ER PROM end range with ER ROM improving and no pain reported p therapy.       PLAN:  Continue to work on increasing hip flexion and ER flexibility. Work on progressing to return to run program in future appointments

## 2024-11-13 ENCOUNTER — APPOINTMENT (OUTPATIENT)
Dept: HEMATOLOGY/ONCOLOGY | Facility: CLINIC | Age: 39
End: 2024-11-13
Payer: COMMERCIAL

## 2024-11-14 ENCOUNTER — TREATMENT (OUTPATIENT)
Dept: PHYSICAL THERAPY | Facility: CLINIC | Age: 39
End: 2024-11-14
Payer: COMMERCIAL

## 2024-11-14 DIAGNOSIS — M25.852 FEMOROACETABULAR IMPINGEMENT OF LEFT HIP: ICD-10-CM

## 2024-11-14 PROCEDURE — 97110 THERAPEUTIC EXERCISES: CPT | Mod: GP

## 2024-11-14 PROCEDURE — 97140 MANUAL THERAPY 1/> REGIONS: CPT | Mod: GP

## 2024-11-14 NOTE — PROGRESS NOTES
Physical Therapy Treatment    Patient Name: Christine Bustos  MRN: [id number]  Today's Date: 11/14/2024  Visit: 60/MN      Diagnosis:   1. Femoroacetabular impingement of left hip  Follow Up In Physical Therapy          PRECAUTIONS:  Left hip arthroscopy 3/26/24    SUBJECTIVE:  Patient reports that she has only been having some hip discomfort after she performs banded side steps. Besides that she has been feeling good   HEP performed? Y  Pain: 2/10 anterior left hip     OBJECTIVE:  Left hip strength: hip flexion 4-/5, abd 4-/5, ER 4-/5 (in available ROM)    TREATMENT:    - Manual Therapy:  STM/DTM to R hip adductor, rectus, hip flexor and posterior/lateral hip  PROM hip flexion, IR and ER (within precautions)   Belted hip mobilizations with movement   AP mob with belt  Prone contract relax IR and ER    - Therex:   Supine hip flexion isometric 5 x 60 sec on/off     ASSESSMENT:  Pt tolerated session well. Pt appeared to have less discomfort when STM was performed. Pt tolerated hip flexor isometric well without any increase in anterior hip discomfort. Pt continues to progress towards goals established in the POC and should continue with skilled therapy.        PLAN:  Continue to work on increasing hip flexion and ER flexibility. Work on progressing to return to run program in future appointments

## 2024-11-15 ENCOUNTER — APPOINTMENT (OUTPATIENT)
Dept: OBSTETRICS AND GYNECOLOGY | Facility: CLINIC | Age: 39
End: 2024-11-15
Payer: COMMERCIAL

## 2024-11-15 ENCOUNTER — APPOINTMENT (OUTPATIENT)
Dept: ORTHOPEDIC SURGERY | Facility: HOSPITAL | Age: 39
End: 2024-11-15
Payer: COMMERCIAL

## 2024-11-18 ENCOUNTER — OFFICE VISIT (OUTPATIENT)
Dept: ORTHOPEDIC SURGERY | Facility: HOSPITAL | Age: 39
End: 2024-11-18
Payer: COMMERCIAL

## 2024-11-18 DIAGNOSIS — S73.192D TEAR OF LEFT ACETABULAR LABRUM, SUBSEQUENT ENCOUNTER: Primary | ICD-10-CM

## 2024-11-18 DIAGNOSIS — M25.852 FEMOROACETABULAR IMPINGEMENT OF LEFT HIP: ICD-10-CM

## 2024-11-18 PROCEDURE — 99213 OFFICE O/P EST LOW 20 MIN: CPT | Performed by: SPECIALIST/TECHNOLOGIST

## 2024-11-18 PROCEDURE — 1036F TOBACCO NON-USER: CPT | Performed by: SPECIALIST/TECHNOLOGIST

## 2024-11-19 ENCOUNTER — TREATMENT (OUTPATIENT)
Dept: PHYSICAL THERAPY | Facility: CLINIC | Age: 39
End: 2024-11-19
Payer: COMMERCIAL

## 2024-11-19 DIAGNOSIS — M25.852 FEMOROACETABULAR IMPINGEMENT OF LEFT HIP: ICD-10-CM

## 2024-11-19 DIAGNOSIS — S73.192D TEAR OF LEFT ACETABULAR LABRUM, SUBSEQUENT ENCOUNTER: Primary | ICD-10-CM

## 2024-11-19 PROCEDURE — 97140 MANUAL THERAPY 1/> REGIONS: CPT | Mod: GP

## 2024-11-19 PROCEDURE — 97110 THERAPEUTIC EXERCISES: CPT | Mod: GP

## 2024-11-19 PROCEDURE — 97032 APPL MODALITY 1+ESTIM EA 15: CPT | Mod: GP

## 2024-11-19 NOTE — PROGRESS NOTES
The patient returns 34  weeks out from their Left hip arthroscopy on 3/26/2024.  Overall, she is doing okay.  She continues to report a tightness over the anterior hip that worsens with hip flexion and crossing her leg.  She continues with formal physical therapy, now at our [facility] location, and denies adverse events or issues since her last visit.  She continues to work on her strengthening.  She has noted progression of her strength and stability in the gym.  She now is focusing on single-leg workouts at the gym.  She still has weakness and fatigues quickly.    She has done a nice job with her range of motion.  It is symmetric to her nonoperative side.  She lacks a couple degrees of internal rotation but it is pain-free.  She has tenderness to palpation over the left hip flexor musculature.  We discussed the use of Voltaren gel placed topically 4 times a day over the anterior aspect of her left hip.  She had an acute kidney injury as a child and states that she has an allergy to Motrin and has taken anti-inflammatory medications in the past.  I encouraged her to ice her hip 15 to 20 minutes at a time 2-3 times per day.  She will continue with formal physical therapy.  She will follow-up in 8 weeks for clinical recheck.  She is in agreement the plan.  Her questions are answered.       [place] PA-C

## 2024-11-19 NOTE — PROGRESS NOTES
Physical Therapy Treatment    Patient Name: Christine Bustos  MRN: [id number]  Today's Date: 11/19/2024  Visit: 60/MN      Diagnosis:   1. Tear of left acetabular labrum, subsequent encounter        2. Femoroacetabular impingement of left hip  Follow Up In Physical Therapy          PRECAUTIONS:  Left hip arthroscopy 3/26/24    SUBJECTIVE:  Patient reports that she has only been having some hip discomfort after she performs banded side steps. Besides that she has been feeling good   HEP performed? Y  Pain: 2/10 anterior left hip     OBJECTIVE:  Left hip strength: hip flexion 4-/5, abd 4-/5, ER 4-/5 (in available ROM)    TREATMENT:    - Manual Therapy:  STM/DTM to R hip adductor, rectus, hip flexor and posterior/lateral hip  PROM hip flexion, IR and ER (within precautions)   Belted hip mobilizations with movement   AP mob with belt  Prone contract relax IR and ER    - Therex:   Side plank hold 3 x 30 sec   Front plank hold 2 x 30 sec     Thermaprobe to L adductor with heat and stim x 10 mins @intensity 6     ASSESSMENT:  Pt tolerated session well. Pt tolerated use of thermaprobe very well with a decrease in tightness after it was used and STM was performed. Pt was able to continue to progress gluteal strengthening with side plank. Pt continues to progress towards goals established in the POC and should continue with skilled therapy.          PLAN:  Continue to work on increasing hip flexion and ER flexibility. Work on progressing to return to run program in future appointments

## 2024-11-21 ENCOUNTER — TREATMENT (OUTPATIENT)
Dept: PHYSICAL THERAPY | Facility: CLINIC | Age: 39
End: 2024-11-21
Payer: COMMERCIAL

## 2024-11-21 DIAGNOSIS — M25.852 FEMOROACETABULAR IMPINGEMENT OF LEFT HIP: ICD-10-CM

## 2024-11-21 PROCEDURE — 97140 MANUAL THERAPY 1/> REGIONS: CPT | Mod: GP

## 2024-11-21 PROCEDURE — 97110 THERAPEUTIC EXERCISES: CPT | Mod: GP

## 2024-11-21 NOTE — PROGRESS NOTES
Physical Therapy Treatment    Patient Name: Christine Bustos  MRN: [id number]  Today's Date: 11/21/2024  Visit: 61/MN      Diagnosis:   1. Femoroacetabular impingement of left hip  Follow Up In Physical Therapy          PRECAUTIONS:  Left hip arthroscopy 3/26/24    SUBJECTIVE:  Patient reports that she felt some relief after last session and using thermaprobe. Pt   HEP performed? Y  Pain: 2/10 anterior left hip     OBJECTIVE:  Left hip strength: hip flexion 4-/5, abd 4-/5, ER 4-/5 (in available ROM)    TREATMENT:    - Manual Therapy:  FDN performed to lateral gluteal musculature of L hip with .[id number] needles   STM/DTM to R hip adductor, rectus, hip flexor and posterior/lateral hip  PROM hip flexion, IR and ER (within precautions)   Belted hip mobilizations with movement   AP mob with belt      - Therex:   BL leg press 2 x 15 (95#)   SL leg press (95 on R, still unable to get full rep on L) 1 x 10   SL eccentric leg press on L LE 1 x 5 @ 95  Pt education on leg press progression at gym     ASSESSMENT:  Pt tolerated session well. Pt continued to tolerated FDN well. Pt still showed visible weakness in L LE when performing SL leg press. She was educated to begin to perform more SL eccentric leg press under a heavier load on her L LE. Pt continues to progress towards goals established in the POC and should continue with skilled therapy.            PLAN:  Continue to work on increasing hip flexion and ER flexibility. Work on progressing to return to run program in future appointments

## 2024-11-25 ENCOUNTER — TREATMENT (OUTPATIENT)
Dept: PHYSICAL THERAPY | Facility: CLINIC | Age: 39
End: 2024-11-25
Payer: COMMERCIAL

## 2024-11-25 DIAGNOSIS — M25.852 FEMOROACETABULAR IMPINGEMENT OF LEFT HIP: ICD-10-CM

## 2024-11-25 PROCEDURE — 97110 THERAPEUTIC EXERCISES: CPT | Mod: GP | Performed by: PHYSICAL THERAPIST

## 2024-11-25 PROCEDURE — 97140 MANUAL THERAPY 1/> REGIONS: CPT | Mod: GP | Performed by: PHYSICAL THERAPIST

## 2024-11-25 NOTE — PROGRESS NOTES
"  Physical Therapy Treatment    Patient Name: Christine Bustos  MRN: [id number]  Today's Date: 11/25/2024  Visit: 61/MN      Diagnosis:   1. Femoroacetabular impingement of left hip  Follow Up In Physical Therapy          PRECAUTIONS:  Left hip arthroscopy 3/26/24    SUBJECTIVE:  Patient reports p last session she had some more pain. Increased tightness and pain lying on L side in lateral hip.   She did perform gym lower body strength.   HEP performed? Y  Pain: 2/10 anterior left hip     OBJECTIVE:  Left hip strength: hip flexion 4-/5, abd 4-/5, ER 4-/5 (in available ROM)    TREATMENT:    - Manual Therapy:  FDN performed to lateral gluteal musculature of L hip with .[id number] needles   STM/DTM to R hip adductor, rectus, hip flexor  PROM hip flexion, IR and ER (within precautions)   Held mobs - perform next visit  Belted hip mobilizations with movement   AP mob with belt      - Therex:   SL bridge on ball 15 x 5\"  SL eccentric shuttle 50 lbs. 3 sets to fatigue  SL TRX eccentric squat 3 sets  Reverse lunge 3 sets with support  Pt education on leg press progression at gym     ASSESSMENT:  Pt demonstrates significant weakness with single leg activities with compensations to complete closed chain singl eleg movements. Pt education and cuing on proper glute and hip activation. Updated home program and movement patterns.       PLAN:  Continue to work on increasing hip flexion and ER flexibility. Focus on single leg strength and make sure she tests strong enough prior to initiating hopping.   "

## 2024-12-03 ENCOUNTER — TREATMENT (OUTPATIENT)
Dept: PHYSICAL THERAPY | Facility: CLINIC | Age: 39
End: 2024-12-03
Payer: COMMERCIAL

## 2024-12-03 DIAGNOSIS — M25.852 FEMOROACETABULAR IMPINGEMENT OF LEFT HIP: ICD-10-CM

## 2024-12-03 PROCEDURE — 97140 MANUAL THERAPY 1/> REGIONS: CPT | Mod: GP | Performed by: PHYSICAL THERAPIST

## 2024-12-03 NOTE — PROGRESS NOTES
"    Patient Name: Christine Bustos  MRN: [id number]  Today's Date: 12/3/2024  Time Calculation  Start Time: 0940  Stop Time: 1020  Time Calculation (min): 40 min  PT Therapeutic Procedures Time Entry  Manual Therapy Time Entry: 38        Visit: 62/MN    Diagnosis:   1. Femoroacetabular impingement of left hip  Follow Up In Physical Therapy          PRECAUTIONS:  Left hip arthroscopy 3/26/24    SUBJECTIVE:  Pt reports prolonged sitting and driving is painful.   HEP performed? Y  Pain: 2/10 anterior left hip     OBJECTIVE:  Left hip strength: hip flexion 4-/5, abd 4-/5, ER 4-/5 (in available ROM)    TREATMENT:    - Manual Therapy:  STM/DTM to R hip adductor, rectus, hip flexor, and lateral hip musculature  PROM hip flexion, IR and ER (within precautions)   Belted hip mobilizations with movement   AP mob with belt  Caudal glides  Prone IR ER contract relax      - Therex:  - held today 12/3/24  SL bridge on ball 15 x 5\"  SL eccentric shuttle 50 lbs. 3 sets to fatigue  SL TRX eccentric squat 3 sets  Reverse lunge 3 sets with support  Pt education on leg press progression at gym     ASSESSMENT:  Focused on manual therapies to decrease irritability today. Pt educated to complete home program strengthening. Resume strength next visit.     PLAN:  Continue to work on increasing hip flexion and ER flexibility. Focus on single leg strength and make sure she tests strong enough prior to initiating hopping.   "

## 2024-12-05 ENCOUNTER — APPOINTMENT (OUTPATIENT)
Dept: PHYSICAL THERAPY | Facility: CLINIC | Age: 39
End: 2024-12-05
Payer: COMMERCIAL

## 2024-12-09 NOTE — PROGRESS NOTES
"    Patient Name: Christine Bustos  MRN: [id number]  Today's Date: 12/10/2024  Time Calculation  Start Time: 0915  Stop Time: 1000  Time Calculation (min): 45 min  PT Therapeutic Procedures Time Entry  Manual Therapy Time Entry: 40        Visit: 63/MN    Diagnosis:   1. Left hip pain        2. Femoroacetabular impingement of left hip  Follow Up In Physical Therapy          PRECAUTIONS:  Left hip arthroscopy 3/26/24    SUBJECTIVE:  Pt reports she has tenderness in anterior hip for the past 2 days but not sure what from.   HEP performed? Y  Pain: 4/10 anterior left hip     OBJECTIVE:  Left hip strength: hip flexion 4-/5, abd 4-/5, ER 4-/5 (in available ROM)    TREATMENT:    - Manual Therapy:  STM/DTM to R hip adductor, rectus, hip flexor, and lateral hip musculature  PROM hip flexion, IR and ER (within precautions)   Belted hip mobilizations with movement   AP mob with belt  Caudal glides  Prone quad stretch      - Therex:  - held today 12/10/24  SL bridge on ball 15 x 5\"  SL eccentric shuttle 50 lbs. 3 sets to fatigue  SL TRX eccentric squat 3 sets  Reverse lunge 3 sets with support  Pt education on leg press progression at gym     ASSESSMENT:  Focused on manual therapies to decrease irritability today. Discussed holding strengthening for the next couple of day to assess symptoms.  Possible scar tissue tear??? causing increased tenderness.  PLAN:  Continue to work on increasing hip flexion and ER flexibility. Focus on single leg strength and make sure she tests strong enough prior to initiating hopping.   "

## 2024-12-10 ENCOUNTER — TREATMENT (OUTPATIENT)
Dept: PHYSICAL THERAPY | Facility: CLINIC | Age: 39
End: 2024-12-10
Payer: COMMERCIAL

## 2024-12-10 ENCOUNTER — LAB (OUTPATIENT)
Dept: LAB | Facility: LAB | Age: 39
End: 2024-12-10
Payer: COMMERCIAL

## 2024-12-10 DIAGNOSIS — D72.819 LEUKOPENIA, UNSPECIFIED TYPE: ICD-10-CM

## 2024-12-10 DIAGNOSIS — M25.852 FEMOROACETABULAR IMPINGEMENT OF LEFT HIP: ICD-10-CM

## 2024-12-10 DIAGNOSIS — M25.552 LEFT HIP PAIN: Primary | ICD-10-CM

## 2024-12-10 LAB
BASOPHILS # BLD AUTO: 0.03 X10*3/UL (ref 0–0.1)
BASOPHILS NFR BLD AUTO: 0.6 %
EOSINOPHIL # BLD AUTO: 0.1 X10*3/UL (ref 0–0.7)
EOSINOPHIL NFR BLD AUTO: 1.9 %
ERYTHROCYTE [DISTWIDTH] IN BLOOD BY AUTOMATED COUNT: 13.2 % (ref 11.5–14.5)
FERRITIN SERPL-MCNC: 177 NG/ML (ref 8–150)
HCT VFR BLD AUTO: 40.7 % (ref 36–46)
HGB BLD-MCNC: 13.4 G/DL (ref 12–16)
IMM GRANULOCYTES # BLD AUTO: 0.02 X10*3/UL (ref 0–0.7)
IMM GRANULOCYTES NFR BLD AUTO: 0.4 % (ref 0–0.9)
IRON SATN MFR SERPL: 66 % (ref 25–45)
IRON SERPL-MCNC: 173 UG/DL (ref 35–150)
LYMPHOCYTES # BLD AUTO: 1.41 X10*3/UL (ref 1.2–4.8)
LYMPHOCYTES NFR BLD AUTO: 26.6 %
MCH RBC QN AUTO: 32.5 PG (ref 26–34)
MCHC RBC AUTO-ENTMCNC: 32.9 G/DL (ref 32–36)
MCV RBC AUTO: 99 FL (ref 80–100)
MONOCYTES # BLD AUTO: 0.46 X10*3/UL (ref 0.1–1)
MONOCYTES NFR BLD AUTO: 8.7 %
NEUTROPHILS # BLD AUTO: 3.29 X10*3/UL (ref 1.2–7.7)
NEUTROPHILS NFR BLD AUTO: 61.8 %
NRBC BLD-RTO: 0 /100 WBCS (ref 0–0)
PLATELET # BLD AUTO: 190 X10*3/UL (ref 150–450)
RBC # BLD AUTO: 4.12 X10*6/UL (ref 4–5.2)
TIBC SERPL-MCNC: 264 UG/DL (ref 240–445)
UIBC SERPL-MCNC: 91 UG/DL (ref 110–370)
WBC # BLD AUTO: 5.3 X10*3/UL (ref 4.4–11.3)

## 2024-12-10 PROCEDURE — 83550 IRON BINDING TEST: CPT

## 2024-12-10 PROCEDURE — 82728 ASSAY OF FERRITIN: CPT

## 2024-12-10 PROCEDURE — 36415 COLL VENOUS BLD VENIPUNCTURE: CPT

## 2024-12-10 PROCEDURE — 85025 COMPLETE CBC W/AUTO DIFF WBC: CPT

## 2024-12-10 PROCEDURE — 83540 ASSAY OF IRON: CPT

## 2024-12-10 PROCEDURE — 97140 MANUAL THERAPY 1/> REGIONS: CPT | Mod: GP,CQ

## 2024-12-10 PROCEDURE — 86038 ANTINUCLEAR ANTIBODIES: CPT

## 2024-12-11 ENCOUNTER — APPOINTMENT (OUTPATIENT)
Dept: OBSTETRICS AND GYNECOLOGY | Facility: CLINIC | Age: 39
End: 2024-12-11
Payer: COMMERCIAL

## 2024-12-11 LAB — ANA SER QL HEP2 SUBST: NEGATIVE

## 2024-12-11 NOTE — PROGRESS NOTES
Patient Name: Christine Bustos  MRN: [id number]  Today's Date: 12/12/2024  Time Calculation  Start Time: 1130  Stop Time: 1215  Time Calculation (min): 45 min  PT Therapeutic Procedures Time Entry  Manual Therapy Time Entry: 23  Therapeutic Exercise Time Entry: 10  PT Modalities Time Entry  E-Stim (Attended) Time Entry: 6     Visit: 64/MN    Diagnosis:   1. Left hip pain        2. Femoroacetabular impingement of left hip  Follow Up In Physical Therapy          PRECAUTIONS:  Left hip arthroscopy 3/26/24    SUBJECTIVE:  Pt reports she still has tenderness of HF with some swelling and thinks its bruised.  HEP performed? Y  Pain: 4/10 anterior left hip     OBJECTIVE:  Left hip strength: hip flexion 4-/5, abd 4-/5, ER 4-/5 (in available ROM)    TREATMENT:    - Manual Therapy:  STM/DTM to R hip adductor, rectus, hip flexor, and lateral hip musculature      Thermaprobe to L HF with cold x 6 min   Ktape to KF       - Therex:  -  Bridges 2 x 10   Mini squats 2 x 10     ASSESSMENT:  Patient continues to be very tender over HF.  Tolerated thermaprobe with cold for 6 min but became achy with it so stopped.  If she responded well and has less pain maybe perform with decreased cold on probe. Continue to work on increasing hip flexion and ER flexibility. Focus on single leg strength and make sure she tests strong enough prior to initiating hopping.

## 2024-12-12 ENCOUNTER — TREATMENT (OUTPATIENT)
Dept: PHYSICAL THERAPY | Facility: CLINIC | Age: 39
End: 2024-12-12
Payer: COMMERCIAL

## 2024-12-12 DIAGNOSIS — M25.852 FEMOROACETABULAR IMPINGEMENT OF LEFT HIP: ICD-10-CM

## 2024-12-12 DIAGNOSIS — M25.552 LEFT HIP PAIN: Primary | ICD-10-CM

## 2024-12-12 PROCEDURE — 97140 MANUAL THERAPY 1/> REGIONS: CPT | Mod: GP,CQ

## 2024-12-12 PROCEDURE — 97110 THERAPEUTIC EXERCISES: CPT | Mod: GP,CQ

## 2024-12-16 ENCOUNTER — TREATMENT (OUTPATIENT)
Dept: PHYSICAL THERAPY | Facility: CLINIC | Age: 39
End: 2024-12-16
Payer: COMMERCIAL

## 2024-12-16 DIAGNOSIS — M25.852 FEMOROACETABULAR IMPINGEMENT OF LEFT HIP: ICD-10-CM

## 2024-12-16 PROCEDURE — 97032 APPL MODALITY 1+ESTIM EA 15: CPT | Mod: GP | Performed by: PHYSICAL THERAPIST

## 2024-12-16 PROCEDURE — 97140 MANUAL THERAPY 1/> REGIONS: CPT | Mod: GP | Performed by: PHYSICAL THERAPIST

## 2024-12-16 NOTE — PROGRESS NOTES
Patient Name: Christine Bustos  MRN: [id number]  Today's Date: 12/16/2024  Time Calculation  Start Time: 1515  Stop Time: 1600  Time Calculation (min): 45 min  PT Therapeutic Procedures Time Entry  Manual Therapy Time Entry: 30  PT Modalities Time Entry  E-Stim (Attended) Time Entry: 14     Visit: 65/MN    Diagnosis:   1. Femoroacetabular impingement of left hip  Follow Up In Physical Therapy          PRECAUTIONS:  Left hip arthroscopy 3/26/24    SUBJECTIVE:  Pt reports her hip flexor symptoms are improving. The tape helped and she left it on. Reports not as tender to touch. Has help home program.     HEP performed? Y  Pain: 4/10 anterior left hip     OBJECTIVE:  Left hip strength: hip flexion 4-/5, abd 4-/5, ER 4-/5 (in available ROM)    TREATMENT:    - Manual Therapy:  STM/DTM to R hip adductor, rectus, hip flexor, and lateral hip musculature      Thermaprobe to L HF with cold x 6 min   Ktape to KF       - Therex:  -  Bridges 2 x 10   Mini squats 2 x 10     ASSESSMENT:  Patient continues to be very tender over HF.  Tolerated thermaprobe with cold for 6 min but became achy with it so stopped.  If she responded well and has less pain maybe perform with decreased cold on probe. Continue to work on increasing hip flexion and ER flexibility. Focus on single leg strength and make sure she tests strong enough prior to initiating hopping.   PLAN:  Assess response to care. If symptoms have improved resume some strength and flexibility next session.

## 2024-12-18 ENCOUNTER — TELEMEDICINE (OUTPATIENT)
Dept: HEMATOLOGY/ONCOLOGY | Facility: CLINIC | Age: 39
End: 2024-12-18
Payer: COMMERCIAL

## 2024-12-18 DIAGNOSIS — D72.819 LEUKOPENIA, UNSPECIFIED TYPE: ICD-10-CM

## 2024-12-18 PROCEDURE — 99214 OFFICE O/P EST MOD 30 MIN: CPT

## 2024-12-18 NOTE — PROGRESS NOTES
[facility]    PATIENT VISIT INFORMATION    Visit Type: Follow Up    Referring Provider: Mike Angel MD  Reason for referral: Leukopenia   Primary Practice Provider: Mike Angel MD    HEMATOLGOY HISTORY    39 year old female presents for evaluation of leukopenia. Noted over the last 7 months WBC 3.6-4.1. No other abnormality on CBC.   ~March 2024 hip surgery with slow progression to healing.   ~Covid 12/2020 and 7/2021.   Nerve block to help smell and taste return. Covid recovery clinic d/t long Covid symptoms. Follows pulmonary for management. Cleared by cardiology. HX PFO.   Colonoscopy 2016 d/t rectal bleeding. mild inflammation in rectum. Pathology negative.  Endometriosis diagnosis.   Right axillae lymph node decreasing in size. Appeared 11/2023.   Biopsy show: FRAGMENTS OF LYMPH NODE SHOWING FOLLICULAR HYPERPLASIA WITH NO MORPHOLOGIC EVIDENCE OF LYMPHOMA OR CARCINOMA, SEE NOTE.   Following Surgery most recent ultrasound of asked shows reduced in size lymphadenopathy.    CT chest abdomen pelvis w IV contrast  10/28/2024  IMPRESSION:  1. No lymphadenopathy in the chest abdomen or pelvis.  2. A 4.6 x 3.6 cm exophytic soft tissue density originating from the  right aspect of the uterine fundus with central necrosis. This likely  represents a necrotic fibroid, recommend further evaluation with a  pelvic ultrasound.  3. Similar featureless appearance of the pancreas which may represent  a normal variant. Correlation with laboratory values, serum IgG 4  levels if there is a clinical concern for autoimmune pancreatitis.  4. Incidental Finding: A subcentimeter hypodense right thyroid nodule  with peripheral calcification, likely benign. (**-[id number]**)  instructions:  If there are no clinical risk factors for thyroid  cancer, no further evaluation is recommended. (Managing Incidental  Thyroid Nodules Detected on Imaging: White Paper of the ACR  Incidental Thyroid Findings  Committee. Laura Rivera al. Journal  of the American College of Radiology,Volume 12, Issue 2, 143 - 150.)  THYROID.ACR.IF.3  5. An indeterminate 3 mm solid noncalcified left lower lobe pulmonary  nodule. Instructions:  No further follow-up is required, however, if  the patient has high risk factors for primary lung malignancy,  follow-up noncontrast CT scan chest in 12 months may be obtained.  (Corbin Lunsford et al., Guidelines for management of incidental  pulmonary nodules detected on CT images: From the Fleischner Society  2017, Radiology. 2017 Jul;284 (1):228-243.) [email or web address]    US AXILLA ONLY RIGHT  10/18/2024  IMPRESSION:   There is no sonographic evidence of malignancy.   Follow up with ACR and NCCN guidelines.     HISTORY OF PRESENT ILLNESS     ID Statement: Christine Bustos is a 39 year old female     Chief Complaint: Follow Up labs    Interval History:   Patient presents for follow up for leukopenia.  Sick today. Tests pending. Given steroids and Antibiotic.     She follows with gynecology recommended biopsy her uterus. New appointment in January 2025.     She reports night sweats monthly around time of cycle. LMP: 10/29/2024. Heavy to light towards end. Recent PAP.  History of HPV infection.  Very fatigued throughout the day. Appetite decent, no weight loss. Denies fevers, worsening SOB, CP, palpitations, abdominal pain, n/v/c. Has IBS so she has a lot of diarrhea. No urinary complaints. No rashes, sores. Cold hands and feet, finger tips go numb outside. Melasma on her face follows dermatology.   No neurological or breast complaints. Axilla lump right side. No other lumps or bumps.  No other complaints.    Improved energy after iron infusion 10/18 and 10/25.     PAST/CURRENT HISTORY     MEDICAL/SURGICAL HISTORY  -leukopenia  -ARF from chicken pox childhood  -tonsillitis and tonsillectomy   -2015 ruptured ectopic pregnancy   -2016 ectopic pregnancy   -endometriosis   -lap surgery ablation  endometriosis   -second endometrial surgery 2017   -removal fallopian tubes  -nasal surgery nose bleeds in childhood with heavy clots  -menorrhagia   -childhood anemia on iron  -axillary lymph node poor blood flow, reducing     SOCIAL HISTORY  -Lives alone   -Work place: [employer]  and     -Tobacco/smokeless use: Denies   -Alcohol: Denies   -Illicit drug or marijuana use: Denies   -Hindu or Spiritual beliefs: Denies   -Social Determinates of Health Concerns: Denies     FAMILY HISTORY  -Unknown BIO mom (death CHF/drug use)  -Paternal Grandfather pancreatic cancer  -Paternal grandmother CHF  -Paternal first cousin Breast cousin double mastectomy in surveillance, blood issues unknown type  -Paternal-second cousin (daughter of first cousin with BC) leukemia at age of two in remission   -No other known history of hematologic, bleeding, clotting, autoimmune, genetic, or malignant disorders in the family.     OCCUPATIONAL/ENVIRONMENTAL HISTORY/EXPOSURES:  -None reported    Active Problems, Allergy List, Medication List, and PMH/PSH/FH/Social Hx have been reviewed and reconciled in chart. Updates made when necessary.     REVIEW OF SYSTEMS   A review of systems has been completed and are negative for complaints except what is stated in the assessment, HPI, IH, ROS, and/or past medical history.    ALLERGIES AND MEDICATIONS     Allergies and Intolerances:   Allergies   Allergen Reactions    Ciprofloxacin Other     IV only when with flagyl skin was burning and red, oral ok    Flagyl [Metronidazole] Other     With cipro with IV burning skin hot and red, oral ok    Motrin [Ibuprofen] Other     As a  child had acute renal failure      Medication Profile:   Current Outpatient Medications   Medication Instructions    albuterol 90 mcg/actuation inhaler 2 puffs, Every 4 hours PRN    fluticasone (Flonase) 50 mcg/actuation nasal spray 2 sprays, Each Nostril, Daily, Shake gently. Before first  "use, prime pump. After use, clean tip and replace cap.    Pulmicort Flexhaler 180 mcg/actuation inhaler 1 puff, 2 times daily      Available Vaccination Record:     There is no immunization history on file for this patient.   PHYSICAL EXAM     Vital Signs/Measurements:       10/9/2024     9:21 AM 10/18/2024     1:13 PM 10/18/2024     2:40 PM 10/24/2024    12:04 PM 10/24/2024     1:19 PM 10/31/2024    12:02 PM 11/6/2024     3:33 PM   Vitals   Systolic 119 123 117 115 106 119 112   Diastolic 79 72 68 79 64 82 77   BP Location Left arm Right arm Right arm Right arm   Left arm   Heart Rate 65 74 68 58 64 80 59   Temp 36.2 °C (97.2 °F) 36.8 °C (98.2 °F) 36.3 °C (97.3 °F) 36.1 °C (97 °F)   36.4 °C (97.5 °F)   Resp 16 17 16 16 16  17   Height 1.709 m (5' 7.28\")          Weight (lb) 119.93 124.01  123.02  123.4 123.68   BMI 18.63 kg/m2 19.26 kg/m2  19.11 kg/m2  19.16 kg/m2 19.21 kg/m2   BSA (m2) 1.61 m2 1.63 m2  1.63 m2  1.63 m2 1.63 m2   Visit Report Report    Report     Report Report       Significant value    Multiple values from one day are sorted in reverse-chronological order        Performance:   ECOG Performance Status: 0     Grade ECOG performance status   0 Fully active, able to carry on all pre-disease performance without restriction   1 Restricted in physically strenuous activity but ambulatory and able to carry out work of a light or sedentary nature, e.g., light housework, office work   2 Ambulatory and capable of all selfcare but unable to carry out any work activities; Up and about more than 50% of waking hours   3 Capable of only limited selfcare, confined to bed or chair more than 50% of waking hours   4 Completely disabled; Cannot carry out any selfcare; Totally confined to bed or chair   5 Dead     Physical Exam:  General: Patient is awake/alert/oriented x3, no distress, appears ill   Psychological: Intact recent and remote memory, judgement, and insight. Appropriate mood, affect, and behavior "     RESULTS/DATA     Labs:     Lab Results   Component Value Date    WBC 5.3 12/10/2024    NEUTROABS 3.29 12/10/2024    IGABSOL 0.02 12/10/2024    LYMPHSABS 1.41 12/10/2024    MONOSABS 0.46 12/10/2024    EOSABS 0.10 12/10/2024    BASOSABS 0.03 12/10/2024    RBC 4.12 12/10/2024    MCV 99 12/10/2024    MCHC 32.9 12/10/2024    HGB 13.4 12/10/2024    HCT 40.7 12/10/2024     12/10/2024       Lab Results   Component Value Date    CREATININE 0.82 10/09/2024    BUN 14 10/09/2024    EGFR >90 10/09/2024     10/09/2024    K 4.1 10/09/2024     10/09/2024    CO2 25 10/09/2024      Lab Results   Component Value Date    ALT 8 10/09/2024    AST 12 10/09/2024    ALKPHOS 56 10/09/2024    BILITOT 0.6 10/09/2024      Lab Results   Component Value Date    TSH 1.92 03/05/2024     Lab Results   Component Value Date    TSH 1.92 03/05/2024   Labs:  Lab Results   Component Value Date    WBC 5.3 12/10/2024    NEUTROABS 3.29 12/10/2024    IGABSOL 0.02 12/10/2024    LYMPHSABS 1.41 12/10/2024    MONOSABS 0.46 12/10/2024    EOSABS 0.10 12/10/2024    BASOSABS 0.03 12/10/2024    RBC 4.12 12/10/2024    MCV 99 12/10/2024    MCHC 32.9 12/10/2024    HGB 13.4 12/10/2024    HCT 40.7 12/10/2024     12/10/2024     Lab Results   Component Value Date    RETICCTPCT 2.1 (H) 10/09/2024      Lab Results   Component Value Date    CREATININE 0.82 10/09/2024    BUN 14 10/09/2024    EGFR >90 10/09/2024     10/09/2024    K 4.1 10/09/2024     10/09/2024    CO2 25 10/09/2024      Lab Results   Component Value Date    ALT 8 10/09/2024    AST 12 10/09/2024    ALKPHOS 56 10/09/2024    BILITOT 0.6 10/09/2024      Lab Results   Component Value Date    TSH 1.92 03/05/2024     Lab Results   Component Value Date    TSH 1.92 03/05/2024     Lab Results   Component Value Date    IRON 173 (H) 12/10/2024    TIBC 264 12/10/2024    FERRITIN 177 (H) 12/10/2024      Lab Results   Component Value Date    MVFXHKYY18 364 10/09/2024      Lab Results  "  Component Value Date    FOLATE 13.3 10/09/2024     Lab Results   Component Value Date    KAYLA Negative 12/10/2024    SEDRATE 8 10/09/2024      Lab Results   Component Value Date    CRP <0.10 10/09/2024      No results found for: \"MICHAEL\"  Lab Results   Component Value Date     10/09/2024          Radiology/Studies:   US AXILLA ONLY RIGHT  4/19/2024  IMPRESSION: PROBABLY BENIGN - SHORT TERM INTERVAL FOLLOW-UP RECOMMENDED   Probable benign findings in the right axilla with continued appearance of   borderline-enlarged right axillary tail lymph nodes.  Minimally improved   since the previous study.   Continued surveillance for stability or resolution is recommended with   repeat right axillary ultrasound within 6 months.   A follow-up ultrasound in 6 months is recommended to demonstrate   stability.   ASSESSMENT/PLAN     Assessment and Plan:   #1. Leukopenia   #2. Iron deficiency   38 year old female presents for evaluation of leukopenia. Noted over the last 7 months WBC 3.6-4.1. No other abnormality on CBC.  Discussed leukopenia workup- possible causes including low nutritional values, infections or viruses, bone marrow suppression versus other etiology. Iron deficiency is noted will order IV iron.  Patient unable to tolerate oral iron.  Patient will follow with gynecology for fibroid and biopsy.  Iron deficiency improving we will reassess in 2 months and at order more iron as needed.  Most recent CBC shows a normal white count.  12/18/2024 iron stores showing repleted after IV iron.  CBC unremarkable at this time.  Patient following with her pulmonologist for recent illness.  Follow-up visit 3 months to assess leukopenia and iron deficiency.  Patient will follow with gynecology for uterine fibroid.    **Please follow with specialties as scheduled for other comorbidities and routine health screenings.**    I have reviewed the patient's medical record including provider notes, laboratory and testing results, " imaging, and procedures available within the system and outside the system pertinent to patient care.     Follow up:    RTC:  -3 months with labs  -IV iron as needed    Medications:  -B12 500 mcg daily     Imaging/Testing:  -NA    Referral:  -Gyn     Other Pertinent Appointments:  -ENT 12/24/2024  -Gynecology 1/9/2025      Patient Discussion Summary:  Discussed plan of care in detail. Patient states understanding and in agreement. Answered all questions. They will call with any additional questions and/or concerns.    Thank you for allowing me to participate in your care. It was a pleasure meeting you.    Sincerely,  Yesenia Espitia, APRN-CNP       This document may have been written by voice recognition software.  There may be some incorrect wording, spelling and/or spelling errors or punctuation errors that were not corrected prior to committing the note to the medical record. Please request clarification if there is documentation error or clarification is needed.   Time based billing: Please see documentation within this specific encounter.

## 2024-12-18 NOTE — PROGRESS NOTES
Patient Name: Christine Bustos  MRN: [id number]  Today's Date: 12/19/2024  Time Calculation  Start Time: 0830  Stop Time: 0915  Time Calculation (min): 45 min  PT Therapeutic Procedures Time Entry  Manual Therapy Time Entry: 30  Therapeutic Exercise Time Entry: 5  PT Modalities Time Entry  E-Stim (Attended) Time Entry: 10     Visit: 66/MN    Diagnosis:   1. Femoroacetabular impingement of left hip  Follow Up In Physical Therapy          PRECAUTIONS:  Left hip arthroscopy 3/26/24    SUBJECTIVE:  Patient reports that her hip is feeling better.  Less swollen and tenderness.  Is sick so she is on antibiotics and a steroid.    HEP performed? Y  Pain: 4/10 anterior left hip     OBJECTIVE:  Left hip strength: hip flexion 4-/5, abd 4-/5, ER 4-/5 (in available ROM)    TREATMENT:  Bike x 5 min  - Manual Therapy:  STM/DTM to R hip adductor, rectus, hip flexor, and lateral hip musculature  LD with ER    Thermaprobe to L HF with cold x 10 min         - Therex:  -      ASSESSMENT:  Patients symptoms are have improved significantly with use of thermaprobe. Continue to work on increasing hip flexion and ER flexibility. Focus on single leg strength and make sure she tests strong enough prior to initiating hopping.   PLAN:  Assess response to care. If symptoms have improved resume some strength and flexibility next session.

## 2024-12-19 ENCOUNTER — TREATMENT (OUTPATIENT)
Dept: PHYSICAL THERAPY | Facility: CLINIC | Age: 39
End: 2024-12-19
Payer: COMMERCIAL

## 2024-12-19 DIAGNOSIS — M25.852 FEMOROACETABULAR IMPINGEMENT OF LEFT HIP: Primary | ICD-10-CM

## 2024-12-19 PROCEDURE — 97032 APPL MODALITY 1+ESTIM EA 15: CPT | Mod: GP,CQ

## 2024-12-19 PROCEDURE — 97140 MANUAL THERAPY 1/> REGIONS: CPT | Mod: GP,CQ

## 2024-12-23 ENCOUNTER — TREATMENT (OUTPATIENT)
Dept: PHYSICAL THERAPY | Facility: CLINIC | Age: 39
End: 2024-12-23
Payer: COMMERCIAL

## 2024-12-23 DIAGNOSIS — M25.852 FEMOROACETABULAR IMPINGEMENT OF LEFT HIP: ICD-10-CM

## 2024-12-24 ENCOUNTER — APPOINTMENT (OUTPATIENT)
Dept: OTOLARYNGOLOGY | Facility: CLINIC | Age: 39
End: 2024-12-24
Payer: COMMERCIAL

## 2024-12-26 ENCOUNTER — OFFICE VISIT (OUTPATIENT)
Dept: URGENT CARE | Age: 39
End: 2024-12-26
Payer: COMMERCIAL

## 2024-12-26 VITALS
OXYGEN SATURATION: 98 % | HEART RATE: 67 BPM | DIASTOLIC BLOOD PRESSURE: 87 MMHG | TEMPERATURE: 97.5 F | WEIGHT: 120 LBS | BODY MASS INDEX: 18.64 KG/M2 | RESPIRATION RATE: 16 BRPM | SYSTOLIC BLOOD PRESSURE: 139 MMHG

## 2024-12-26 DIAGNOSIS — N76.0 ACUTE VAGINITIS: ICD-10-CM

## 2024-12-26 DIAGNOSIS — B37.31 CANDIDAL VAGINITIS: Primary | ICD-10-CM

## 2024-12-26 PROCEDURE — 87491 CHLMYD TRACH DNA AMP PROBE: CPT

## 2024-12-26 PROCEDURE — 87205 SMEAR GRAM STAIN: CPT

## 2024-12-26 PROCEDURE — 99213 OFFICE O/P EST LOW 20 MIN: CPT

## 2024-12-26 PROCEDURE — 1036F TOBACCO NON-USER: CPT

## 2024-12-26 PROCEDURE — 87661 TRICHOMONAS VAGINALIS AMPLIF: CPT

## 2024-12-26 PROCEDURE — 87591 N.GONORRHOEAE DNA AMP PROB: CPT

## 2024-12-26 RX ORDER — FLUCONAZOLE 150 MG/1
150 TABLET ORAL ONCE
Qty: 1 TABLET | Refills: 0 | Status: SHIPPED | OUTPATIENT
Start: 2024-12-26 | End: 2024-12-26

## 2024-12-26 ASSESSMENT — ENCOUNTER SYMPTOMS
CARDIOVASCULAR NEGATIVE: 1
EYES NEGATIVE: 1
CONSTITUTIONAL NEGATIVE: 1
FREQUENCY: 0
NEUROLOGICAL NEGATIVE: 1
PSYCHIATRIC NEGATIVE: 1
RESPIRATORY NEGATIVE: 1
FLANK PAIN: 0
GASTROINTESTINAL NEGATIVE: 1
MUSCULOSKELETAL NEGATIVE: 1
DYSURIA: 0

## 2024-12-26 NOTE — PROGRESS NOTES
Subjective   Patient ID: Christine Bustos is a 39 y.o. female. They present today with a chief complaint of Vaginitis/Bacterial Vaginosis.    History of Present Illness  C/o vaginal itching, cottage cheese discharge x2 days. Confirms sexually active w/1 known male over the past 9 months. She did try OTC monistat, 1 dose last night. Allergy to Flagyl but states she can take oral flagyl. States she had an IV infiltrate with metronidazole with burning and erythema and it was added to her allergy list. Chart confirms this. Patient denies any CP, SOB, HA, fever, abdominal pain, and nausea/vomiting otherwise.      History provided by:  Patient      Past Medical History  Allergies as of 12/26/2024 - Reviewed 12/26/2024   Allergen Reaction Noted    Ciprofloxacin Other 03/11/2024    Flagyl [metronidazole] Other 03/11/2024    Motrin [ibuprofen] Other 01/15/2024       (Not in a hospital admission)       Past Medical History:   Diagnosis Date    Acute renal failure (CMS-HCC)     as a child    Asthma     exercise or infection induced    Delayed emergence from general anesthesia     The last time I was put under anesthesia was in 2017 or 2018 and they always tell me to not go back to sleep and constantly come over to me because I try going back to sleep    Endometriosis     Fissure, anal 2006    PFO (patent foramen ovale) ([facility])     last checked 2023 checked- ok       Past Surgical History:   Procedure Laterality Date    ADENOIDECTOMY      OTHER SURGICAL HISTORY  01/20/2016    wisdom teeth    OTHER SURGICAL HISTORY  01/20/2016    Laparoscopic Excision Of Ectopic Preg & bilateral tubes    OTHER SURGICAL HISTORY  11/03/2017    Laparos Large Intest Laser Vaporization Endometriotic Tissue    OTHER SURGICAL HISTORY      excision  of endometrial tissue    OTHER SURGICAL HISTORY      right axilla lymph node biopsy - neg    TONSILLECTOMY      TUBAL LIGATION          reports that she has never smoked. She has never used smokeless  tobacco. She reports that she does not drink alcohol and does not use drugs.    Review of Systems  Review of Systems   Constitutional: Negative.    HENT: Negative.     Eyes: Negative.    Respiratory: Negative.     Cardiovascular: Negative.    Gastrointestinal: Negative.    Genitourinary:  Positive for vaginal discharge. Negative for dysuria, flank pain, frequency, vaginal bleeding and vaginal pain.   Musculoskeletal: Negative.    Skin: Negative.    Neurological: Negative.    Psychiatric/Behavioral: Negative.                                    Objective    Vitals:    12/26/24 1717   BP: 139/87   Pulse: 67   Resp: 16   Temp: 36.4 °C (97.5 °F)   SpO2: 98%   Weight: 54.4 kg (120 lb)     No LMP recorded.    Physical Exam  Constitutional:       General: She is not in acute distress.     Appearance: Normal appearance. She is not ill-appearing.   HENT:      Head: Normocephalic and atraumatic.      Right Ear: Tympanic membrane and ear canal normal.      Left Ear: Tympanic membrane and ear canal normal.      Nose: Nose normal.      Mouth/Throat:      Mouth: Mucous membranes are moist.      Pharynx: Oropharynx is clear.   Eyes:      Extraocular Movements: Extraocular movements intact.      Pupils: Pupils are equal, round, and reactive to light.   Cardiovascular:      Rate and Rhythm: Normal rate and regular rhythm.      Pulses: Normal pulses.      Heart sounds: Normal heart sounds.   Pulmonary:      Effort: Pulmonary effort is normal.      Breath sounds: Normal breath sounds.   Abdominal:      General: Abdomen is flat. Bowel sounds are normal.      Palpations: Abdomen is soft. There is no mass.      Tenderness: There is no abdominal tenderness. There is no guarding or rebound.   Skin:     General: Skin is warm and dry.   Neurological:      Mental Status: She is alert and oriented to person, place, and time.   Psychiatric:         Mood and Affect: Mood normal.         Behavior: Behavior normal.         Procedures    Point of  Care Test & Imaging Results from this visit  No results found for this visit on 12/26/24.   No results found.    Diagnostic study results (if any) were reviewed by ROSE Buckner.    Assessment/Plan   Allergies, medications, history, and pertinent labs/EKGs/Imaging reviewed by ROSE Buckner.     Medical Decision Making  Self swab send out testing done. Will send diflucan to treat suspected yeast infection. Treatment alteration if needed based on final testing results. Patient verbalized understanding and agreed with the plan of care.    At time of discharge, patient was clinically well-appearing and appropriate for outpatient management. The patient/parent/guardian was educated regarding diagnosis, supportive care, OTC and Rx medications. The patient/parent/guardian was given the opportunity to ask questions prior to discharge. They verbalized understanding of discussion of treatment plan, expected course of illness and/or injury, indications on when to return to , when to seek further evaluation in ED/call 911, and the need to follow up with PCP and/or specialist as referred. Patient/parent/guardian was provided with work/school documentation if requested. Patient stable upon discharge.      Orders and Diagnoses  Diagnoses and all orders for this visit:  Candidal vaginitis  -     fluconazole (Diflucan) 150 mg tablet; Take 1 tablet (150 mg) by mouth 1 time for 1 dose.  Acute vaginitis  -     C. trachomatis / N. gonorrhoeae, Amplified; Future  -     Trichomonas vaginalis, Amplified  -     Vaginitis Gram Stain For Bacterial Vaginosis + Yeast      Medical Admin Record      Patient disposition: Home    Electronically signed by ROSE Buckner  9:03 PM

## 2024-12-27 LAB
BACTERIAL VAGINOSIS VAG-IMP: NORMAL
C TRACH RRNA SPEC QL NAA+PROBE: NEGATIVE
CLUE CELLS VAG LPF-#/AREA: NORMAL /[LPF]
N GONORRHOEA DNA SPEC QL PROBE+SIG AMP: NEGATIVE
NUGENT SCORE: 4
T VAGINALIS RRNA SPEC QL NAA+PROBE: NEGATIVE
YEAST VAG WET PREP-#/AREA: NORMAL

## 2024-12-27 RX ORDER — METRONIDAZOLE 500 MG/1
500 TABLET ORAL 2 TIMES DAILY
Qty: 14 TABLET | Refills: 0 | Status: SHIPPED | OUTPATIENT
Start: 2024-12-27 | End: 2025-01-03

## 2024-12-28 ENCOUNTER — TELEPHONE (OUTPATIENT)
Dept: URGENT CARE | Age: 39
End: 2024-12-28

## 2024-12-30 NOTE — PROGRESS NOTES
Patient Name: Christine Bustos  MRN: [id number]  Today's Date: 12/31/2024  Time Calculation  Start Time: 0915  Stop Time: 1000  Time Calculation (min): 45 min  PT Therapeutic Procedures Time Entry  Manual Therapy Time Entry: 30  Therapeutic Exercise Time Entry: 12        Visit: 67/MN    Diagnosis:   1. Femoroacetabular impingement of left hip  Follow Up In Physical Therapy      2. Left hip pain            PRECAUTIONS:  Left hip arthroscopy 3/26/24    SUBJECTIVE:      HEP performed? Y  Pain: 4/10 anterior left hip     OBJECTIVE:  Left hip strength: hip flexion 4-/5, abd 4-/5, ER 4-/5 (in available ROM)    TREATMENT:  Bike x 5 min  Donkey kicks 3 x 10 B   Hip flexion iso with ball   - Manual Therapy:  STM/DTM to R hip adductor, rectus, hip flexor, and lateral hip musculature  LD with ER                  ASSESSMENT:  Patients symptoms are have improved and is ready to work into strengthening again.  Continue to work on increasing hip flexion and ER flexibility. Focus on single leg strength and make sure she tests strong enough prior to initiating hopping.   PLAN:  Assess response to above strengthening and progress as able.   15-Nov-2019 23:54

## 2024-12-31 ENCOUNTER — APPOINTMENT (OUTPATIENT)
Dept: OBSTETRICS AND GYNECOLOGY | Facility: CLINIC | Age: 39
End: 2024-12-31
Payer: COMMERCIAL

## 2024-12-31 ENCOUNTER — TREATMENT (OUTPATIENT)
Dept: PHYSICAL THERAPY | Facility: CLINIC | Age: 39
End: 2024-12-31
Payer: COMMERCIAL

## 2024-12-31 DIAGNOSIS — M25.852 FEMOROACETABULAR IMPINGEMENT OF LEFT HIP: Primary | ICD-10-CM

## 2024-12-31 DIAGNOSIS — M25.552 LEFT HIP PAIN: ICD-10-CM

## 2024-12-31 PROCEDURE — 97110 THERAPEUTIC EXERCISES: CPT | Mod: GP,CQ

## 2024-12-31 PROCEDURE — 97140 MANUAL THERAPY 1/> REGIONS: CPT | Mod: GP,CQ

## 2025-01-02 ENCOUNTER — APPOINTMENT (OUTPATIENT)
Dept: PHYSICAL THERAPY | Facility: CLINIC | Age: 40
End: 2025-01-02
Payer: COMMERCIAL

## 2025-01-06 ENCOUNTER — TREATMENT (OUTPATIENT)
Dept: PHYSICAL THERAPY | Facility: CLINIC | Age: 40
End: 2025-01-06
Payer: COMMERCIAL

## 2025-01-06 DIAGNOSIS — S73.192D TEAR OF LEFT ACETABULAR LABRUM, SUBSEQUENT ENCOUNTER: Primary | ICD-10-CM

## 2025-01-06 DIAGNOSIS — M25.852 FEMOROACETABULAR IMPINGEMENT OF LEFT HIP: ICD-10-CM

## 2025-01-06 PROCEDURE — 97110 THERAPEUTIC EXERCISES: CPT | Mod: GP

## 2025-01-06 PROCEDURE — 97140 MANUAL THERAPY 1/> REGIONS: CPT | Mod: GP

## 2025-01-06 NOTE — PROGRESS NOTES
Patient Name: Christine Bustos  MRN: [id number]  Today's Date: 1/6/2025  Time Calculation  Start Time: 1445  Stop Time: 1530  Time Calculation (min): 45 min  PT Therapeutic Procedures Time Entry  Manual Therapy Time Entry: 25  Therapeutic Exercise Time Entry: 15        Visit: 1/MN (67 used in 2024)    Diagnosis:   1. Tear of left acetabular labrum, subsequent encounter        2. Femoroacetabular impingement of left hip              PRECAUTIONS:  Left hip arthroscopy 3/26/24    SUBJECTIVE:  Pt continues to feel better from her flare up. She has been slowly introducing more exercise into her routine but has not been able to get back to a full gym routine.     HEP performed? Y  Pain: 4/10 anterior left hip     OBJECTIVE:  Left hip strength: hip flexion 4-/5, abd 4-/5, ER 4-/5 (in available ROM)    TREATMENT:  Bike x 5 min  Adduction bridge 2 x 10   Plank on knees 3 x 30 sec   - Manual Therapy:  STM/DTM to R hip adductor, rectus, hip flexor, and lateral hip musculature  LD with ER      ASSESSMENT:  Patient tolerated session well and continued to have less tenderness surrounding her hip. Pt was able to continue to introduce more exercise into her HEP without any increase in pain. Pt continues to progress towards goals established in the POC and should continue with skilled therapy.       PLAN:  Assess response to above strengthening and progress as able.

## 2025-01-08 ENCOUNTER — TREATMENT (OUTPATIENT)
Dept: PHYSICAL THERAPY | Facility: CLINIC | Age: 40
End: 2025-01-08
Payer: COMMERCIAL

## 2025-01-08 DIAGNOSIS — M25.852 FEMOROACETABULAR IMPINGEMENT OF LEFT HIP: ICD-10-CM

## 2025-01-08 DIAGNOSIS — S73.192D TEAR OF LEFT ACETABULAR LABRUM, SUBSEQUENT ENCOUNTER: Primary | ICD-10-CM

## 2025-01-08 PROCEDURE — 97140 MANUAL THERAPY 1/> REGIONS: CPT | Mod: GP

## 2025-01-08 PROCEDURE — 97110 THERAPEUTIC EXERCISES: CPT | Mod: GP

## 2025-01-08 NOTE — PROGRESS NOTES
"    Patient Name: Christine Bustos  MRN: [id number]  Today's Date: 1/8/2025  Time Calculation  Start Time: 1515  Stop Time: 1600  Time Calculation (min): 45 min  PT Therapeutic Procedures Time Entry  Manual Therapy Time Entry: 23  Therapeutic Exercise Time Entry: 17        Visit: 2/MN (67 used in 2024)    Diagnosis:   1. Tear of left acetabular labrum, subsequent encounter        2. Femoroacetabular impingement of left hip                PRECAUTIONS:  Left hip arthroscopy 3/26/24    SUBJECTIVE:  Pt has continued to have no increased anterior hip pain and has been keeping up with her HEP.     HEP performed? Y  Pain: 4/10 anterior left hip     OBJECTIVE:  Left hip strength: hip flexion 4-/5, abd 4-/5, ER 4-/5 (in available ROM)    TREATMENT:  Bike x 5 min  BL shuttle press (25,37#) 2 x 15   SL shuttle press (25#) 2 x 10   4\" heel tap 3 x 10  - Manual Therapy:  STM/DTM to R hip adductor, rectus, hip flexor, and lateral hip musculature  LD with ER      ASSESSMENT:  Patient tolerated session well. Pt was able to get back to light LE strengthening without any increase in anterior hip pain. Pt also had no increased tenderness to the touch when STM was performed to anterior hip. Pt continues to progress towards goals established in the POC and should continue with skilled therapy.         PLAN:  Assess response to above strengthening and progress as able.  "

## 2025-01-09 ENCOUNTER — APPOINTMENT (OUTPATIENT)
Dept: OBSTETRICS AND GYNECOLOGY | Facility: CLINIC | Age: 40
End: 2025-01-09
Payer: COMMERCIAL

## 2025-01-09 VITALS
SYSTOLIC BLOOD PRESSURE: 120 MMHG | DIASTOLIC BLOOD PRESSURE: 62 MMHG | HEIGHT: 67 IN | BODY MASS INDEX: 19.93 KG/M2 | WEIGHT: 127 LBS

## 2025-01-09 DIAGNOSIS — D25.9 UTERINE LEIOMYOMA, UNSPECIFIED LOCATION: Primary | ICD-10-CM

## 2025-01-09 DIAGNOSIS — N89.8 VAGINAL DISCHARGE: ICD-10-CM

## 2025-01-09 DIAGNOSIS — N85.8 UTERINE MASS: ICD-10-CM

## 2025-01-09 PROCEDURE — 1036F TOBACCO NON-USER: CPT | Performed by: OBSTETRICS & GYNECOLOGY

## 2025-01-09 PROCEDURE — 3008F BODY MASS INDEX DOCD: CPT | Performed by: OBSTETRICS & GYNECOLOGY

## 2025-01-09 PROCEDURE — 99204 OFFICE O/P NEW MOD 45 MIN: CPT | Performed by: OBSTETRICS & GYNECOLOGY

## 2025-01-09 PROCEDURE — 87205 SMEAR GRAM STAIN: CPT

## 2025-01-09 ASSESSMENT — ENCOUNTER SYMPTOMS
PSYCHIATRIC NEGATIVE: 1
NEUROLOGICAL NEGATIVE: 1
MUSCULOSKELETAL NEGATIVE: 1
CONSTITUTIONAL NEGATIVE: 1
GASTROINTESTINAL NEGATIVE: 1
CARDIOVASCULAR NEGATIVE: 1

## 2025-01-09 NOTE — PROGRESS NOTES
"Subjective   Patient ID: Christine Bustos is a 39 y.o. female who presents for New Patient Visit.  HPI    Patient presents for a second opinion for fibroids. She reports periods are regular every 28 days, with fluctuating length from 2-7 days. She reports excessively heavy flow the first 2 days of her period. She states her periods have been like this since laparoscopy done for endometriosis in 2016 and 2017. She has had both tubes removed due to ectopic pregnancy and sterilization.     Patient was seen by hematology for follicular hyperplasia, diagnosed on biopsy of an axillary lump. She was seen by hematology and was noted to be anemic. The hematologist ordered CT scan of the abdomen, pelvis, and chest due to the enlarged lymph node in the axilla and was incidentally found to have a new soft tissue density arising from the uterus with necrosis. This was thought to be a necrotic fibroid. She has been seen by another gynecologist and states she was confused with her management options prompting her to seek a second opinion.     Review of Systems   Constitutional: Negative.    Cardiovascular: Negative.    Gastrointestinal: Negative.    Genitourinary: Negative.    Musculoskeletal: Negative.    Neurological: Negative.    Psychiatric/Behavioral: Negative.         Objective   Visit Vitals  /62   Ht 1.702 m (5' 7\")   Wt 57.6 kg (127 lb)   LMP 12/25/2024 (Exact Date)   BMI 19.89 kg/m²   OB Status Having periods   Smoking Status Never   BSA 1.65 m²       Physical Exam  Constitutional:       Appearance: Normal appearance.   Pulmonary:      Effort: Pulmonary effort is normal.   Genitourinary:     Comments: Vulva normal in appearance without discoloration, rashes, lesions. Vagina is negative for blood, discharge, lesions. Uterus is small, mobile, non tender. There is no cervical motion tenderness, adnexal masses/tenderness.  Neurological:      Mental Status: She is alert.   Psychiatric:         Mood and Affect: Mood " normal.         Behavior: Behavior normal.         Assessment/Plan   Problem List Items Addressed This Visit    None  Visit Diagnoses         Codes    Uterine leiomyoma, unspecified location    -  Primary D25.9    Relevant Orders    US PELVIS TRANSABDOMINAL WITH TRANSVAGINAL    Uterine mass     N85.8    Relevant Orders    US PELVIS TRANSABDOMINAL WITH TRANSVAGINAL    Vaginal discharge     N89.8    Relevant Orders    Vaginitis Gram Stain For Bacterial Vaginosis + Yeast          Discussed findings on exam/imaging and clinical course  On review of chart 4 cm soft mass seen on CT is consistent with the 4 cm fibroid seen on [facility] US a year ago  Discussed fibroids are generally benign with low risk of malignancy. Discussed signs of a fibroid not being benign would be fast interval growth, so the lack of growth over the last year makes malignancy less likely  Advised fibroids can be problematic if they grow large enough to cause mass effect, if degeneration causes pain, or if location in myometrium results in AUB.   Discussed options for management of symptomatic fibroids including hormonal suppression, myomectomy, UAE, and hysterectomy.  Pelvic US ordered and patient instructed to have done at [facility] to allow appropriate comparison with previous US  If US imaging does not clarify clinical picture, may consider pelvic MRI  All questions answered  Will call with imaging results.         Nathalie Sorto DO 01/09/25 2:43 PM

## 2025-01-10 LAB
CLUE CELLS VAG LPF-#/AREA: NORMAL /[LPF]
NUGENT SCORE: 2
YEAST VAG WET PREP-#/AREA: NORMAL

## 2025-01-13 NOTE — PROGRESS NOTES
"    Patient Name: Christine Bustos  MRN: [id number]  Today's Date: 1/14/2025  Time Calculation  Start Time: 1215  Stop Time: 1300  Time Calculation (min): 45 min  PT Therapeutic Procedures Time Entry  Manual Therapy Time Entry: 15  Therapeutic Exercise Time Entry: 30        Visit: 3/MN (67 used in 2024)    Diagnosis:   1. Left hip pain        2. Femoroacetabular impingement of left hip                PRECAUTIONS:  Left hip arthroscopy 3/26/24    SUBJECTIVE:  Patient reports that she occasionally has an achiness in thigh but everything else if better.    HEP performed? Y  Pain: 4/10 anterior left hip     OBJECTIVE:  Left hip strength: hip flexion 4-/5, abd 4-/5, ER 4-/5 (in available ROM)    TREATMENT:  Bike x 5 min  BL shuttle press (37#) 2 x 15   SL shuttle press (25#) 2 x 10   Prone quad stretch 3 x 60\"   1/2 kneeling HF stretch 3 x 30\"     - Manual Therapy:  STM/DTM to R hip adductor, rectus, hip flexor, and lateral hip musculature  LD with ER      ASSESSMENT:  Patient tolerated session well. Pt was able to get back to light quad and HF stretches without increased pain in anterior hip.  Pt continues to progress towards goals established in the POC and should continue with skilled therapy.         PLAN:  Assess response to above strengthening and progress as able.  "

## 2025-01-14 ENCOUNTER — TREATMENT (OUTPATIENT)
Dept: PHYSICAL THERAPY | Facility: CLINIC | Age: 40
End: 2025-01-14
Payer: COMMERCIAL

## 2025-01-14 DIAGNOSIS — M25.552 LEFT HIP PAIN: Primary | ICD-10-CM

## 2025-01-14 DIAGNOSIS — M25.852 FEMOROACETABULAR IMPINGEMENT OF LEFT HIP: ICD-10-CM

## 2025-01-14 PROCEDURE — 97140 MANUAL THERAPY 1/> REGIONS: CPT | Mod: GP,CQ

## 2025-01-14 PROCEDURE — 97110 THERAPEUTIC EXERCISES: CPT | Mod: GP,CQ

## 2025-01-15 NOTE — PROGRESS NOTES
Patient Name: Christine Bustos  MRN: [id number]  Today's Date: 1/16/2025  Time Calculation  Start Time: 1045  Stop Time: 1132  Time Calculation (min): 47 min  PT Therapeutic Procedures Time Entry  Manual Therapy Time Entry: 15  Therapeutic Exercise Time Entry: 30        Visit: 4/MN (67 used in 2024)    Diagnosis:   1. Femoroacetabular impingement of left hip                PRECAUTIONS:  Left hip arthroscopy 3/26/24    SUBJECTIVE:  Patient reports that she is feeling pretty good. Tolerating resuming strengthening and stretching without a problem.    HEP performed? Y  Pain: 4/10 anterior left hip     OBJECTIVE:  Left hip strength: hip flexion 4-/5, abd 4-/5, ER 4-/5 (in available ROM)    TREATMENT:  Bike x 5 min  Leg press DL 40#  3 x 10   Mini squats 2 x 10       - Manual Therapy:  STM/DTM to R hip adductor, rectus, hip flexor, and lateral hip musculature  LD with ER      ASSESSMENT:  Patient tolerated session well.Able to tolerate leg press and addition of mini squats back into her strengthening program.      Pt continues to progress towards goals established in the POC and should continue with skilled therapy.         PLAN:  increase Leg Press wt if tolerated well.  Assess response to above strengthening and progress as able.

## 2025-01-16 ENCOUNTER — TREATMENT (OUTPATIENT)
Dept: PHYSICAL THERAPY | Facility: CLINIC | Age: 40
End: 2025-01-16
Payer: COMMERCIAL

## 2025-01-16 DIAGNOSIS — M25.852 FEMOROACETABULAR IMPINGEMENT OF LEFT HIP: Primary | ICD-10-CM

## 2025-01-16 PROCEDURE — 97110 THERAPEUTIC EXERCISES: CPT | Mod: GP,CQ

## 2025-01-16 PROCEDURE — 97140 MANUAL THERAPY 1/> REGIONS: CPT | Mod: GP,CQ

## 2025-01-21 ENCOUNTER — TREATMENT (OUTPATIENT)
Dept: PHYSICAL THERAPY | Facility: CLINIC | Age: 40
End: 2025-01-21
Payer: COMMERCIAL

## 2025-01-21 DIAGNOSIS — S73.192D TEAR OF LEFT ACETABULAR LABRUM, SUBSEQUENT ENCOUNTER: Primary | ICD-10-CM

## 2025-01-21 PROCEDURE — 97110 THERAPEUTIC EXERCISES: CPT | Mod: GP

## 2025-01-21 PROCEDURE — 97140 MANUAL THERAPY 1/> REGIONS: CPT | Mod: GP

## 2025-01-21 NOTE — PROGRESS NOTES
Patient Name: Christine Bustos  MRN: [id number]  Today's Date: 1/21/2025  Time Calculation  Start Time: 1430  Stop Time: 1515  Time Calculation (min): 45 min  PT Therapeutic Procedures Time Entry  Manual Therapy Time Entry: 25  Therapeutic Exercise Time Entry: 15        Visit: Lnidy/MN (67 used in 2024)    Diagnosis:   1. Tear of left acetabular labrum, subsequent encounter                  PRECAUTIONS:  Left hip arthroscopy 3/26/24    SUBJECTIVE:  Patient has continued to keep up with her HEP. She had some opposite hip pain but this has gotten better.     HEP performed? Y  Pain: 4/10 anterior left hip     OBJECTIVE:  Left hip strength: hip flexion 4-/5, abd 4-/5, ER 4-/5 (in available ROM)    TREATMENT:  Bike x 5 min  Reverse slider lunge 3 x 10   Standing banded hip flexion 3 x 10  Hip hinge to target 2 x 6       - Manual Therapy:  STM/DTM to R hip adductor, rectus, hip flexor, and lateral hip musculature  LD with ER      ASSESSMENT:  Patient tolerated session well. Pt was able to continue to progress SL strengthening without any increase in hip pain. Pt continues to make good strides since having a recent set back. Pt continues to progress towards goals established in the POC and should continue with skilled therapy.         PLAN:  increase Leg Press wt if tolerated well.  Assess response to above strengthening and progress as able.

## 2025-01-23 ENCOUNTER — TREATMENT (OUTPATIENT)
Dept: PHYSICAL THERAPY | Facility: CLINIC | Age: 40
End: 2025-01-23
Payer: COMMERCIAL

## 2025-01-23 DIAGNOSIS — S73.192D TEAR OF LEFT ACETABULAR LABRUM, SUBSEQUENT ENCOUNTER: Primary | ICD-10-CM

## 2025-01-23 PROCEDURE — 97140 MANUAL THERAPY 1/> REGIONS: CPT | Mod: GP

## 2025-01-23 PROCEDURE — 97110 THERAPEUTIC EXERCISES: CPT | Mod: GP

## 2025-01-23 NOTE — PROGRESS NOTES
"    Patient Name: Christine Bustos  MRN: [id number]  Today's Date: 1/23/2025  Time Calculation  Start Time: 1130  Stop Time: 1215  Time Calculation (min): 45 min  PT Therapeutic Procedures Time Entry  Manual Therapy Time Entry: 23  Therapeutic Exercise Time Entry: 17        Visit: 6/MN (67 used in 2024)    Diagnosis:   1. Tear of left acetabular labrum, subsequent encounter                    PRECAUTIONS:  Left hip arthroscopy 3/26/24    SUBJECTIVE:  Patient continues to have no pain in hip and continues to keep up with her HEP.     HEP performed? Y  Pain: 4/10 anterior left hip     OBJECTIVE:  Left hip strength: hip flexion 4-/5, abd 4-/5, ER 4-/5 (in available ROM)    TREATMENT:  Bike x 5 min  SL leg press (40#) 3 x 10   Band assisted jumps 2 x 10   6\" heel tap 3 x 10        - Manual Therapy:  STM/DTM to R hip adductor, rectus, hip flexor, and lateral hip musculature  LD with ER      ASSESSMENT:  Patient tolerated session well. Pt was able to continue to work on SL strengthening but needed assistance for her last 3 reps in the second and third sets. Pt needed banded assistance to begin to work on mini jumps. Pt continues to progress towards goals established in the POC and should continue with skilled therapy.           PLAN:  increase Leg Press wt if tolerated well.  Assess response to above strengthening and progress as able.  "

## 2025-01-24 ENCOUNTER — APPOINTMENT (OUTPATIENT)
Dept: ORTHOPEDIC SURGERY | Facility: HOSPITAL | Age: 40
End: 2025-01-24
Payer: COMMERCIAL

## 2025-01-27 NOTE — PROGRESS NOTES
"    Patient Name: Christine Bustos  MRN: [id number]  Today's Date: 1/28/2025  Time Calculation  Start Time: 1130  Stop Time: 1215  Time Calculation (min): 45 min  PT Therapeutic Procedures Time Entry  Manual Therapy Time Entry: 15  Therapeutic Exercise Time Entry: 30        Visit: Bakari/MN (67 used in 2024)    Diagnosis:   1. Femoroacetabular impingement of left hip                    PRECAUTIONS:  Left hip arthroscopy 3/26/24    SUBJECTIVE:  Patient continues to have no pain in hip and continues to keep up with her HEP. Iv been working on jumping.    HEP performed? Y  Pain: 0/10 anterior left hip     OBJECTIVE:  Left hip strength: hip flexion 4-/5, abd 4-/5, ER 4-/5 (in available ROM)    TREATMENT:  Bike x 5 min  Shuttle jumps 25#  3 x 10   SL leg press (40#) 3 x 10   Band assisted jumps 2 x 10   6\" heel tap 3 x 10  (no UE assist)       - Manual Therapy:  STM/DTM to R hip adductor, rectus, hip flexor, and lateral hip musculature  LD with ER      ASSESSMENT:  Patient tolerated session well. Pt was able to continue to work on SL strengthening she was able to jump better but continues to lean to R .  She was able to finish 3 sets on leg press without assistance but was very challenged the last set.  Pt continues to progress towards goals established in the POC and should continue with skilled therapy.           PLAN:  increase Leg Press wt if tolerated well.  Assess response to above strengthening and progress as able.  "

## 2025-01-28 ENCOUNTER — TREATMENT (OUTPATIENT)
Dept: PHYSICAL THERAPY | Facility: CLINIC | Age: 40
End: 2025-01-28
Payer: COMMERCIAL

## 2025-01-28 DIAGNOSIS — M25.852 FEMOROACETABULAR IMPINGEMENT OF LEFT HIP: Primary | ICD-10-CM

## 2025-01-28 PROCEDURE — 97110 THERAPEUTIC EXERCISES: CPT | Mod: GP,CQ

## 2025-01-28 PROCEDURE — 97140 MANUAL THERAPY 1/> REGIONS: CPT | Mod: GP,CQ

## 2025-01-30 ENCOUNTER — TREATMENT (OUTPATIENT)
Dept: PHYSICAL THERAPY | Facility: CLINIC | Age: 40
End: 2025-01-30
Payer: COMMERCIAL

## 2025-01-30 DIAGNOSIS — S73.192D TEAR OF LEFT ACETABULAR LABRUM, SUBSEQUENT ENCOUNTER: Primary | ICD-10-CM

## 2025-01-30 PROCEDURE — 97110 THERAPEUTIC EXERCISES: CPT | Mod: GP

## 2025-01-30 PROCEDURE — 97140 MANUAL THERAPY 1/> REGIONS: CPT | Mod: GP

## 2025-01-30 NOTE — PROGRESS NOTES
Patient Name: Christine Bustos  MRN: [id number]  Today's Date: 1/30/2025  Time Calculation  Start Time: 1230  Stop Time: 1315  Time Calculation (min): 45 min  PT Therapeutic Procedures Time Entry  Manual Therapy Time Entry: 10  Therapeutic Exercise Time Entry: 30        Visit: Bc/MN (67 used in 2024)    Diagnosis:   1. Tear of left acetabular labrum, subsequent encounter                      PRECAUTIONS:  Left hip arthroscopy 3/26/24    SUBJECTIVE:  Patient has been having some soreness from her HEP but no pain.     HEP performed? Y  Pain: 0/10 anterior left hip     OBJECTIVE:  Left hip strength: hip flexion 4-/5, abd 4-/5, ER 4-/5 (in available ROM)    TREATMENT:  Bike x 5 min  Banded side steps 3 x 30'   Skater steps 2 x 10   Mini skater hops 2 x 10   Forward SL land with external cue 2 x 10     - Manual Therapy:  STM/DTM to R hip adductor, rectus, hip flexor, and lateral hip musculature  LD with ER      ASSESSMENT:  Patient tolerated session well. Pt was able to continue to progress plyometrics and maintained good form. Pt is getting more clearance with her jumping. Pt continues to progress towards goals established in the POC and should continue with skilled therapy.             PLAN:  increase Leg Press wt if tolerated well.  Assess response to above strengthening and progress as able.

## 2025-02-02 NOTE — PROGRESS NOTES
"    Patient Name: Christine Bustos  MRN: [id number]  Today's Date: 2/3/2025  Time Calculation  Start Time: 0400  Stop Time: 0445  Time Calculation (min): 45 min  PT Therapeutic Procedures Time Entry  Manual Therapy Time Entry: 15  Therapeutic Exercise Time Entry: 30        Visit: 9/MN (67 used in 2024)    Diagnosis:   1. Femoroacetabular impingement of left hip  Follow Up In Physical Therapy                    PRECAUTIONS:  Left hip arthroscopy 3/26/24    SUBJECTIVE:  Patient has been feeling really good.  No soreness.    HEP performed? Y  Pain: 0/10 anterior left hip     OBJECTIVE:  Left hip strength: hip flexion 4-/5, abd 4-/5, ER 4-/5 (in available ROM)    TREATMENT:  Bike x 5 min  Banded side steps 3 x 45\"   Squats with GRN band and 10# KB   Mini skater hops 2 x 10   Diagonal hopping 2 x 10   Jump squats  2 x 10       - Manual Therapy:  STM/DTM to R hip adductor, rectus, hip flexor, and lateral hip musculature  LD with ER      ASSESSMENT:  Patient tolerated session well. Pt was able to continue to progress plyometrics and maintained good form. Patient becoming more confident with her hops.  Pt continues to progress towards goals established in the POC and should continue with skilled therapy.             PLAN:  increase Leg Press wt if tolerated well.  Assess response to above strengthening and progress as able.  "

## 2025-02-03 ENCOUNTER — TREATMENT (OUTPATIENT)
Dept: PHYSICAL THERAPY | Facility: CLINIC | Age: 40
End: 2025-02-03
Payer: COMMERCIAL

## 2025-02-03 DIAGNOSIS — M25.852 FEMOROACETABULAR IMPINGEMENT OF LEFT HIP: Primary | ICD-10-CM

## 2025-02-03 PROCEDURE — 97140 MANUAL THERAPY 1/> REGIONS: CPT | Mod: GP,CQ

## 2025-02-03 PROCEDURE — 97110 THERAPEUTIC EXERCISES: CPT | Mod: GP,CQ

## 2025-02-05 ENCOUNTER — TREATMENT (OUTPATIENT)
Dept: PHYSICAL THERAPY | Facility: CLINIC | Age: 40
End: 2025-02-05
Payer: COMMERCIAL

## 2025-02-05 DIAGNOSIS — M25.852 FEMOROACETABULAR IMPINGEMENT OF LEFT HIP: ICD-10-CM

## 2025-02-05 PROCEDURE — 97110 THERAPEUTIC EXERCISES: CPT | Mod: GP

## 2025-02-05 PROCEDURE — 97140 MANUAL THERAPY 1/> REGIONS: CPT | Mod: GP

## 2025-02-05 NOTE — PROGRESS NOTES
"    Patient Name: Christine Bustos  MRN: [id number]  Today's Date: 2/5/2025  Time Calculation  Start Time: 1515  Stop Time: 1600  Time Calculation (min): 45 min  PT Therapeutic Procedures Time Entry  Manual Therapy Time Entry: 23  Therapeutic Exercise Time Entry: 17        Visit: 10/MN (67 used in 2024)    Diagnosis:   1. Femoroacetabular impingement of left hip  Follow Up In Physical Therapy            PRECAUTIONS:  Left hip arthroscopy 3/26/24    SUBJECTIVE:  Patient had to sit in a long meeting and had an increase in hip achiness.     HEP performed? Y  Pain: 0/10 anterior left hip     OBJECTIVE:  Left hip strength: hip flexion 4-/5, abd 4-/5, ER 4-/5 (in available ROM)    TREATMENT:  Bike x 5 min  Banded side steps 3 x 45\"   Agility ladder ( one foot, two feet, lateral two feet)   4\" box lands 2 x 6     - Manual Therapy:  STM/DTM to R hip adductor, rectus, hip flexor, and lateral hip musculature  LD with ER      ASSESSMENT:  Patient tolerated session well. Pt was able to begin using agility ladder to get more on the balls of her feet when putting impact through her hip. She did very well with this and had no increase in hip pain. Pt continues to progress towards goals established in the POC and should continue with skilled therapy.               PLAN:  increase Leg Press wt if tolerated well.  Assess response to above strengthening and progress as able.  "

## 2025-02-10 ENCOUNTER — TREATMENT (OUTPATIENT)
Dept: PHYSICAL THERAPY | Facility: CLINIC | Age: 40
End: 2025-02-10
Payer: COMMERCIAL

## 2025-02-10 DIAGNOSIS — M25.852 FEMOROACETABULAR IMPINGEMENT OF LEFT HIP: ICD-10-CM

## 2025-02-10 PROCEDURE — 97140 MANUAL THERAPY 1/> REGIONS: CPT | Mod: GP

## 2025-02-10 PROCEDURE — 97110 THERAPEUTIC EXERCISES: CPT | Mod: GP

## 2025-02-10 NOTE — PROGRESS NOTES
"    Patient Name: Christine Bustos  MRN: [id number]  Today's Date: 2/10/2025  Time Calculation  Start Time: 1615  Stop Time: 1700  Time Calculation (min): 45 min  PT Therapeutic Procedures Time Entry  Manual Therapy Time Entry: 15  Therapeutic Exercise Time Entry: 25        Visit: 11/MN (67 used in 2024)    Diagnosis:   1. Femoroacetabular impingement of left hip  Follow Up In Physical Therapy            PRECAUTIONS:  Left hip arthroscopy 3/26/24    SUBJECTIVE:  Patient continues to feel like she is still making steady progress     HEP performed? Y  Pain: 0/10 anterior left hip     OBJECTIVE:  Left hip strength: hip flexion 4-/5, abd 4-/5, ER 4-/5 (in available ROM)    TREATMENT:  Bike x 5 min  Banded side steps 3 x 45\"   BL shuttle press 1 x 15 (25#)   BL shuttle hops 2 x 10 (18#)  Agility ladder ( one foot, two feet, lateral two feet)       - Manual Therapy:  STM/DTM to R hip adductor, rectus, hip flexor, and lateral hip musculature  LD with ER      ASSESSMENT:  Patient tolerated session well. Pt was able to continue to work on plyometric activities without any increase in hip pain. Pt did verbalize that she felt some stiffness when going into abduction on the agility ladder. Pt continues to progress towards goals established in the POC and should continue with skilled therapy.            PLAN:  increase Leg Press wt if tolerated well.  Assess response to above strengthening and progress as able.  "

## 2025-02-11 NOTE — PROGRESS NOTES
"    Patient Name: Christine Bustos  MRN: [id number]  Today's Date: 2/12/2025  Time Calculation  Start Time: 0415  Stop Time: 0500  Time Calculation (min): 45 min  PT Therapeutic Procedures Time Entry  Manual Therapy Time Entry: 15  Therapeutic Exercise Time Entry: 25        Visit: 12/MN (67 used in 2024)    Diagnosis:   1. Left hip pain        2. Femoroacetabular impingement of left hip  Follow Up In Physical Therapy            PRECAUTIONS:  Left hip arthroscopy 3/26/24    SUBJECTIVE:  Patient continues to feel like she is still making steady progress.  Im feeling alright!    HEP performed? Y  Pain: 0/10 anterior left hip     OBJECTIVE:  Left hip strength: hip flexion 4-/5, abd 4-/5, ER 4-/5 (in available ROM)    TREATMENT:  Bike x 5 min  Banded side steps 3 x 45\"   BL shuttle press 1 x 15 (25#  SL shuttle hops 2 x 10 (18#)  Reverse lunge 2 x 10       - Manual Therapy:  STM/DTM to R hip adductor, rectus, hip flexor, and lateral hip musculature  LD with ER      ASSESSMENT:  Patient tolerated session well. Pt was able to continue to work on plyometric activities withincreased resistance without  hip pain. She was challenged with reverse lunges due to weakness. Pt continues to progress towards goals established in the POC and should continue with skilled therapy.            PLAN:  increase Leg Press wt if tolerated well.  Assess response to above strengthening and progress as able.  "

## 2025-02-12 ENCOUNTER — TREATMENT (OUTPATIENT)
Dept: PHYSICAL THERAPY | Facility: CLINIC | Age: 40
End: 2025-02-12
Payer: COMMERCIAL

## 2025-02-12 DIAGNOSIS — M25.852 FEMOROACETABULAR IMPINGEMENT OF LEFT HIP: ICD-10-CM

## 2025-02-12 DIAGNOSIS — M25.552 LEFT HIP PAIN: Primary | ICD-10-CM

## 2025-02-12 PROCEDURE — 97140 MANUAL THERAPY 1/> REGIONS: CPT | Mod: GP,CQ

## 2025-02-12 PROCEDURE — 97110 THERAPEUTIC EXERCISES: CPT | Mod: GP,CQ

## 2025-02-14 ENCOUNTER — OFFICE VISIT (OUTPATIENT)
Dept: ORTHOPEDIC SURGERY | Facility: HOSPITAL | Age: 40
End: 2025-02-14
Payer: COMMERCIAL

## 2025-02-14 DIAGNOSIS — S73.192D TEAR OF LEFT ACETABULAR LABRUM, SUBSEQUENT ENCOUNTER: Primary | ICD-10-CM

## 2025-02-14 PROCEDURE — 1036F TOBACCO NON-USER: CPT | Performed by: SPECIALIST/TECHNOLOGIST

## 2025-02-14 PROCEDURE — 99213 OFFICE O/P EST LOW 20 MIN: CPT | Performed by: SPECIALIST/TECHNOLOGIST

## 2025-02-14 NOTE — PROGRESS NOTES
The patient returns approximately 11 months out from their Left hip arthroscopy on 3/26/2024.  Overall, she is feeling better.  She feels that transitioning her physical therapy to our [facility] office has significantly improved her symptoms.  She denies adverse events or issues since her last visit.  She continues with formal physical therapy twice weekly and is improving.    The patient and I discussed her clinical presentation 11 months status post left hip arthroscopy.  Overall she is better.  She does have some residual tightness with external rotation at approximately 5 degrees however this is pain-free.  Her strength is improving.  I encouraged her to continue with formal physical therapy as she begins to transition towards plyometric and running activities.  She does desire to return back to running and jumping.  She feels that there has been some improvements with her running and jumping since starting with her therapist.  Over-the-counter analgesia may be utilized as needed.  She will follow-up in 3 months for clinical recheck.  She is in agreement the plan.  Her questions have been answered.      [place] Acuna PA-C

## 2025-02-17 ENCOUNTER — TREATMENT (OUTPATIENT)
Dept: PHYSICAL THERAPY | Facility: CLINIC | Age: 40
End: 2025-02-17
Payer: COMMERCIAL

## 2025-02-17 DIAGNOSIS — M25.852 FEMOROACETABULAR IMPINGEMENT OF LEFT HIP: ICD-10-CM

## 2025-02-17 PROCEDURE — 97140 MANUAL THERAPY 1/> REGIONS: CPT | Mod: GP

## 2025-02-17 PROCEDURE — 97110 THERAPEUTIC EXERCISES: CPT | Mod: GP

## 2025-02-17 NOTE — PROGRESS NOTES
Patient Name: Christine Bustos  MRN: [id number]  Today's Date: 2/17/2025  Time Calculation  Start Time: 1600  Stop Time: 1645  Time Calculation (min): 45 min  PT Therapeutic Procedures Time Entry  Manual Therapy Time Entry: 18  Therapeutic Exercise Time Entry: 23        Visit: 13/MN (67 used in 2024)    Diagnosis:   1. Femoroacetabular impingement of left hip  Follow Up In Physical Therapy            PRECAUTIONS:  Left hip arthroscopy 3/26/24    SUBJECTIVE:  Patient has continued to make good progress with plyometrics. Pt has continued to have no increase in hip pain    HEP performed? Y  Pain: 0/10 anterior left hip     OBJECTIVE:  Left hip strength: hip flexion 4-/5, abd 4-/5, ER 4-/5 (in available ROM)    TREATMENT:  Bike x 5 min  Seated piriformis stretch 2 x 30 sec   TRX split squat 3 x 6   Sportcord SL land 2 x 10   Sportcord skater hops 2 x 10         - Manual Therapy:  STM/DTM to R hip adductor, rectus, hip flexor, and lateral hip musculature  LD with ER  Prone PA mob in son       ASSESSMENT:  Patient tolerated session well. Pt was able to continue to progress impact through her L LE. Pt needed cueing to maintain proper form and balance when performing sportcord activities. Pt continues to progress towards goals established in the POC and should continue with skilled therapy.        PLAN:  increase Leg Press wt if tolerated well.  Assess response to above strengthening and progress as able.

## 2025-02-18 NOTE — PROGRESS NOTES
"    Patient Name: Christine Bustos  MRN: [id number]  Today's Date: 2/19/2025  Time Calculation  Start Time: 0500  Stop Time: 0545  Time Calculation (min): 45 min  PT Therapeutic Procedures Time Entry  Manual Therapy Time Entry: 15  Therapeutic Exercise Time Entry: 20  PT Modalities Time Entry  E-Stim (Attended) Time Entry: 10     Visit: 14/MN (67 used in 2024)    Diagnosis:   1. Femoroacetabular impingement of left hip  Follow Up In Physical Therapy            PRECAUTIONS:  Left hip arthroscopy 3/26/24    SUBJECTIVE:  Patient reports that she was at the gym just resting in a butterfly position on leg press and moved and felt a tearing sensation  but was ok after that.  No residual pain.     HEP performed? Y  Pain: 0/10 anterior left hip     OBJECTIVE:  Left hip strength: hip flexion 4-/5, abd 4-/5, ER 4-/5 (in available ROM)    TREATMENT:  Bike x 5 min  Hex Bar 30# + bar 3 x 8   TRX split squat 3 x 8  8\"runners step up with 4# mediball chop      - Manual Therapy:  STM/DTM to L hip adductor, rectus, hip flexor, and lateral hip musculature      Thermaprobe x 10 min to L glut with 5 bar heating     ASSESSMENT:  Patient tolerated session well. She continues to have tightness and tenderness of glut that decreased slightly with thermaprobe. Pt was able to continue to progress impact through her L LE. Pt continues to progress towards goals established in the POC and should continue with skilled therapy.        PLAN: Attempt regular [facility] split squats next week  Assess response to above strengthening and progress as able.  "

## 2025-02-19 ENCOUNTER — TREATMENT (OUTPATIENT)
Dept: PHYSICAL THERAPY | Facility: CLINIC | Age: 40
End: 2025-02-19
Payer: COMMERCIAL

## 2025-02-19 DIAGNOSIS — M25.852 FEMOROACETABULAR IMPINGEMENT OF LEFT HIP: Primary | ICD-10-CM

## 2025-02-19 PROCEDURE — 97032 APPL MODALITY 1+ESTIM EA 15: CPT | Mod: GP,CQ

## 2025-02-19 PROCEDURE — 97140 MANUAL THERAPY 1/> REGIONS: CPT | Mod: GP,CQ

## 2025-02-19 PROCEDURE — 97110 THERAPEUTIC EXERCISES: CPT | Mod: GP,CQ

## 2025-02-20 ENCOUNTER — APPOINTMENT (OUTPATIENT)
Dept: PRIMARY CARE | Facility: CLINIC | Age: 40
End: 2025-02-20
Payer: COMMERCIAL

## 2025-02-20 VITALS
BODY MASS INDEX: 18.94 KG/M2 | SYSTOLIC BLOOD PRESSURE: 98 MMHG | WEIGHT: 125 LBS | HEIGHT: 68 IN | DIASTOLIC BLOOD PRESSURE: 62 MMHG

## 2025-02-20 DIAGNOSIS — Q21.12 PATENT FORAMEN OVALE (HHS-HCC): ICD-10-CM

## 2025-02-20 DIAGNOSIS — Z00.00 ROUTINE MEDICAL EXAM: Primary | ICD-10-CM

## 2025-02-20 DIAGNOSIS — Z12.31 ENCOUNTER FOR SCREENING MAMMOGRAM FOR MALIGNANT NEOPLASM OF BREAST: ICD-10-CM

## 2025-02-20 PROBLEM — R22.31 AXILLARY LUMP, RIGHT: Status: RESOLVED | Noted: 2024-07-16 | Resolved: 2025-02-20

## 2025-02-20 PROBLEM — D72.819 LEUKOPENIA: Status: RESOLVED | Noted: 2024-07-16 | Resolved: 2025-02-20

## 2025-02-20 PROBLEM — R06.02 SOB (SHORTNESS OF BREATH): Status: RESOLVED | Noted: 2021-06-23 | Resolved: 2025-02-20

## 2025-02-20 PROBLEM — R59.0 AXILLARY LYMPHADENOPATHY: Status: RESOLVED | Noted: 2024-01-19 | Resolved: 2025-02-20

## 2025-02-20 PROBLEM — H61.22 IMPACTED CERUMEN OF LEFT EAR: Status: RESOLVED | Noted: 2024-07-16 | Resolved: 2025-02-20

## 2025-02-20 PROCEDURE — 99395 PREV VISIT EST AGE 18-39: CPT | Performed by: FAMILY MEDICINE

## 2025-02-20 PROCEDURE — 3008F BODY MASS INDEX DOCD: CPT | Performed by: FAMILY MEDICINE

## 2025-02-20 PROCEDURE — 1036F TOBACCO NON-USER: CPT | Performed by: FAMILY MEDICINE

## 2025-02-20 ASSESSMENT — PATIENT HEALTH QUESTIONNAIRE - PHQ9
1. LITTLE INTEREST OR PLEASURE IN DOING THINGS: NOT AT ALL
SUM OF ALL RESPONSES TO PHQ9 QUESTIONS 1 AND 2: 0
2. FEELING DOWN, DEPRESSED OR HOPELESS: NOT AT ALL

## 2025-02-20 NOTE — PROGRESS NOTES
pt has regular dental visits. no vision problems. no hearing loss.   Lifestyle: healthy diet. no weight concerns. Pt exercises regularly. no tobacco or alcohol abuse.   Safety elements used: seat belt,  smoke detector at home.   No passive smoke exposure, chemical abuse, domestic violence, anxiety symptoms, depression symptoms.  Pt has  safe driving habits. No tuberculosis exposure.   Reproductive health: the patient is premenopausal. she reports normal menses.  she is sexually active. pt has safe sexual behavior  Cervical cancer screening:. patient has + history of an abnormal pap smear.     Review of Systems  At least 10/14 systems were reviewed and were negative except left hip pain    Physical Exam  Constitutional: Alert and in no acute distress. Well developed, well nourished.   Head and Face: Normal.    Eyes: Normal external exam. Pupils: normal size and EOMI. No sclera icterus  Ears, Nose, Mouth, and Throat: External inspection of ears and nose: Normal.  Hearing: Normal.  Nasal mucosa: Normal.  Oropharynx: Normal.    Neck: Supple. No neck mass was observed. Thyroid not enlarged  Cardiovascular: Heart rate and rhythm were normal. Pedal pulses: Normal. No peripheral edema.   Pulmonary: No respiratory distress. Clear bilateral breath sounds.   Chest wall: Normal.    Abdomen: Soft,  nontender; no abdominal mass palpated. No organomegaly. Normal BS.  Musculoskeletal: Gait and station: Normal. No joint effusion, decreased  strength and rom at left leg and hip.    Skin: Normal skin color and pigmentation, normal skin turgor,  no rash.   Neurologic: Cranial nerves 2-12 grossly intact.  Sensation: Normal.   Psychiatric: Judgment and insight: Intact. Alert and oriented x 3. Recent and remote memory: Normal.  Mood and affect: Normal.   Lymphatic: No cervical lymphadenopathy.     unremarkable PE,  Recommend DASH diet and regular exercise. check CBC, CMP, lipid, TSH.  Advise eye exam by an OD yearly and dental exam every  6 months. will monitor lipid and weight yearly. Recommend Hep C, hepB, HIV test. recommend    hepB, flu,  covid shot. We will call pt regarding lab results. f/u as scheduled.    Recommend mammogram and pap smear. Pt prefers to see her Gynecologist for evaluation.

## 2025-02-24 ENCOUNTER — TREATMENT (OUTPATIENT)
Dept: PHYSICAL THERAPY | Facility: CLINIC | Age: 40
End: 2025-02-24
Payer: COMMERCIAL

## 2025-02-24 DIAGNOSIS — M25.852 FEMOROACETABULAR IMPINGEMENT OF LEFT HIP: ICD-10-CM

## 2025-02-24 PROCEDURE — 97140 MANUAL THERAPY 1/> REGIONS: CPT | Mod: GP | Performed by: PHYSICAL THERAPIST

## 2025-02-24 PROCEDURE — 97110 THERAPEUTIC EXERCISES: CPT | Mod: GP | Performed by: PHYSICAL THERAPIST

## 2025-02-24 NOTE — PROGRESS NOTES
Patient Name: Christine Bustos  MRN: [id number]  Today's Date: 2/24/2025  Time Calculation  Start Time: 1600  Stop Time: 1650  Time Calculation (min): 50 min  PT Therapeutic Procedures Time Entry  Manual Therapy Time Entry: 30  Therapeutic Exercise Time Entry: 12        Visit: 15/MN (67 used in 2024)    Diagnosis:   1. Femoroacetabular impingement of left hip  Follow Up In Physical Therapy            PRECAUTIONS:  Left hip arthroscopy 3/26/24    SUBJECTIVE:  Patient reports that she was at the gym just resting in a butterfly position on leg press and moved and felt a tearing sensation  but was ok after that.  No residual pain.     HEP performed? Y  Pain: 0/10 anterior left hip   Pain at worst: 3/10 anterior hip.     OBJECTIVE:  2/24/25: HHD MMT R L L % deficit R  Hip flex Supine 90/90 38 lbs. 14 lbs. 63% deficit  Hip Add supine 24 lbs. 13 lbs. 46% deficit  Hip ABD supine 25 lbs.  9.5 lbs. 62% deficit  Hip EXT prone belt 27 lbs. 9 lbs. 66% deficit  With HHD on mid calf  Hip Outcome Score ADL: 42/68    TREATMENT:  Bike x 5 min  HHS objective testing  Outcome Score  Pt education on home program compliance and RPE for sets and reps and exercise dosing.       - Manual Therapy:  STM/DTM to L hip adductor, rectus, hip flexor, and lateral hip musculature      ASSESSMENT:  Please se objective measures for patient functional strength deficits. Pt is progressing well in terms of tolerance to functional activities but limited due to significant lack of strength in L LE. Recommend continued skilled care to address impairments.       PLAN: Attempt regular [place] split squats next week  Assess response to above strengthening and progress as able.  Goals assessed 2/24/25  The patient will report an increase in HOS score to 54/68 Demonstrating a clinically significant change. In 8 weeks.  The patient will report a decrease in pain at best to 0/10 and at worst to 1/10 to demonstrate improvement in quality of life in 8 weeks.   Pt  will demonstrate strength deficits with HHD <15% in 8 weeks to improve functional tolerance to standing, walking, squatting, and stair climbing.

## 2025-02-25 NOTE — PROGRESS NOTES
Patient Name: Christine Bustos  MRN: [id number]  Today's Date: 2/26/2025  Time Calculation  Start Time: 0415  Stop Time: 0500  Time Calculation (min): 45 min  PT Therapeutic Procedures Time Entry  Manual Therapy Time Entry: 30  Therapeutic Exercise Time Entry: 15        Visit: 16/MN (67 used in 2024)    Diagnosis:   1. Left hip pain        2. Femoroacetabular impingement of left hip  Follow Up In Physical Therapy            PRECAUTIONS:  Left hip arthroscopy 3/26/24    SUBJECTIVE:  Patient reports that she is working on strengthening at home.  The massages help when I come here.  Is scheduled 1x week for 6 weeks    HEP performed? Y  Pain: 0/10 anterior left hip   Pain at worst: 3/10 anterior hip.     OBJECTIVE:  2/24/25: HHD MMT R L L % deficit R  Hip flex Supine 90/90 38 lbs. 14 lbs. 63% deficit  Hip Add supine 24 lbs. 13 lbs. 46% deficit  Hip ABD supine 25 lbs.  9.5 lbs. 62% deficit  Hip EXT prone belt 27 lbs. 9 lbs. 66% deficit  With HHD on mid calf  Hip Outcome Score ADL: 42/68    TREATMENT:  Bike x 5 min  Resisted adduction sliders RTB x 10   Outer hip series x 10 each  Standing wall abd with ball x 10   Standing resisted abduction x 10       - Manual Therapy:  STM/DTM to L hip adductor, rectus, hip flexor, and lateral hip musculature  IASTM with 1/2 moon to quad, glut    ASSESSMENT:  Patient tolerated session well.  She felt good adductor work with new resisted adduction slides but she has difficulty firing gluts.   Recommend continued skilled care to address impairments.     Continue to progress glut strengthening ex that fires gluts well.   Assess response to above strengthening and progress as able.  Goals assessed 2/24/25  The patient will report an increase in HOS score to 54/68 Demonstrating a clinically significant change. In 8 weeks.  The patient will report a decrease in pain at best to 0/10 and at worst to 1/10 to demonstrate improvement in quality of life in 8 weeks.   Pt will demonstrate strength  deficits with HHD <15% in 8 weeks to improve functional tolerance to standing, walking, squatting, and stair climbing.

## 2025-02-26 ENCOUNTER — TREATMENT (OUTPATIENT)
Dept: PHYSICAL THERAPY | Facility: CLINIC | Age: 40
End: 2025-02-26
Payer: COMMERCIAL

## 2025-02-26 DIAGNOSIS — M25.552 LEFT HIP PAIN: Primary | ICD-10-CM

## 2025-02-26 DIAGNOSIS — M25.852 FEMOROACETABULAR IMPINGEMENT OF LEFT HIP: ICD-10-CM

## 2025-02-26 PROCEDURE — 97110 THERAPEUTIC EXERCISES: CPT | Mod: GP,CQ

## 2025-02-26 PROCEDURE — 97140 MANUAL THERAPY 1/> REGIONS: CPT | Mod: GP,CQ

## 2025-03-03 ENCOUNTER — OFFICE VISIT (OUTPATIENT)
Dept: URGENT CARE | Age: 40
End: 2025-03-03
Payer: COMMERCIAL

## 2025-03-03 ENCOUNTER — TREATMENT (OUTPATIENT)
Dept: PHYSICAL THERAPY | Facility: CLINIC | Age: 40
End: 2025-03-03
Payer: COMMERCIAL

## 2025-03-03 VITALS
HEIGHT: 68 IN | HEART RATE: 74 BPM | DIASTOLIC BLOOD PRESSURE: 76 MMHG | OXYGEN SATURATION: 97 % | RESPIRATION RATE: 17 BRPM | WEIGHT: 120 LBS | TEMPERATURE: 98.2 F | BODY MASS INDEX: 18.19 KG/M2 | SYSTOLIC BLOOD PRESSURE: 118 MMHG

## 2025-03-03 DIAGNOSIS — M25.852 FEMOROACETABULAR IMPINGEMENT OF LEFT HIP: ICD-10-CM

## 2025-03-03 DIAGNOSIS — R30.0 DYSURIA: ICD-10-CM

## 2025-03-03 DIAGNOSIS — N76.0 ACUTE VAGINITIS: Primary | ICD-10-CM

## 2025-03-03 LAB
POC APPEARANCE, URINE: CLEAR
POC BILIRUBIN, URINE: NEGATIVE
POC BLOOD, URINE: NEGATIVE
POC COLOR, URINE: YELLOW
POC GLUCOSE, URINE: NEGATIVE MG/DL
POC KETONES, URINE: NEGATIVE MG/DL
POC LEUKOCYTES, URINE: NEGATIVE
POC NITRITE,URINE: NEGATIVE
POC PH, URINE: 6 PH
POC PROTEIN, URINE: NEGATIVE MG/DL
POC SPECIFIC GRAVITY, URINE: 1.02
POC UROBILINOGEN, URINE: 0.2 EU/DL
PREGNANCY TEST URINE, POC: NEGATIVE

## 2025-03-03 PROCEDURE — 99213 OFFICE O/P EST LOW 20 MIN: CPT | Performed by: STUDENT IN AN ORGANIZED HEALTH CARE EDUCATION/TRAINING PROGRAM

## 2025-03-03 PROCEDURE — 81025 URINE PREGNANCY TEST: CPT | Performed by: STUDENT IN AN ORGANIZED HEALTH CARE EDUCATION/TRAINING PROGRAM

## 2025-03-03 PROCEDURE — 97110 THERAPEUTIC EXERCISES: CPT | Mod: GP

## 2025-03-03 PROCEDURE — 3008F BODY MASS INDEX DOCD: CPT | Performed by: STUDENT IN AN ORGANIZED HEALTH CARE EDUCATION/TRAINING PROGRAM

## 2025-03-03 PROCEDURE — 97140 MANUAL THERAPY 1/> REGIONS: CPT | Mod: GP

## 2025-03-03 PROCEDURE — 1036F TOBACCO NON-USER: CPT | Performed by: STUDENT IN AN ORGANIZED HEALTH CARE EDUCATION/TRAINING PROGRAM

## 2025-03-03 PROCEDURE — 81003 URINALYSIS AUTO W/O SCOPE: CPT | Performed by: STUDENT IN AN ORGANIZED HEALTH CARE EDUCATION/TRAINING PROGRAM

## 2025-03-03 RX ORDER — CLINDAMYCIN HYDROCHLORIDE 300 MG/1
300 CAPSULE ORAL 2 TIMES DAILY
Qty: 14 CAPSULE | Refills: 0 | Status: SHIPPED | OUTPATIENT
Start: 2025-03-03 | End: 2025-03-10

## 2025-03-03 RX ORDER — FLUCONAZOLE 150 MG/1
150 TABLET ORAL ONCE
Qty: 2 TABLET | Refills: 0 | Status: SHIPPED | OUTPATIENT
Start: 2025-03-03 | End: 2025-03-03

## 2025-03-03 NOTE — PROGRESS NOTES
Patient Name: Christine Bustos  MRN: [id number]  Today's Date: 3/3/2025  Time Calculation  Start Time: 1445  Stop Time: 1530  Time Calculation (min): 45 min  PT Therapeutic Procedures Time Entry  Manual Therapy Time Entry: 15  Therapeutic Exercise Time Entry: 25        Visit: 17/MN (67 used in 2024)    Diagnosis:   1. Femoroacetabular impingement of left hip  Follow Up In Physical Therapy            PRECAUTIONS:  Left hip arthroscopy 3/26/24    SUBJECTIVE:  Patient has continued to progress her LE strengthening to 3x per week at the gym. She still is not feeling a muscle burn with her L LE exercises when performing them in the gym. Pt also feels like she is getting as good of a contraction when performing SL exercises in her quad or glutes    HEP performed? Y  Pain: 0/10 anterior left hip   Pain at worst: 3/10 anterior hip.     OBJECTIVE:  2/24/25: HHD MMT R L L % deficit R  Hip flex Supine 90/90 38 lbs. 14 lbs. 63% deficit  Hip Add supine 24 lbs. 13 lbs. 46% deficit  Hip ABD supine 25 lbs.  9.5 lbs. 62% deficit  Hip EXT prone belt 27 lbs. 9 lbs. 66% deficit  With HHD on mid calf  Hip Outcome Score ADL: 42/68    TREATMENT:  Bike x 5 min  SL shuttle press progressing weight at different knee angles (45 to 90) rest taken after each set   8 x 6-10 reps (25,31,37,43,50,62#)        - Manual Therapy:  STM/DTM to L hip adductor, rectus, hip flexor, and lateral hip musculature  IASTM with 1/2 moon to quad, glut    ASSESSMENT:  Patient tolerated session well.  Pt was able to finally feel some type of muscle burn on her last set of SL shuttle press. Pt's R LE was able to perform her final weight of her L LE without any problems for 10+ reps showing continued weakness in her L LE. A message will be sent to Shamar Acuna to get his opinion on her continued weakness of her L LE. Pt continues to progress towards goals established in the POC and should continue with skilled therapy.      Continue to progress glut strengthening ex that  fires gluts well.   Assess response to above strengthening and progress as able.  Goals assessed 2/24/25  The patient will report an increase in HOS score to 54/68 Demonstrating a clinically significant change. In 8 weeks.  The patient will report a decrease in pain at best to 0/10 and at worst to 1/10 to demonstrate improvement in quality of life in 8 weeks.   Pt will demonstrate strength deficits with HHD <15% in 8 weeks to improve functional tolerance to standing, walking, squatting, and stair climbing.

## 2025-03-03 NOTE — PROGRESS NOTES
Subjective   Patient ID: Christine Bustos is a 39 y.o. female. They present today with a chief complaint of Urinary Problem (UTI yeast infection itching smelly discharge 1 week ).    History of Present Illness  Pt presents with vaginal problem. 1 week with itching, fishy odor. States had discharge at beginning of sx that resolved. Tried topical vagisil cream without relief. Had BV in Dec of 2024. No sti concern. Denies fever chills back flank abd pelvic pain abnormal vaginal bleeding vaginal sores or lesions dysuria frequency urgency hematuria.       History provided by:  Patient      Past Medical History  Allergies as of 03/03/2025 - Reviewed 03/03/2025   Allergen Reaction Noted    Ciprofloxacin Other 03/11/2024    Flagyl [metronidazole] Other 03/11/2024    Motrin [ibuprofen] Other 01/15/2024       (Not in a hospital admission)       Past Medical History:   Diagnosis Date    Acute renal failure (CMS-HCC)     as a child    Asthma     exercise or infection induced    Delayed emergence from general anesthesia     The last time I was put under anesthesia was in 2017 or 2018 and they always tell me to not go back to sleep and constantly come over to me because I try going back to sleep    Endometriosis     Fissure, anal 2006    PFO (patent foramen ovale) ([facility])     last checked 2023 checked- ok       Past Surgical History:   Procedure Laterality Date    ADENOIDECTOMY      OTHER SURGICAL HISTORY  01/20/2016    wisdom teeth    OTHER SURGICAL HISTORY  01/20/2016    Laparoscopic Excision Of Ectopic Preg & bilateral tubes    OTHER SURGICAL HISTORY  11/03/2017    Laparos Large Intest Laser Vaporization Endometriotic Tissue    OTHER SURGICAL HISTORY      excision  of endometrial tissue    OTHER SURGICAL HISTORY      right axilla lymph node biopsy - neg    TONSILLECTOMY      TUBAL LIGATION          reports that she has never smoked. She has never used smokeless tobacco. She reports that she does not currently use alcohol. She  "reports that she does not use drugs.    Review of Systems  Review of Systems   Genitourinary:  Positive for vaginal discharge.   All other systems reviewed and are negative.                                 Objective    Vitals:    03/03/25 1603   BP: 118/76   BP Location: Right arm   Patient Position: Sitting   BP Cuff Size: Small adult   Pulse: 74   Resp: 17   Temp: 36.8 °C (98.2 °F)   TempSrc: Oral   SpO2: 97%   Weight: 54.4 kg (120 lb)   Height: 1.727 m (5' 8\")     Patient's last menstrual period was 02/16/2025 (exact date).    Physical Exam  Vitals and nursing note reviewed.   Constitutional:       General: She is not in acute distress.     Appearance: Normal appearance. She is not ill-appearing, toxic-appearing or diaphoretic.   Cardiovascular:      Pulses: Normal pulses.   Pulmonary:      Effort: Pulmonary effort is normal. No respiratory distress.   Genitourinary:     Comments: Declined preferred self swab  Neurological:      Mental Status: She is alert.         Procedures    Point of Care Test & Imaging Results from this visit  Results for orders placed or performed in visit on 03/03/25   POCT pregnancy, urine manually resulted   Result Value Ref Range    Preg Test, Ur Negative Negative   POCT UA Automated manually resulted   Result Value Ref Range    POC Color, Urine Yellow Straw, Yellow, Light-Yellow    POC Appearance, Urine Clear Clear    POC Glucose, Urine NEGATIVE NEGATIVE mg/dl    POC Bilirubin, Urine NEGATIVE NEGATIVE    POC Ketones, Urine NEGATIVE NEGATIVE mg/dl    POC Specific Gravity, Urine 1.020 1.005 - 1.035    POC Blood, Urine NEGATIVE NEGATIVE    POC PH, Urine 6.0 No Reference Range Established PH    POC Protein, Urine NEGATIVE NEGATIVE mg/dl    POC Urobilinogen, Urine 0.2 0.2, 1.0 EU/DL    Poc Nitrite, Urine NEGATIVE NEGATIVE    POC Leukocytes, Urine NEGATIVE NEGATIVE      No results found.    Diagnostic study results (if any) were reviewed by Maggy Alatorre PA-C.    Assessment/Plan "   Allergies, medications, history, and pertinent labs/EKGs/Imaging reviewed by Maggy Alatorre PA-C.     Medical Decision Making  Rx clindamycin to treat BV. Also sent diflucan but will wait to take this until swabs result or if needed after abx. Sent clindamycin--pt states she once had iv flagyl and developed a reaction. She thought she tolerated oral flagyl however when she it back in Dec 2024 for BV she had hives and itching. This was updated in chart.     Orders and Diagnoses  Diagnoses and all orders for this visit:  Acute vaginitis  -     fluconazole (Diflucan) 150 mg tablet; Take 1 tablet (150 mg) by mouth 1 time for 1 dose. May repeat in 7 days from first dose if symptoms persistent  -     clindamycin (Cleocin) 300 mg capsule; Take 1 capsule (300 mg) by mouth 2 times a day for 7 days.  Dysuria  -     POCT pregnancy, urine manually resulted  -     Vaginitis Gram Stain For Bacterial Vaginosis + Yeast  -     POCT UA Automated manually resulted  -     Urine Culture      Medical Admin Record      Patient disposition: Home    Electronically signed by Maggy Alatorre PA-C  5:29 PM

## 2025-03-04 LAB — BV SCORE VAG QL: NORMAL

## 2025-03-05 LAB — BACTERIA UR CULT: NORMAL

## 2025-03-07 ENCOUNTER — APPOINTMENT (OUTPATIENT)
Dept: CARDIOLOGY | Facility: HOSPITAL | Age: 40
End: 2025-03-07
Payer: COMMERCIAL

## 2025-03-10 ENCOUNTER — LAB (OUTPATIENT)
Dept: LAB | Facility: HOSPITAL | Age: 40
End: 2025-03-10
Payer: COMMERCIAL

## 2025-03-10 ENCOUNTER — TREATMENT (OUTPATIENT)
Dept: PHYSICAL THERAPY | Facility: CLINIC | Age: 40
End: 2025-03-10
Payer: COMMERCIAL

## 2025-03-10 DIAGNOSIS — D72.819 LEUKOPENIA, UNSPECIFIED TYPE: ICD-10-CM

## 2025-03-10 DIAGNOSIS — M25.852 FEMOROACETABULAR IMPINGEMENT OF LEFT HIP: ICD-10-CM

## 2025-03-10 PROCEDURE — 83540 ASSAY OF IRON: CPT

## 2025-03-10 PROCEDURE — 97140 MANUAL THERAPY 1/> REGIONS: CPT | Mod: GP | Performed by: PHYSICAL THERAPIST

## 2025-03-10 PROCEDURE — 80053 COMPREHEN METABOLIC PANEL: CPT

## 2025-03-10 PROCEDURE — 85025 COMPLETE CBC W/AUTO DIFF WBC: CPT

## 2025-03-10 PROCEDURE — 97110 THERAPEUTIC EXERCISES: CPT | Mod: GP | Performed by: PHYSICAL THERAPIST

## 2025-03-10 PROCEDURE — 82607 VITAMIN B-12: CPT

## 2025-03-10 PROCEDURE — 82728 ASSAY OF FERRITIN: CPT

## 2025-03-10 PROCEDURE — 83550 IRON BINDING TEST: CPT

## 2025-03-10 NOTE — PROGRESS NOTES
"    Patient Name: Christine Bustos  MRN: [id number]  Today's Date: 3/10/2025  Time Calculation  Start Time: 1416  Stop Time: 1505  Time Calculation (min): 49 min  PT Therapeutic Procedures Time Entry  Manual Therapy Time Entry: 20  Therapeutic Exercise Time Entry: 24        Visit: 18/MN (67 used in 2024)    Diagnosis:   1. Femoroacetabular impingement of left hip  Follow Up In Physical Therapy            PRECAUTIONS:  Left hip arthroscopy 3/26/24    SUBJECTIVE:  Patient reports no pain at rest. She states she has minimal sensation while exercising for muscle fatigue. She has been compliant with a home program. She feels her left leg is smaller when looking in a mirror.     HEP performed? Y  Pain: 0/10 anterior left hip   Pain at worst: 3/10 anterior hip.     OBJECTIVE:  3/10/25:   Circumferential:  11\" below ASIS R = L  2.5\" above patella R 39cm L 38.5 cm       2/24/25: HHD MMT R L L % deficit R  Hip flex Supine 90/90 38 lbs. 14 lbs. 63% deficit  Hip Add supine 24 lbs. 13 lbs. 46% deficit  Hip ABD supine 25 lbs.  9.5 lbs. 62% deficit  Hip EXT prone belt 27 lbs. 9 lbs. 66% deficit  With HHD on mid calf  Hip Outcome Score ADL: 42/68    TREATMENT:  Bike x 5 min  SL squat with TRX 3 sets 6-10 reps  SL plank on knee with opp leg ABD 12 x 5\"  SL bridge opp LE straight 6-10 x 3      - Manual Therapy:  STM/DTM to L hip adductor, rectus, hip flexor, and lateral hip musculature  IASTM with 1/2 moon to quad, glut    ASSESSMENT:  Circumferential measurements were within 0.5 cm in her quad. She repoted no muscle fatigue or soreness during exercises related to left lower extremity. She felt muscle fatigue on her opp limb during activities. She demonstrates muscle fasciculations during single leg eccentric training in the quad left. We discussed eccentric loading for home program training to improve muscle recruitment.       Continue to progress glut strengthening ex that fires gluts well.   Establish a complete HEP with eccentric " work and working on meuromuscular re-ed.   Check ABD and flexion AROM and PROM. Pt lacking active hip ABD and flex in standing today.     Goals assessed 2/24/25  The patient will report an increase in HOS score to 54/68 Demonstrating a clinically significant change. In 8 weeks.  The patient will report a decrease in pain at best to 0/10 and at worst to 1/10 to demonstrate improvement in quality of life in 8 weeks.   Pt will demonstrate strength deficits with HHD <15% in 8 weeks to improve functional tolerance to standing, walking, squatting, and stair climbing.

## 2025-03-11 LAB
ALBUMIN SERPL BCP-MCNC: 4.9 G/DL (ref 3.4–5)
ALP SERPL-CCNC: 57 U/L (ref 33–110)
ALT SERPL W P-5'-P-CCNC: 12 U/L (ref 7–45)
ANION GAP SERPL CALC-SCNC: 13 MMOL/L (ref 10–20)
AST SERPL W P-5'-P-CCNC: 16 U/L (ref 9–39)
BASOPHILS # BLD AUTO: 0.04 X10*3/UL (ref 0–0.1)
BASOPHILS NFR BLD AUTO: 0.6 %
BILIRUB SERPL-MCNC: 0.5 MG/DL (ref 0–1.2)
BUN SERPL-MCNC: 14 MG/DL (ref 6–23)
CALCIUM SERPL-MCNC: 9.1 MG/DL (ref 8.6–10.6)
CHLORIDE SERPL-SCNC: 104 MMOL/L (ref 98–107)
CHOLEST SERPL-MCNC: 163 MG/DL
CHOLEST/HDLC SERPL: 2 (CALC)
CO2 SERPL-SCNC: 26 MMOL/L (ref 21–32)
CREAT SERPL-MCNC: 0.82 MG/DL (ref 0.5–1.05)
EGFRCR SERPLBLD CKD-EPI 2021: >90 ML/MIN/1.73M*2
EOSINOPHIL # BLD AUTO: 0.08 X10*3/UL (ref 0–0.7)
EOSINOPHIL NFR BLD AUTO: 1.3 %
ERYTHROCYTE [DISTWIDTH] IN BLOOD BY AUTOMATED COUNT: 12 % (ref 11.5–14.5)
EST. AVERAGE GLUCOSE BLD GHB EST-MCNC: 97 MG/DL
EST. AVERAGE GLUCOSE BLD GHB EST-SCNC: 5.4 MMOL/L
FERRITIN SERPL-MCNC: 195 NG/ML (ref 8–150)
GLUCOSE SERPL-MCNC: 90 MG/DL (ref 74–99)
HBA1C MFR BLD: 5 % OF TOTAL HGB
HCT VFR BLD AUTO: 40.8 % (ref 36–46)
HDLC SERPL-MCNC: 83 MG/DL
HGB BLD-MCNC: 13.4 G/DL (ref 12–16)
HOLD SPECIMEN: NORMAL
HOLD SPECIMEN: NORMAL
IMM GRANULOCYTES # BLD AUTO: 0.02 X10*3/UL (ref 0–0.7)
IMM GRANULOCYTES NFR BLD AUTO: 0.3 % (ref 0–0.9)
IRON SATN MFR SERPL: 38 % (ref 25–45)
IRON SERPL-MCNC: 111 UG/DL (ref 35–150)
LDLC SERPL CALC-MCNC: 66 MG/DL (CALC)
LYMPHOCYTES # BLD AUTO: 1.34 X10*3/UL (ref 1.2–4.8)
LYMPHOCYTES NFR BLD AUTO: 21.8 %
MCH RBC QN AUTO: 31.9 PG (ref 26–34)
MCHC RBC AUTO-ENTMCNC: 32.8 G/DL (ref 32–36)
MCV RBC AUTO: 97 FL (ref 80–100)
MONOCYTES # BLD AUTO: 0.45 X10*3/UL (ref 0.1–1)
MONOCYTES NFR BLD AUTO: 7.3 %
NEUTROPHILS # BLD AUTO: 4.23 X10*3/UL (ref 1.2–7.7)
NEUTROPHILS NFR BLD AUTO: 68.7 %
NONHDLC SERPL-MCNC: 80 MG/DL (CALC)
NRBC BLD-RTO: 0 /100 WBCS (ref 0–0)
PLATELET # BLD AUTO: 187 X10*3/UL (ref 150–450)
POTASSIUM SERPL-SCNC: 4.4 MMOL/L (ref 3.5–5.3)
PROT SERPL-MCNC: 7.3 G/DL (ref 6.4–8.2)
RBC # BLD AUTO: 4.2 X10*6/UL (ref 4–5.2)
SODIUM SERPL-SCNC: 139 MMOL/L (ref 136–145)
TIBC SERPL-MCNC: 294 UG/DL (ref 240–445)
TRIGL SERPL-MCNC: 56 MG/DL
TSH SERPL-ACNC: 1.94 MIU/L
UIBC SERPL-MCNC: 183 UG/DL (ref 110–370)
VIT B12 SERPL-MCNC: 448 PG/ML (ref 211–911)
WBC # BLD AUTO: 6.2 X10*3/UL (ref 4.4–11.3)

## 2025-03-18 ENCOUNTER — OFFICE VISIT (OUTPATIENT)
Dept: HEMATOLOGY/ONCOLOGY | Facility: CLINIC | Age: 40
End: 2025-03-18
Payer: COMMERCIAL

## 2025-03-18 VITALS
WEIGHT: 126.1 LBS | OXYGEN SATURATION: 100 % | RESPIRATION RATE: 16 BRPM | HEART RATE: 62 BPM | DIASTOLIC BLOOD PRESSURE: 88 MMHG | SYSTOLIC BLOOD PRESSURE: 127 MMHG | BODY MASS INDEX: 19.17 KG/M2 | TEMPERATURE: 97.7 F

## 2025-03-18 DIAGNOSIS — E61.1 IRON DEFICIENCY: Primary | ICD-10-CM

## 2025-03-18 DIAGNOSIS — D72.819 LEUKOPENIA, UNSPECIFIED TYPE: ICD-10-CM

## 2025-03-18 PROCEDURE — 99203 OFFICE O/P NEW LOW 30 MIN: CPT

## 2025-03-18 PROCEDURE — 99213 OFFICE O/P EST LOW 20 MIN: CPT

## 2025-03-18 ASSESSMENT — PAIN SCALES - GENERAL: PAINLEVEL_OUTOF10: 2

## 2025-03-18 NOTE — PROGRESS NOTES
"    Patient Name: Christine Bustos  MRN: [id number]  Today's Date: 3/19/2025  Time Calculation  Start Time: 0650  Stop Time: 0745  Time Calculation (min): 55 min  PT Therapeutic Procedures Time Entry  Manual Therapy Time Entry: 25  Therapeutic Exercise Time Entry: 20        Visit: 19/MN (67 used in 2024)    Diagnosis:   1. Femoroacetabular impingement of left hip  Follow Up In Physical Therapy            PRECAUTIONS:  Left hip arthroscopy 3/26/24    SUBJECTIVE:  Patient reports that she is feeling okay.  Not having pain but still doesn't feel muscles working. I have a pinch feeling right at butt crease on L when doing some exercises but dissipates quickly.    HEP performed? Y  Pain: 0/10 anterior left hip   Pain at worst: 3/10 anterior hip.     OBJECTIVE:  3/10/25:   Circumferential:  11\" below ASIS R = L  2.5\" above patella R 39cm L 38.5 cm       2/24/25: HHD MMT R L L % deficit R  Hip flex Supine 90/90 38 lbs. 14 lbs. 63% deficit  Hip Add supine 24 lbs. 13 lbs. 46% deficit  Hip ABD supine 25 lbs.  9.5 lbs. 62% deficit  Hip EXT prone belt 27 lbs. 9 lbs. 66% deficit  With HHD on mid calf  Hip Outcome Score ADL: 42/68    TREATMENT:  Bike x 5 min  SL squat with TRX 3 sets 6-10 reps  Adductor slides RTB 3 x 6       - Manual Therapy:    IASTM with 1/2 moon to quad, glut  LD with belt      ASSESSMENT:  Challenged with adductors. Requires max energy to compete adductor slides and SL squat.  We discussed eccentric loading for home program training to improve muscle recruitment.       Continue to progress glut strengthening ex that fires gluts well.   Establish a complete HEP with eccentric work and working on meuromuscular re-ed.   Check ABD and flexion AROM and PROM. Pt lacking active hip ABD and flex in standing today.     Goals assessed 2/24/25  The patient will report an increase in HOS score to 54/68 Demonstrating a clinically significant change. In 8 weeks.  The patient will report a decrease in pain at best to 0/10 and " at worst to 1/10 to demonstrate improvement in quality of life in 8 weeks.   Pt will demonstrate strength deficits with HHD <15% in 8 weeks to improve functional tolerance to standing, walking, squatting, and stair climbing.

## 2025-03-18 NOTE — PROGRESS NOTES
[facility]    PATIENT VISIT INFORMATION    Visit Type: Follow Up    Referring Provider: Mike Angel MD  Reason for referral: Leukopenia   Primary Practice Provider: Mike Angel MD    HEMATOLGOY HISTORY    39 year old female presents for evaluation of leukopenia. Noted over the last 7 months WBC 3.6-4.1. No other abnormality on CBC.   ~March 2024 hip surgery with slow progression to healing.   ~Covid 12/2020 and 7/2021.   Nerve block to help smell and taste return. Covid recovery clinic d/t long Covid symptoms. Follows pulmonary for management. Cleared by cardiology. HX PFO.   Colonoscopy 2016 d/t rectal bleeding. mild inflammation in rectum. Pathology negative.  Endometriosis diagnosis.   Right axillae lymph node decreasing in size. Appeared 11/2023.   Biopsy show: FRAGMENTS OF LYMPH NODE SHOWING FOLLICULAR HYPERPLASIA WITH NO MORPHOLOGIC EVIDENCE OF LYMPHOMA OR CARCINOMA, SEE NOTE.   Following Surgery most recent ultrasound of asked shows reduced in size lymphadenopathy.    CT chest abdomen pelvis w IV contrast  10/28/2024  IMPRESSION:  1. No lymphadenopathy in the chest abdomen or pelvis.  2. A 4.6 x 3.6 cm exophytic soft tissue density originating from the  right aspect of the uterine fundus with central necrosis. This likely  represents a necrotic fibroid, recommend further evaluation with a  pelvic ultrasound.  3. Similar featureless appearance of the pancreas which may represent  a normal variant. Correlation with laboratory values, serum IgG 4  levels if there is a clinical concern for autoimmune pancreatitis.  4. Incidental Finding: A subcentimeter hypodense right thyroid nodule  with peripheral calcification, likely benign. (**-YCF-**)  instructions:  If there are no clinical risk factors for thyroid  cancer, no further evaluation is recommended. (Managing Incidental  Thyroid Nodules Detected on Imaging: White Paper of the ACR  Incidental Thyroid Findings  Committee. Laura Rivera al. Journal  of the American College of Radiology,Volume 12, Issue 2, 143 - 150.)  [email or web address].ACR.IF.3  5. An indeterminate 3 mm solid noncalcified left lower lobe pulmonary  nodule. Instructions:  No further follow-up is required, however, if  the patient has high risk factors for primary lung malignancy,  follow-up noncontrast CT scan chest in 12 months may be obtained.  (Corbin Lunsford et al., Guidelines for management of incidental  pulmonary nodules detected on CT images: From the Fleischner Society  2017, Radiology. 2017 Jul;284 (1):228-243.) [email or web address]    US AXILLA ONLY RIGHT  10/18/2024  IMPRESSION:   There is no sonographic evidence of malignancy.   Follow up with ACR and NCCN guidelines.     HISTORY OF PRESENT ILLNESS     ID Statement: Christine Bustos is a 39 year old female     Chief Complaint: Follow Up labs    Interval History:   Patient presents for follow up. She followed with gynecology and will monitor fibroids.   She reports night sweats monthly around time of cycle. LMP: currently on cycle. Heavy to light for approx. 5 days. Recent PAP.  History of HPV infection. Appetite decent, no weight loss. Denies fevers, worsening SOB, CP, palpitations, abdominal pain, n/v/c. Has IBS so she has a lot of diarrhea. No urinary complaints. No rashes, sores. Cold hands and feet, finger tips go numb outside. Melasma on her face follows dermatology.   No neurological or breast complaints. Axilla lump right side resolved. No other lumps or bumps.  No other complaints.  Will follow cardiology for PFO.   Improved energy after iron infusion 10/18 and 10/25.     PAST/CURRENT HISTORY     MEDICAL/SURGICAL HISTORY  -leukopenia  -ID  -ARF from chicken pox childhood  -tonsillitis and tonsillectomy   -2015 ruptured ectopic pregnancy   -2016 ectopic pregnancy   -endometriosis   -lap surgery ablation endometriosis   -second endometrial surgery 2017   -removal fallopian tubes  -nasal surgery  nose bleeds in childhood with heavy clots  -menorrhagia   -childhood anemia on iron  -axillary lymph node poor blood flow, reducing     SOCIAL HISTORY  -Lives alone   -Work place: [employer]  and     -Tobacco/smokeless use: Denies   -Alcohol: Denies   -Illicit drug or marijuana use: Denies   -Muslim or Spiritual beliefs: Denies   -Social Determinates of Health Concerns: Denies     FAMILY HISTORY  -Unknown BIO mom (death in her early 40's CHF/drug use)  -Paternal Grandfather living (prostate cancer?), parkinson   -Paternal grandmother CHF,  from stroke  -Paternal first cousin Breast cousin double mastectomy in surveillance, blood issues unknown type  -Paternal-second cousin (daughter of first cousin with BC) leukemia at age of two in remission   -No other known history of hematologic, bleeding, clotting, autoimmune, genetic, or malignant disorders in the family.     OCCUPATIONAL/ENVIRONMENTAL HISTORY/EXPOSURES:  -None reported    Active Problems, Allergy List, Medication List, and PMH/PSH/FH/Social Hx have been reviewed and reconciled in chart. Updates made when necessary.     REVIEW OF SYSTEMS   A review of systems has been completed and are negative for complaints except what is stated in the assessment, HPI, IH, ROS, and/or past medical history.    ALLERGIES AND MEDICATIONS     Allergies and Intolerances:   Allergies   Allergen Reactions    Ciprofloxacin Other     IV only when with flagyl skin was burning and red, oral ok    Flagyl [Metronidazole] Other     With cipro with IV burning skin hot and red, oral ok  Pt states had oral in Dec 2024 and developed hives, itching    Motrin [Ibuprofen] Other     As a  child had acute renal failure      Medication Profile:   Current Outpatient Medications   Medication Instructions    albuterol 90 mcg/actuation inhaler 2 puffs, Every 4 hours PRN    fluticasone (Flonase) 50 mcg/actuation nasal spray 2 sprays, Each Nostril, Daily,  "Shake gently. Before first use, prime pump. After use, clean tip and replace cap.    Pulmicort Flexhaler 180 mcg/actuation inhaler 1 puff, 2 times daily      Available Vaccination Record:   Immunization History   Administered Date(s) Administered    Hepatitis B vaccine, 19 yrs and under (RECOMBIVAX, ENGERIX) 09/16/1998    Tdap vaccine, age 7 year and older (BOOSTRIX, ADACEL) 08/18/2023      PHYSICAL EXAM     Vital Signs/Measurements:       10/31/2024    12:02 PM 11/6/2024     3:33 PM 12/26/2024     5:17 PM 1/9/2025     2:16 PM 2/20/2025     9:32 AM 3/3/2025     4:03 PM 3/18/2025    10:03 AM   Vitals   Systolic 119 112 139 120 98 118 127   Diastolic 82 77 87 62 62 76 88   BP Location  Left arm    Right arm Left arm   Heart Rate 80 59 67   74 62   Temp  36.4 °C (97.5 °F) 36.4 °C (97.5 °F)   36.8 °C (98.2 °F) 36.5 °C (97.7 °F)   Resp  17 16   17 16   Height    1.702 m (5' 7\") 1.727 m (5' 8\") 1.727 m (5' 8\")    Weight (lb) 123.4 123.68 120 127 125 120 126.1   BMI 19.16 kg/m2 19.21 kg/m2 18.64 kg/m2 19.89 kg/m2 19.01 kg/m2 18.25 kg/m2 19.17 kg/m2   BSA (m2) 1.63 m2 1.63 m2 1.61 m2 1.65 m2 1.65 m2 1.62 m2 1.66 m2   Visit Report Report Report Report Report Report Report Report        Performance:   ECOG Performance Status: 0     Grade ECOG performance status   0 Fully active, able to carry on all pre-disease performance without restriction   1 Restricted in physically strenuous activity but ambulatory and able to carry out work of a light or sedentary nature, e.g., light housework, office work   2 Ambulatory and capable of all selfcare but unable to carry out any work activities; Up and about more than 50% of waking hours   3 Capable of only limited selfcare, confined to bed or chair more than 50% of waking hours   4 Completely disabled; Cannot carry out any selfcare; Totally confined to bed or chair   5 Dead     Physical Exam:  Physical Exam  Constitutional:       Appearance: Normal appearance. She is normal weight. "   HENT:      Head: Normocephalic and atraumatic.      Right Ear: External ear normal.      Left Ear: External ear normal.      Nose: Nose normal.      Mouth/Throat:      Mouth: Mucous membranes are dry.   Eyes:      Extraocular Movements: Extraocular movements intact.      Pupils: Pupils are equal, round, and reactive to light.   Cardiovascular:      Rate and Rhythm: Normal rate and regular rhythm.      Pulses: Normal pulses.      Heart sounds: Normal heart sounds.   Pulmonary:      Effort: Pulmonary effort is normal.      Breath sounds: Normal breath sounds.   Abdominal:      General: Abdomen is flat. Bowel sounds are normal.   Musculoskeletal:         General: Normal range of motion.      Cervical back: Normal range of motion.   Skin:     General: Skin is warm and dry.      Capillary Refill: Capillary refill takes less than 2 seconds.   Neurological:      General: No focal deficit present.      Mental Status: She is alert and oriented to person, place, and time.   Psychiatric:         Mood and Affect: Mood normal.         Behavior: Behavior normal.         Thought Content: Thought content normal.         Judgment: Judgment normal.        RESULTS/DATA     Labs:     Lab Results   Component Value Date    WBC 6.2 03/10/2025    NEUTROABS 4.23 03/10/2025    IGABSOL 0.02 03/10/2025    LYMPHSABS 1.34 03/10/2025    MONOSABS 0.45 03/10/2025    EOSABS 0.08 03/10/2025    BASOSABS 0.04 03/10/2025    RBC 4.20 03/10/2025    MCV 97 03/10/2025    MCHC 32.8 03/10/2025    HGB 13.4 03/10/2025    HCT 40.8 03/10/2025     03/10/2025       Lab Results   Component Value Date    CREATININE 0.82 03/10/2025    BUN 14 03/10/2025    EGFR >90 03/10/2025     03/10/2025    K 4.4 03/10/2025     03/10/2025    CO2 26 03/10/2025      Lab Results   Component Value Date    ALT 12 03/10/2025    AST 16 03/10/2025    ALKPHOS 57 03/10/2025    BILITOT 0.5 03/10/2025      Lab Results   Component Value Date    TSH 1.94 03/10/2025     Lab  "Results   Component Value Date    TSH 1.94 03/10/2025   Labs:  Lab Results   Component Value Date    WBC 6.2 03/10/2025    NEUTROABS 4.23 03/10/2025    IGABSOL 0.02 03/10/2025    LYMPHSABS 1.34 03/10/2025    MONOSABS 0.45 03/10/2025    EOSABS 0.08 03/10/2025    BASOSABS 0.04 03/10/2025    RBC 4.20 03/10/2025    MCV 97 03/10/2025    MCHC 32.8 03/10/2025    HGB 13.4 03/10/2025    HCT 40.8 03/10/2025     03/10/2025     Lab Results   Component Value Date    RETICCTPCT 2.1 (H) 10/09/2024      Lab Results   Component Value Date    CREATININE 0.82 03/10/2025    BUN 14 03/10/2025    EGFR >90 03/10/2025     03/10/2025    K 4.4 03/10/2025     03/10/2025    CO2 26 03/10/2025      Lab Results   Component Value Date    ALT 12 03/10/2025    AST 16 03/10/2025    ALKPHOS 57 03/10/2025    BILITOT 0.5 03/10/2025      Lab Results   Component Value Date    TSH 1.94 03/10/2025     Lab Results   Component Value Date    TSH 1.94 03/10/2025     Lab Results   Component Value Date    IRON 111 03/10/2025    TIBC 294 03/10/2025    FERRITIN 195 (H) 03/10/2025      Lab Results   Component Value Date    XJHBRXBQ18 448 03/10/2025      Lab Results   Component Value Date    FOLATE 13.3 10/09/2024     Lab Results   Component Value Date    KAYLA Negative 12/10/2024    SEDRATE 8 10/09/2024      Lab Results   Component Value Date    CRP <0.10 10/09/2024      No results found for: \"MICHAEL\"  Lab Results   Component Value Date     10/09/2024          Radiology/Studies:   US AXILLA ONLY RIGHT  4/19/2024  IMPRESSION: PROBABLY BENIGN - SHORT TERM INTERVAL FOLLOW-UP RECOMMENDED   Probable benign findings in the right axilla with continued appearance of   borderline-enlarged right axillary tail lymph nodes.  Minimally improved   since the previous study.   Continued surveillance for stability or resolution is recommended with   repeat right axillary ultrasound within 6 months.   A follow-up ultrasound in 6 months is recommended to " demonstrate   stability.   US pelvis transvaginal 1/17/2025  IMPRESSION:   Stable uterine fibroids.   No sonographic evidence of acute pathology.   ASSESSMENT/PLAN     Assessment and Plan:   #1. Leukopenia   #2. Iron deficiency   38 year old female presents for evaluation of leukopenia. Noted over the last 7 months WBC 3.6-4.1. No other abnormality on CBC.  Discussed leukopenia workup- possible causes including low nutritional values, infections or viruses, bone marrow suppression versus other etiology. Iron deficiency is noted will order IV iron.  Patient unable to tolerate oral iron.  Patient will follow with gynecology for fibroid and biopsy.  Iron deficiency improving we will reassess in 2 months and at order more iron as needed.  Most recent CBC shows a normal white count.    12/18/2024 iron stores showing repleted after IV iron.  CBC unremarkable at this time.  Patient following with her pulmonologist for recent illness.  Follow-up visit 3 months to assess leukopenia and iron deficiency.  Patient will follow with gynecology for uterine fibroid.    03/18/2024  Christine presents for follow-up.  Her CBC is unremarkable, her iron stores are repleted.  She will follow in 6 months and we will repeat nutritional labs.  She will continue B12 oral daily.  She will follow cardiologist for PFO, orthopedic for left hip impingement, pulmonary for asthma and post COVID December 2024, and gynecology for uterine fibroids.  Axillary lymph node has resolved at this time.    If nutritional labs and CBC is stable she may return to primary care follow-up as needed.    **Please follow with specialties as scheduled for other health needs and routine screenings.**    Follow up:    RTC:  -6 months with labs  -IV iron as needed    Medications:  -B12 500 mcg daily     Imaging/Testing:  -NA    Referral:  -NA    Other Pertinent Appointments:  -Cardiology 3/19/2025      Patient Discussion Summary:  Discussed plan of care in detail. Patient  states understanding and in agreement. Answered all questions. They will call with any additional questions and/or concerns.    Thank you for allowing me to participate in your care. It was a pleasure meeting you.    Sincerely,  Yesenia Espitia, APRN-CNP     Hematology/Oncology Clinical Nurse Practitioner     [facility]   [address]  [address]  [place] [id number]  Office #: [phone]    DISCLAIMER:   In preparing for this visit and writing this note, I reviewed all the previous electronic medical records (testing, labs, imaging, and other procedures, provider notes, and medical charts) of the patient available in the physician portal pertinent to patient care. Significant findings which helped in decision making are recorded in this chart.  A few details copied from my note on 12/18/2024, the details have been updated and all reflect current decision making from today, 3/18/2025.    This document may have been written by voice recognition software.  There may be some incorrect wording, spelling and/or spelling errors or punctuation errors that were not corrected prior to committing the note to the medical record. Please request clarification if there is documentation error or clarification is needed.   Time based billing: Please see documentation within this specific encounter.

## 2025-03-19 ENCOUNTER — TREATMENT (OUTPATIENT)
Dept: PHYSICAL THERAPY | Facility: CLINIC | Age: 40
End: 2025-03-19
Payer: COMMERCIAL

## 2025-03-19 DIAGNOSIS — M25.852 FEMOROACETABULAR IMPINGEMENT OF LEFT HIP: Primary | ICD-10-CM

## 2025-03-19 PROCEDURE — 97110 THERAPEUTIC EXERCISES: CPT | Mod: GP,CQ

## 2025-03-19 PROCEDURE — 97140 MANUAL THERAPY 1/> REGIONS: CPT | Mod: GP,CQ

## 2025-03-21 ENCOUNTER — OFFICE VISIT (OUTPATIENT)
Dept: CARDIOLOGY | Facility: HOSPITAL | Age: 40
End: 2025-03-21
Payer: COMMERCIAL

## 2025-03-21 VITALS
HEART RATE: 59 BPM | SYSTOLIC BLOOD PRESSURE: 112 MMHG | WEIGHT: 124.12 LBS | DIASTOLIC BLOOD PRESSURE: 75 MMHG | OXYGEN SATURATION: 98 % | BODY MASS INDEX: 18.87 KG/M2

## 2025-03-21 DIAGNOSIS — Q21.12 PFO (PATENT FORAMEN OVALE) (HHS-HCC): ICD-10-CM

## 2025-03-21 DIAGNOSIS — Q21.12 PATENT FORAMEN OVALE (HHS-HCC): Primary | ICD-10-CM

## 2025-03-21 LAB
ATRIAL RATE: 51 BPM
P AXIS: 64 DEGREES
P OFFSET: 192 MS
P ONSET: 147 MS
PR INTERVAL: 154 MS
Q ONSET: 224 MS
QRS COUNT: 9 BEATS
QRS DURATION: 78 MS
QT INTERVAL: 426 MS
QTC CALCULATION(BAZETT): 392 MS
QTC FREDERICIA: 403 MS
R AXIS: 67 DEGREES
T AXIS: 64 DEGREES
T OFFSET: 437 MS
VENTRICULAR RATE: 51 BPM

## 2025-03-21 PROCEDURE — 93005 ELECTROCARDIOGRAM TRACING: CPT | Performed by: NURSE PRACTITIONER

## 2025-03-21 PROCEDURE — 1036F TOBACCO NON-USER: CPT | Performed by: NURSE PRACTITIONER

## 2025-03-21 PROCEDURE — 99214 OFFICE O/P EST MOD 30 MIN: CPT | Performed by: NURSE PRACTITIONER

## 2025-03-21 PROCEDURE — 99204 OFFICE O/P NEW MOD 45 MIN: CPT | Performed by: NURSE PRACTITIONER

## 2025-03-21 ASSESSMENT — ENCOUNTER SYMPTOMS
EYES NEGATIVE: 1
HEMATOLOGIC/LYMPHATIC NEGATIVE: 1
RESPIRATORY NEGATIVE: 1
GASTROINTESTINAL NEGATIVE: 1
DYSPNEA ON EXERTION: 1
CONSTITUTIONAL NEGATIVE: 1
MUSCULOSKELETAL NEGATIVE: 1
PSYCHIATRIC NEGATIVE: 1
ENDOCRINE NEGATIVE: 1
NEUROLOGICAL NEGATIVE: 1

## 2025-03-21 NOTE — PATIENT INSTRUCTIONS
CALL WITH ANY QUESTIONS   START ASPIRIN 81 MG DAILY   ECHOCARDIOGRAM   WILL CALL TO REVIEW THE RESULTS   FOLLOW UP IN ONE YEAR

## 2025-03-21 NOTE — PROGRESS NOTES
Referred by Dr. Angel for New Patient Visit (Establish care due to cardiac hx.)     History Of Present Illness:      Dear Dr. Angel,     I had the pleasure of meeting Mrs. Bustos today at [facility] to establish cardiac care. The patient is seen in collaboration with Dr. Valdez. Mrs. Bustos is a very pleasant 39 year old female with a history of hip surgery, PFO and asthma, denies history of smoking. Family history of CHF and CVA. She has a history of PFO, here to establish care. Denies heart palpitations, occasional, states she has occasional brief episodes of chest pain. Complains of shortness of breath. Continues to stay active on a regular basis.     Review of Systems   Constitutional: Negative.   HENT: Negative.     Eyes: Negative.    Cardiovascular:  Positive for dyspnea on exertion.   Respiratory: Negative.     Endocrine: Negative.    Hematologic/Lymphatic: Negative.    Skin: Negative.    Musculoskeletal: Negative.    Gastrointestinal: Negative.    Neurological: Negative.    Psychiatric/Behavioral: Negative.          Past Medical History:  She has a past medical history of Acute renal failure ([facility]), Anemia (I had when I was a teenager and after my ruptured ectopic pregnancy but have been fine since), Asthma, Delayed emergence from general anesthesia, Endometriosis, Fissure, anal (2006), and PFO (patent foramen ovale) ([facility]).    Past Surgical History:  She has a past surgical history that includes Other surgical history (01/20/2016); Other surgical history (01/20/2016); Other surgical history (11/03/2017); Tonsillectomy; Adenoidectomy; Other surgical history; Other surgical history; Tubal ligation; and Hip surgery (I had hip impingement and a torn labrum so they had to shave my bone to round it and repair my labrum. This was done arthroscopic).      Social History:  She reports that she has never smoked. She has never used smokeless tobacco. She reports that she does not  currently use alcohol. She reports that she does not use drugs.    Family History:  Family History   Problem Relation Name Age of Onset    Heart disease Mother Radha     Cancer Paternal Grandfather Mike Bustos     Stroke Paternal Grandmother Jazmin Bustos     Alcohol abuse Brother Robert Bustos         Allergies:  Ciprofloxacin, Flagyl [metronidazole], and Motrin [ibuprofen]    Outpatient Medications:  Current Outpatient Medications   Medication Instructions    albuterol 90 mcg/actuation inhaler 2 puffs, Every 4 hours PRN    fluticasone (Flonase) 50 mcg/actuation nasal spray 2 sprays, Each Nostril, Daily, Shake gently. Before first use, prime pump. After use, clean tip and replace cap.    Pulmicort Flexhaler 180 mcg/actuation inhaler 1 puff, 2 times daily        Last Recorded Vitals:  Vitals:    03/21/25 1002   BP: 112/75   Pulse: 59   SpO2: 98%   Weight: 56.3 kg (124 lb 1.9 oz)       Physical Exam:  Physical Exam  Vitals reviewed.   HENT:      Head: Normocephalic.      Nose: Nose normal.   Eyes:      Pupils: Pupils are equal, round, and reactive to light.   Cardiovascular:      Rate and Rhythm: Normal rate and regular rhythm.   Pulmonary:      Effort: Pulmonary effort is normal.      Breath sounds: Normal breath sounds.   Abdominal:      General: Abdomen is flat.      Palpations: Abdomen is soft.   Musculoskeletal:         General: Normal range of motion.      Cervical back: Normal range of motion.   Skin:     General: Skin is warm and dry.   Neurological:      General: No focal deficit present.      Mental Status: He is alert and oriented to person, place, and time.   Psychiatric:         Mood and Affect: Mood normal.       Physical Exam               Last Labs:  CBC -  Lab Results   Component Value Date    WBC 6.2 03/10/2025    HGB 13.4 03/10/2025    HCT 40.8 03/10/2025    MCV 97 03/10/2025     03/10/2025       CMP -  Lab Results   Component Value Date    CALCIUM 9.1 03/10/2025    PROT 7.3 03/10/2025     ALBUMIN 4.9 03/10/2025    AST 16 03/10/2025    ALT 12 03/10/2025    ALKPHOS 57 03/10/2025    BILITOT 0.5 03/10/2025       LIPID PANEL -   Lab Results   Component Value Date    CHOL 163 03/10/2025    TRIG 56 03/10/2025    HDL 83 03/10/2025    CHHDL 2.0 03/10/2025    VLDL 17 03/05/2024    NHDL 80 03/10/2025       RENAL FUNCTION PANEL -   Lab Results   Component Value Date    GLUCOSE 90 03/10/2025     03/10/2025    K 4.4 03/10/2025     03/10/2025    CO2 26 03/10/2025    ANIONGAP 13 03/10/2025    BUN 14 03/10/2025    CREATININE 0.82 03/10/2025    CALCIUM 9.1 03/10/2025    ALBUMIN 4.9 03/10/2025        Lab Results   Component Value Date    HGBA1C 5.0 03/10/2025       Last Cardiology Tests:  ECG:  ECG independently reviewed from today showed sinus bradycardia heart rate 51 bpm   Echo:  Echocardiogram 1/11/2023 [facility]   xam indication: PFO   - Left ventricular systolic function is normal. EF = 60 ± 5% (visual est.)   - The right ventricle is normal in size. Right ventricular systolic function is   normal.   - There are no significant valvular abnormalities.   - There is a patent foramen ovale as detected by agitated saline contrast and   Doppler.   - Moderate tunneled PFO with aneurysmal intraatrial septum. Tunneled PFO length   1.1cm and 0.3cm in height.   - Exam was compared with the prior  echocardiographic exam performed on   12/14/22. There is no significant change.   Ejection Fractions:  LVEF 60%  Cath:    Stress Test:    Cardiac Imaging:      Assessment/Plan   Mrs. Bustos is a very pleasant 39 year old female with a history of a PFO, and asthma, here to establish cardiac care. She complains of shortness of breath, she will have an echocardiogram to assess her LV function and the PFO. Recommend starting Aspirin 81 mg daily. Heart rate and blood pressure is well controlled today    Plan   -call with any questions   -start Aspirin 81 mg daily   -echocardiogram   -will call to review the results   -follow up  in one year     I appreciate the opportunity to participate in the patient's care, please call with any questions         Elisabeth Morgan, APRN-CNP

## 2025-03-26 ENCOUNTER — TREATMENT (OUTPATIENT)
Dept: PHYSICAL THERAPY | Facility: CLINIC | Age: 40
End: 2025-03-26
Payer: COMMERCIAL

## 2025-03-26 DIAGNOSIS — M25.852 FEMOROACETABULAR IMPINGEMENT OF LEFT HIP: ICD-10-CM

## 2025-03-26 PROCEDURE — 97112 NEUROMUSCULAR REEDUCATION: CPT | Mod: GP

## 2025-03-26 PROCEDURE — 97110 THERAPEUTIC EXERCISES: CPT | Mod: GP

## 2025-03-26 NOTE — PROGRESS NOTES
"    Patient Name: Christine Bustos  MRN: [id number]  Today's Date: 3/26/2025  Time Calculation  Start Time: 1730  Stop Time: 1815  Time Calculation (min): 45 min  PT Therapeutic Procedures Time Entry  Neuromuscular Re-Education Time Entry: 30  Therapeutic Exercise Time Entry: 10        Visit: 20/MN (67 used in 2024)    Diagnosis:   1. Femoroacetabular impingement of left hip  Follow Up In Physical Therapy            PRECAUTIONS:  Left hip arthroscopy 3/26/24    SUBJECTIVE:  Patient continues to feel like her L quad is not firing properly. Pt also has been having numbness to the touch when scratching her thigh     HEP performed? Y  Pain: 0/10 anterior left hip   Pain at worst: 3/10 anterior hip.     OBJECTIVE:  3/10/25:   Circumferential:  11\" below ASIS R = L  2.5\" above patella R 39cm L 38.5 cm       2/24/25: HHD MMT R L L % deficit R  Hip flex Supine 90/90 38 lbs. 14 lbs. 63% deficit  Hip Add supine 24 lbs. 13 lbs. 46% deficit  Hip ABD supine 25 lbs.  9.5 lbs. 62% deficit  Hip EXT prone belt 27 lbs. 9 lbs. 66% deficit  With HHD on mid calf  Hip Outcome Score ADL: 42/68    3/27/24: M Trigger testing   58% deficit of L quad     TREATMENT:  Bike x 5 min  Seated LAQ 2 x 10 (10#)    -NMES  M trigger deficit testing x 10 mins   Georgian stim LAQ (10#) , 10 on / 50 off, 30-44 intensity x 15 mins     - Manual Therapy:          ASSESSMENT:  Pt tolerated session well. A neuro deficit test was performed today to see if pt's L quad was firing as well as her R and showed a 58% deficit. She tolerated russian stim well with LAQ and a visible contraction was seen with every rep. Pt continues to progress towards goals established in the POC and should continue with skilled therapy.        Continue to progress glut strengthening ex that fires gluts well.   Establish a complete HEP with eccentric work and working on meuromuscular re-ed.   Check ABD and flexion AROM and PROM. Pt lacking active hip ABD and flex in standing today.     Goals " assessed 2/24/25  The patient will report an increase in HOS score to 54/68 Demonstrating a clinically significant change. In 8 weeks.  The patient will report a decrease in pain at best to 0/10 and at worst to 1/10 to demonstrate improvement in quality of life in 8 weeks.   Pt will demonstrate strength deficits with HHD <15% in 8 weeks to improve functional tolerance to standing, walking, squatting, and stair climbing.

## 2025-03-31 ENCOUNTER — TREATMENT (OUTPATIENT)
Dept: PHYSICAL THERAPY | Facility: CLINIC | Age: 40
End: 2025-03-31
Payer: COMMERCIAL

## 2025-03-31 DIAGNOSIS — M25.852 FEMOROACETABULAR IMPINGEMENT OF LEFT HIP: ICD-10-CM

## 2025-03-31 PROCEDURE — 97112 NEUROMUSCULAR REEDUCATION: CPT | Mod: GP

## 2025-03-31 PROCEDURE — 97140 MANUAL THERAPY 1/> REGIONS: CPT | Mod: GP

## 2025-04-09 ENCOUNTER — TREATMENT (OUTPATIENT)
Dept: PHYSICAL THERAPY | Facility: CLINIC | Age: 40
End: 2025-04-09
Payer: COMMERCIAL

## 2025-04-09 DIAGNOSIS — M25.852 FEMOROACETABULAR IMPINGEMENT OF LEFT HIP: ICD-10-CM

## 2025-04-09 PROCEDURE — 97032 APPL MODALITY 1+ESTIM EA 15: CPT | Mod: GP

## 2025-04-09 PROCEDURE — 97112 NEUROMUSCULAR REEDUCATION: CPT | Mod: GP

## 2025-04-09 NOTE — PROGRESS NOTES
"    Patient Name: Christine Bustos  MRN: [id number]  Today's Date: 4/9/2025  Time Calculation  Start Time: 1730  Stop Time: 1815  Time Calculation (min): 45 min  PT Therapeutic Procedures Time Entry  Neuromuscular Re-Education Time Entry: 28  PT Modalities Time Entry  E-Stim (Attended) Time Entry: 10     Visit: 22/MN (67 used in 2024)    Diagnosis:   1. Femoroacetabular impingement of left hip  Follow Up In Physical Therapy            PRECAUTIONS:  Left hip arthroscopy 3/26/24    SUBJECTIVE:  Patient has seen no improvement with her thigh numbness and weakness from the interventions we have been performing in PT. She continues to work hard on her HEP.     HEP performed? Y  Pain: 0/10 anterior left hip   Pain at worst: 3/10 anterior hip.     OBJECTIVE:  3/10/25:   Circumferential:  11\" below ASIS R = L  2.5\" above patella R 39cm L 38.5 cm       2/24/25: HHD MMT R L L % deficit R  Hip flex Supine 90/90 38 lbs. 14 lbs. 63% deficit  Hip Add supine 24 lbs. 13 lbs. 46% deficit  Hip ABD supine 25 lbs.  9.5 lbs. 62% deficit  Hip EXT prone belt 27 lbs. 9 lbs. 66% deficit  With HHD on mid calf  Hip Outcome Score ADL: 42/68    3/27/24: M Trigger testing   58% deficit of L quad     TREATMENT:  Bike x 5 min      -NMES  M trigger testing LAQ with 10# ankle weight (42%, 72%, 70% deficit found after each set)  M trigger testing LAQ without weight (56%, 62%, 68% deficit found after each set)  M trigger testing LAQ with isometric manual resistance (40%, 26%, 29% deficit found after each set)       - Manual Therapy:      Attended estim  FDN with estim to proximal quad x 10 mins         ASSESSMENT:  Pt tolerated session well. FDN with estim was performed to try and get some nerve innervation. She felt the stim through the needles but did not get any increase in sensation. She was able to continue to try neuromuscular deficit testing and had less of a deficit when constant resistance was applied to her LE. Pt has continued to be on a " gracie and was educated that it may benefit her to reach back out to her surgeon.         Continue to progress glut strengthening ex that fires gluts well.   Establish a complete HEP with eccentric work and working on meuromuscular re-ed.   Check ABD and flexion AROM and PROM. Pt lacking active hip ABD and flex in standing today.     Goals assessed 2/24/25  The patient will report an increase in HOS score to 54/68 Demonstrating a clinically significant change. In 8 weeks.  The patient will report a decrease in pain at best to 0/10 and at worst to 1/10 to demonstrate improvement in quality of life in 8 weeks.   Pt will demonstrate strength deficits with HHD <15% in 8 weeks to improve functional tolerance to standing, walking, squatting, and stair climbing.

## 2025-04-10 ENCOUNTER — TELEPHONE (OUTPATIENT)
Dept: ORTHOPEDIC SURGERY | Facility: HOSPITAL | Age: 40
End: 2025-04-10
Payer: COMMERCIAL

## 2025-04-10 NOTE — TELEPHONE ENCOUNTER
Called patient per the request of Shamar Acuna to get her scheduled for a follow up with Anthony. She is having persistent L hip pain, s/p Hip scope in March of 2024.  Spoke with patient and she was successfully scheduled for 4/16/25 at 8:30 am at [facility]. I did let her  know that we would likely need new x-rays at her visit. She understood. CT

## 2025-04-14 ENCOUNTER — TREATMENT (OUTPATIENT)
Dept: PHYSICAL THERAPY | Facility: CLINIC | Age: 40
End: 2025-04-14
Payer: COMMERCIAL

## 2025-04-14 DIAGNOSIS — M25.852 FEMOROACETABULAR IMPINGEMENT OF LEFT HIP: ICD-10-CM

## 2025-04-14 PROCEDURE — 97110 THERAPEUTIC EXERCISES: CPT | Mod: GP | Performed by: PHYSICAL THERAPIST

## 2025-04-14 PROCEDURE — 97140 MANUAL THERAPY 1/> REGIONS: CPT | Mod: GP | Performed by: PHYSICAL THERAPIST

## 2025-04-14 NOTE — PROGRESS NOTES
"    Patient Name: Christine Bustos  MRN: [id number]  Today's Date: 4/14/2025  Time Calculation  Start Time: 0400  Stop Time: 0445  Time Calculation (min): 45 min  PT Therapeutic Procedures Time Entry  Manual Therapy Time Entry: 20  Therapeutic Exercise Time Entry: 20        Visit: 23/MN (67 used in 2024)    Diagnosis:   1. Femoroacetabular impingement of left hip  Follow Up In Physical Therapy            PRECAUTIONS:  Left hip arthroscopy 3/26/24    SUBJECTIVE:  Home program routine:  M,W,F leg days: leg extensions with eccentrics heavy 3 sets of up to 15. Leg curls eccentrics. Leg press machine negatives. [place]ican split squats. Normal squat with anterior weight. Lunges dumbbells. Stair climbing.   Post workout she states no DOMS on the left but yes on the R.  She works core 3-4 days per week.     HEP performed? Y  Pain: 0/10 anterior left hip   Pain at worst: 0/10 anterior hip.   Reports numbness and decreased sensation to light tough in LFCN distribution    OBJECTIVE:  4/14/25  DTR S1-S2 and L3-L4 B normal 2  MMT L knee extension   4-/5 R 5/5  3/10/25:   Circumferential:  11\" below ASIS R = L  2.5\" above patella R 39cm L 38.5 cm       2/24/25: HHD MMT R L L % deficit R  Hip flex Supine 90/90 38 lbs. 14 lbs. 63% deficit  Hip Add supine 24 lbs. 13 lbs. 46% deficit  Hip ABD supine 25 lbs.  9.5 lbs. 62% deficit  Hip EXT prone belt 27 lbs. 9 lbs. 66% deficit  With HHD on mid calf  Hip Outcome Score ADL: 42/68    3/27/24: M Trigger testing   58% deficit of L quad     TREATMENT:  Bike x 5 min  Patient education and review of home program. We discussed reps and exercise dosing. We discussed sleepy quad and lack of muscle firing and need to load heavy.   CON/ECC manual over pressure 3 sets B to fatigue knee extension  -NMES      - Manual Therapy:  STM right quad, hip flexor, add, abd group    Attended estim      ASSESSMENT:  Pt education and discussion on plan of care and follow up with physician. Her DTR are normal and she " demonstrates symptoms similar to a sleepy quad and glut. Pt will follow up with physician and continue with home program heavy leg day 2x per week and a quad extension exercise 3 days per week. Manual therapy utilized to decrease muscle soreness and to improve tolerance to home program - pt to perform home program independently p session.       Plan: Possible biodex for a baseline next visit based on physician results.     Goals assessed 2/24/25  The patient will report an increase in HOS score to 54/68 Demonstrating a clinically significant change. In 8 weeks.  The patient will report a decrease in pain at best to 0/10 and at worst to 1/10 to demonstrate improvement in quality of life in 8 weeks.   Pt will demonstrate strength deficits with HHD <15% in 8 weeks to improve functional tolerance to standing, walking, squatting, and stair climbing.

## 2025-04-16 ENCOUNTER — HOSPITAL ENCOUNTER (OUTPATIENT)
Dept: RADIOLOGY | Facility: HOSPITAL | Age: 40
Discharge: HOME | End: 2025-04-16
Payer: COMMERCIAL

## 2025-04-16 ENCOUNTER — APPOINTMENT (OUTPATIENT)
Dept: PHYSICAL THERAPY | Facility: CLINIC | Age: 40
End: 2025-04-16
Payer: COMMERCIAL

## 2025-04-16 ENCOUNTER — OFFICE VISIT (OUTPATIENT)
Dept: ORTHOPEDIC SURGERY | Facility: HOSPITAL | Age: 40
End: 2025-04-16
Payer: COMMERCIAL

## 2025-04-16 DIAGNOSIS — M54.16 LUMBAR RADICULOPATHY: ICD-10-CM

## 2025-04-16 DIAGNOSIS — M25.552 LEFT HIP PAIN: ICD-10-CM

## 2025-04-16 DIAGNOSIS — M54.16 LUMBAR RADICULOPATHY: Primary | ICD-10-CM

## 2025-04-16 PROCEDURE — 99213 OFFICE O/P EST LOW 20 MIN: CPT | Performed by: ORTHOPAEDIC SURGERY

## 2025-04-16 PROCEDURE — 73502 X-RAY EXAM HIP UNI 2-3 VIEWS: CPT | Mod: LT

## 2025-04-16 PROCEDURE — 73502 X-RAY EXAM HIP UNI 2-3 VIEWS: CPT | Mod: LEFT SIDE | Performed by: RADIOLOGY

## 2025-04-16 PROCEDURE — 72100 X-RAY EXAM L-S SPINE 2/3 VWS: CPT

## 2025-04-16 PROCEDURE — 1036F TOBACCO NON-USER: CPT | Performed by: ORTHOPAEDIC SURGERY

## 2025-04-16 NOTE — PROGRESS NOTES
Patient returns to see me today she is status post hip arthroscopy she also likely has excessive femoral anteversion and mild borderline dysplastic morphology at the time of surgery she was noted to have chondral wear at the posterior inferior aspect of the femoral head.  She has done reasonably well though she is struggled with some external rotation tightness and has likely a lateral femoral cutaneous nerve numbness.  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  I do think that it would be reasonable at this point if she has been doing excessive physical therapy utilizing anti-inflammatories and modifying her activities for her to get an MRI of her low back as she could have some radicular symptoms that may be limiting her ability to progress in rehabilitation.  We will plan to see her back after the MRI scan is performed

## 2025-04-21 ENCOUNTER — TREATMENT (OUTPATIENT)
Dept: PHYSICAL THERAPY | Facility: CLINIC | Age: 40
End: 2025-04-21
Payer: COMMERCIAL

## 2025-04-21 DIAGNOSIS — M25.852 FEMOROACETABULAR IMPINGEMENT OF LEFT HIP: ICD-10-CM

## 2025-04-21 PROCEDURE — 97110 THERAPEUTIC EXERCISES: CPT | Mod: GP

## 2025-04-22 NOTE — PROGRESS NOTES
"    Patient Name: Christine Bustos  MRN: [id number]  Today's Date: 4/21/2025  Time Calculation  Start Time: 1615  Stop Time: 1700  Time Calculation (min): 45 min  PT Therapeutic Procedures Time Entry  Therapeutic Exercise Time Entry: 41        Visit: 24/MN (67 used in 2024)    Diagnosis:   1. Femoroacetabular impingement of left hip  Follow Up In Physical Therapy            PRECAUTIONS:  Left hip arthroscopy 3/26/24    SUBJECTIVE:  Pt had a follow up with Dr. Cruz and he would like to proceed with an xray and MRI for her low back to see if this is the cause of her numbness and weakness in her L LE. Pt had her xray that came back negative but she is still hopeful that her MRI will show something more. Dr. Cruz would also like to give her another hip injection to see if it will help improve her hip ROM.     HEP performed? Y  Pain: 0/10 anterior left hip   Pain at worst: 0/10 anterior hip.   Reports numbness and decreased sensation to light tough in LFCN distribution    OBJECTIVE:  4/14/25  DTR S1-S2 and L3-L4 B normal 2  MMT L knee extension   4-/5 R 5/5  3/10/25:   Circumferential:  11\" below ASIS R = L  2.5\" above patella R 39cm L 38.5 cm       2/24/25: HHD MMT R L L % deficit R  Hip flex Supine 90/90 38 lbs. 14 lbs. 63% deficit  Hip Add supine 24 lbs. 13 lbs. 46% deficit  Hip ABD supine 25 lbs.  9.5 lbs. 62% deficit  Hip EXT prone belt 27 lbs. 9 lbs. 66% deficit  With HHD on mid calf  Hip Outcome Score ADL: 42/68    3/27/24: M Trigger testing   58% deficit of L quad     4/21/24: Isokinetic testing (extension focus)   60 deg/sec- R: 96.4 lbs peak force L: 63.7lbs peak force (33.9% def)   180 deg/sec- R: 64.4 lbs peak force L: 41.5lbs peak force (35.6% def)   300 deg/sec- R: 49.7 lbs peak force L: 45.6 lbs peak force (8.4% def)     The rest of pt's isokinetic test can be found in the media section of pt's chart.     TREATMENT:  Bike x 5 min  Seated LAQ 3 x 10 (24#)   Seated hamstring curl 3 x 10 (24#)   Isokinetic " testing on Biodex   -NMES      - Manual Therapy:      Attended estim      ASSESSMENT:  Pt tolerated session well. Pt was put through an isokinetic test today to see how big her knee extension deficit is. The peak force that she was able to make with her L LE was 63.7 lbs at 60 deg/sec but the average for this deg/sec was 36.2. She was able to show good initial power but then would fatigue quickly. She verbalized that she felt fatigue in her R LE after this test but still did not feel much with her L LE. We will pause from PT until she has her MRI and then retest at a later date to see if these numbers change.       Plan: Possible biodex for a baseline next visit based on physician results.     Goals assessed 2/24/25  The patient will report an increase in HOS score to 54/68 Demonstrating a clinically significant change. In 8 weeks.  The patient will report a decrease in pain at best to 0/10 and at worst to 1/10 to demonstrate improvement in quality of life in 8 weeks.   Pt will demonstrate strength deficits with HHD <15% in 8 weeks to improve functional tolerance to standing, walking, squatting, and stair climbing.

## 2025-04-23 ENCOUNTER — APPOINTMENT (OUTPATIENT)
Dept: PHYSICAL THERAPY | Facility: CLINIC | Age: 40
End: 2025-04-23
Payer: COMMERCIAL

## 2025-04-25 ENCOUNTER — HOSPITAL ENCOUNTER (OUTPATIENT)
Dept: CARDIOLOGY | Facility: HOSPITAL | Age: 40
Discharge: HOME | End: 2025-04-25
Payer: COMMERCIAL

## 2025-04-25 DIAGNOSIS — Q21.12 PFO (PATENT FORAMEN OVALE) (HHS-HCC): ICD-10-CM

## 2025-04-25 LAB
AORTIC VALVE MEAN GRADIENT: 4 MMHG
AORTIC VALVE PEAK VELOCITY: 1.3 M/S
AV PEAK GRADIENT: 7 MMHG
AVA (PEAK VEL): 1.94 CM2
AVA (VTI): 2.05 CM2
EJECTION FRACTION APICAL 4 CHAMBER: 53.7
EJECTION FRACTION: 58 %
LEFT ATRIUM VOLUME AREA LENGTH INDEX BSA: 12.8 ML/M2
LEFT VENTRICLE INTERNAL DIMENSION DIASTOLE: 4.58 CM (ref 3.5–6)
LEFT VENTRICULAR OUTFLOW TRACT DIAMETER: 1.78 CM
MITRAL VALVE E/A RATIO: 0.88
RIGHT VENTRICLE FREE WALL PEAK S': 12 CM/S
RIGHT VENTRICLE PEAK SYSTOLIC PRESSURE: 23 MMHG
TRICUSPID ANNULAR PLANE SYSTOLIC EXCURSION: 1.8 CM

## 2025-04-25 PROCEDURE — 93306 TTE W/DOPPLER COMPLETE: CPT | Performed by: INTERNAL MEDICINE

## 2025-04-25 PROCEDURE — 93306 TTE W/DOPPLER COMPLETE: CPT

## 2025-04-28 ENCOUNTER — APPOINTMENT (OUTPATIENT)
Dept: PHYSICAL THERAPY | Facility: CLINIC | Age: 40
End: 2025-04-28
Payer: COMMERCIAL

## 2025-04-30 ENCOUNTER — APPOINTMENT (OUTPATIENT)
Dept: PHYSICAL THERAPY | Facility: CLINIC | Age: 40
End: 2025-04-30
Payer: COMMERCIAL

## 2025-05-01 ENCOUNTER — HOSPITAL ENCOUNTER (OUTPATIENT)
Dept: RADIOLOGY | Facility: CLINIC | Age: 40
Discharge: HOME | End: 2025-05-01
Payer: COMMERCIAL

## 2025-05-01 DIAGNOSIS — M54.16 LUMBAR RADICULOPATHY: ICD-10-CM

## 2025-05-01 PROCEDURE — 72148 MRI LUMBAR SPINE W/O DYE: CPT

## 2025-05-01 PROCEDURE — 72148 MRI LUMBAR SPINE W/O DYE: CPT | Performed by: RADIOLOGY

## 2025-05-05 ENCOUNTER — APPOINTMENT (OUTPATIENT)
Dept: ORTHOPEDIC SURGERY | Facility: HOSPITAL | Age: 40
End: 2025-05-05
Payer: COMMERCIAL

## 2025-05-05 ENCOUNTER — APPOINTMENT (OUTPATIENT)
Dept: PHYSICAL THERAPY | Facility: CLINIC | Age: 40
End: 2025-05-05
Payer: COMMERCIAL

## 2025-05-07 ENCOUNTER — OFFICE VISIT (OUTPATIENT)
Dept: ORTHOPEDIC SURGERY | Facility: HOSPITAL | Age: 40
End: 2025-05-07
Payer: COMMERCIAL

## 2025-05-07 ENCOUNTER — APPOINTMENT (OUTPATIENT)
Dept: ORTHOPEDIC SURGERY | Facility: HOSPITAL | Age: 40
End: 2025-05-07
Payer: COMMERCIAL

## 2025-05-07 ENCOUNTER — HOSPITAL ENCOUNTER (OUTPATIENT)
Dept: RADIOLOGY | Facility: EXTERNAL LOCATION | Age: 40
Discharge: HOME | End: 2025-05-07

## 2025-05-07 DIAGNOSIS — M54.16 LUMBAR RADICULOPATHY: Primary | ICD-10-CM

## 2025-05-07 DIAGNOSIS — M25.552 LEFT HIP PAIN: ICD-10-CM

## 2025-05-07 DIAGNOSIS — S73.192D TEAR OF LEFT ACETABULAR LABRUM, SUBSEQUENT ENCOUNTER: Primary | ICD-10-CM

## 2025-05-07 PROCEDURE — 20611 DRAIN/INJ JOINT/BURSA W/US: CPT | Mod: LT | Performed by: PHYSICIAN ASSISTANT

## 2025-05-07 PROCEDURE — 99213 OFFICE O/P EST LOW 20 MIN: CPT | Performed by: ORTHOPAEDIC SURGERY

## 2025-05-07 PROCEDURE — 99213 OFFICE O/P EST LOW 20 MIN: CPT | Mod: 25 | Performed by: ORTHOPAEDIC SURGERY

## 2025-05-07 PROCEDURE — 2500000004 HC RX 250 GENERAL PHARMACY W/ HCPCS (ALT 636 FOR OP/ED): Performed by: PHYSICIAN ASSISTANT

## 2025-05-07 RX ORDER — LIDOCAINE HYDROCHLORIDE 10 MG/ML
4 INJECTION, SOLUTION INFILTRATION; PERINEURAL
Status: COMPLETED | OUTPATIENT
Start: 2025-05-07 | End: 2025-05-07

## 2025-05-07 RX ORDER — METHYLPREDNISOLONE ACETATE 40 MG/ML
40 INJECTION, SUSPENSION INTRA-ARTICULAR; INTRALESIONAL; INTRAMUSCULAR; SOFT TISSUE
Status: COMPLETED | OUTPATIENT
Start: 2025-05-07 | End: 2025-05-07

## 2025-05-07 RX ADMIN — LIDOCAINE HYDROCHLORIDE 4 ML: 10 INJECTION, SOLUTION INFILTRATION; PERINEURAL at 12:22

## 2025-05-07 RX ADMIN — METHYLPREDNISOLONE ACETATE 40 MG: 40 INJECTION, SUSPENSION INTRA-ARTICULAR; INTRALESIONAL; INTRAMUSCULAR; INTRASYNOVIAL; SOFT TISSUE at 12:22

## 2025-05-07 NOTE — PROGRESS NOTES
Patient returns to see me today she does have a little bit of some facet hypertrophy more to the left than the right as well 4 5 area and with her symptoms I do think this could be consistent with also had some decreased rotation of her hip that she is going to get an injection today to try and improve the range of motion there and then organ to have her see Dr. Gautam to see if there is anything that could be done for her back if there is nothing new we done for her lumbar area we would plan to potentially see her back in May to get an EMG nerve conduction study.

## 2025-05-07 NOTE — PROGRESS NOTES
Patient is here today to see Dr. Cruz regarding left hip pain.  She has some soreness and a lot of tightness.  He had requested an intra-articular injection and we proceeded as below.        Patient ID: Christine Bustos is a 39 y.o. female.    L Inj/Asp: L hip joint on 5/7/2025 12:22 PM  Indications: pain  Details: 20 G needle, ultrasound-guided anterior approach  Medications: 40 mg methylPREDNISolone acetate 40 mg/mL; 4 mL lidocaine 10 mg/mL (1 %)    After consent was obtained, the anterior left hip was prepped in a sterile fashion. Ultrasound guidance was used to help insure proper needle placement into the hip joint, decrease patient discomfort, and decrease collateral damage. The joint was aspirated and Depo 40 mg with lidocaine 4cc were injected without any complications. Ultrasound images were saved on an internal file for later referene. The patient tolerated the procedure well and the area was cleaned and bandaged.    Procedure, treatment alternatives, risks and benefits explained, specific risks discussed. Consent was given by the patient. Immediately prior to procedure a time out was called to verify the correct patient, procedure, equipment, support staff and site/side marked as required. Patient was prepped and draped in the usual sterile fashion.       Janel Polk PA-C

## 2025-05-09 ENCOUNTER — APPOINTMENT (OUTPATIENT)
Dept: ORTHOPEDIC SURGERY | Facility: HOSPITAL | Age: 40
End: 2025-05-09
Payer: COMMERCIAL

## 2025-05-12 ENCOUNTER — TREATMENT (OUTPATIENT)
Dept: PHYSICAL THERAPY | Facility: CLINIC | Age: 40
End: 2025-05-12
Payer: COMMERCIAL

## 2025-05-12 ENCOUNTER — APPOINTMENT (OUTPATIENT)
Dept: PHYSICAL THERAPY | Facility: CLINIC | Age: 40
End: 2025-05-12
Payer: COMMERCIAL

## 2025-05-12 DIAGNOSIS — M25.852 FEMOROACETABULAR IMPINGEMENT OF LEFT HIP: ICD-10-CM

## 2025-05-12 PROCEDURE — 97140 MANUAL THERAPY 1/> REGIONS: CPT | Mod: GP

## 2025-05-12 PROCEDURE — 97012 MECHANICAL TRACTION THERAPY: CPT | Mod: GP

## 2025-05-12 NOTE — PROGRESS NOTES
"    Patient Name: Christine Bustos  MRN: [id number]  Today's Date: 5/12/2025  Time Calculation  Start Time: 0745  Stop Time: 0830  Time Calculation (min): 45 min  PT Therapeutic Procedures Time Entry  Manual Therapy Time Entry: 25  PT Modalities Time Entry  Mechanical Traction Time Entry: 15     Visit: 25/MN (67 used in 2024)    Diagnosis:   1. Femoroacetabular impingement of left hip  Follow Up In Physical Therapy            PRECAUTIONS:  Left hip arthroscopy 3/26/24    SUBJECTIVE:  Pt returns after having an injection in her L hip and an MRI of her low back. Her MRI revealed that there is some foraminal narrowing in her L4-5 with minor disc bulging. Dr. Cruz is having her see Dr. Gautam to see if she would be a candidate for a low back injection. She is hoping this is not the case and she will be able to decrease her numbness with continued PT. Dr. Cruz is also open to an EMG if she is not a candidate for an injection     HEP performed? Y  Pain: 0/10 anterior left hip   Pain at worst: 0/10 anterior hip.   Reports numbness and decreased sensation to light tough in LFCN distribution    OBJECTIVE:  4/14/25  DTR S1-S2 and L3-L4 B normal 2  MMT L knee extension   4-/5 R 5/5  3/10/25:   Circumferential:  11\" below ASIS R = L  2.5\" above patella R 39cm L 38.5 cm       2/24/25: HHD MMT R L L % deficit R  Hip flex Supine 90/90 38 lbs. 14 lbs. 63% deficit  Hip Add supine 24 lbs. 13 lbs. 46% deficit  Hip ABD supine 25 lbs.  9.5 lbs. 62% deficit  Hip EXT prone belt 27 lbs. 9 lbs. 66% deficit  With HHD on mid calf  Hip Outcome Score ADL: 42/68    3/27/24: M Trigger testing   58% deficit of L quad     4/21/24: Isokinetic testing (extension focus)   60 deg/sec- R: 96.4 lbs peak force L: 63.7lbs peak force (33.9% def)   180 deg/sec- R: 64.4 lbs peak force L: 41.5lbs peak force (35.6% def)   300 deg/sec- R: 49.7 lbs peak force L: 45.6 lbs peak force (8.4% def)     The rest of pt's isokinetic test can be found in the media " section of pt's chart.     TREATMENT:  Bike x 5 min    -NMES      - Manual Therapy:  PROM hip flexion, IR and ER (within precautions)   Belted lateral hip mobs   AP mobs   PA mobs     -Mechanical traction x 15 mins max 85 min 60 , 1 step       Attended estim      ASSESSMENT:  Pt tolerated session well. Pt was able to continue to work on hip ROM with belted hip mobs. She is still feeling stiff in end range ER and IR but was given exercises to perform at home in order to continue to work on increasing ROM. Pt did well with mechanical traction and did not have any increased discomfort after this was performed. She did not have any change in numbness at the end of today's session but was educated that we should continue to utilize traction to see if changes can be made       Plan: Possible biodex for a baseline next visit based on physician results.     Goals assessed 2/24/25  The patient will report an increase in HOS score to 54/68 Demonstrating a clinically significant change. In 8 weeks.  The patient will report a decrease in pain at best to 0/10 and at worst to 1/10 to demonstrate improvement in quality of life in 8 weeks.   Pt will demonstrate strength deficits with HHD <15% in 8 weeks to improve functional tolerance to standing, walking, squatting, and stair climbing.

## 2025-05-13 NOTE — PROGRESS NOTES
"    Patient Name: Christine Bustos  MRN: [id number]  Today's Date: 5/14/2025  Time Calculation  Start Time: 0745  Stop Time: 0830  Time Calculation (min): 45 min  PT Therapeutic Procedures Time Entry  Manual Therapy Time Entry: 30  PT Modalities Time Entry  Mechanical Traction Time Entry: 15     Visit: 26/MN (67 used in 2024)    Diagnosis:   1. Left hip pain        2. Femoroacetabular impingement of left hip  Follow Up In Physical Therapy            PRECAUTIONS:  Left hip arthroscopy 3/26/24    SUBJECTIVE:  Pt reports that she didn't feel anything with the traction.  No changes in leg     HEP performed? Y  Pain: 0/10 anterior left hip   Pain at worst: 0/10 anterior hip.   Reports numbness and decreased sensation to light tough in LFCN distribution    OBJECTIVE:  4/14/25  DTR S1-S2 and L3-L4 B normal 2  MMT L knee extension   4-/5 R 5/5  3/10/25:   Circumferential:  11\" below ASIS R = L  2.5\" above patella R 39cm L 38.5 cm       2/24/25: HHD MMT R L L % deficit R  Hip flex Supine 90/90 38 lbs. 14 lbs. 63% deficit  Hip Add supine 24 lbs. 13 lbs. 46% deficit  Hip ABD supine 25 lbs.  9.5 lbs. 62% deficit  Hip EXT prone belt 27 lbs. 9 lbs. 66% deficit  With HHD on mid calf  Hip Outcome Score ADL: 42/68    3/27/24: M Trigger testing   58% deficit of L quad     4/21/24: Isokinetic testing (extension focus)   60 deg/sec- R: 96.4 lbs peak force L: 63.7lbs peak force (33.9% def)   180 deg/sec- R: 64.4 lbs peak force L: 41.5lbs peak force (35.6% def)   300 deg/sec- R: 49.7 lbs peak force L: 45.6 lbs peak force (8.4% def)     The rest of pt's isokinetic test can be found in the media section of pt's chart.     TREATMENT:  Bike x 5 min    -NMES      - Manual Therapy:  PROM hip flexion, IR and ER (within precautions)   Belted lateral hip mobs   AP mobs   PA mobs     -Mechanical traction x 15 mins max 80 min 60 , 1 step       Attended estim      ASSESSMENT:  Pt tolerated session well. Pt was able to continue to work on hip ROM with " belted hip mobs. She is still feeling stiff in end range ER and IR  Pt did well with mechanical traction and did not have any increased discomfort after this was performed.  Was able to get harness a little tighter today. She did not have any change in numbness at the end of today's session but was educated that we should continue to utilize traction to see if changes can be made       Plan: Possible biodex for a baseline next visit based on physician results.     Goals assessed 2/24/25  The patient will report an increase in HOS score to 54/68 Demonstrating a clinically significant change. In 8 weeks.  The patient will report a decrease in pain at best to 0/10 and at worst to 1/10 to demonstrate improvement in quality of life in 8 weeks.   Pt will demonstrate strength deficits with HHD <15% in 8 weeks to improve functional tolerance to standing, walking, squatting, and stair climbing.

## 2025-05-14 ENCOUNTER — TREATMENT (OUTPATIENT)
Dept: PHYSICAL THERAPY | Facility: CLINIC | Age: 40
End: 2025-05-14
Payer: COMMERCIAL

## 2025-05-14 DIAGNOSIS — M25.852 FEMOROACETABULAR IMPINGEMENT OF LEFT HIP: ICD-10-CM

## 2025-05-14 DIAGNOSIS — M25.552 LEFT HIP PAIN: Primary | ICD-10-CM

## 2025-05-14 PROCEDURE — 97140 MANUAL THERAPY 1/> REGIONS: CPT | Mod: GP,CQ

## 2025-05-14 PROCEDURE — 97012 MECHANICAL TRACTION THERAPY: CPT | Mod: GP,CQ

## 2025-05-19 ENCOUNTER — TREATMENT (OUTPATIENT)
Dept: PHYSICAL THERAPY | Facility: CLINIC | Age: 40
End: 2025-05-19
Payer: COMMERCIAL

## 2025-05-19 DIAGNOSIS — M25.852 FEMOROACETABULAR IMPINGEMENT OF LEFT HIP: ICD-10-CM

## 2025-05-19 PROCEDURE — 97140 MANUAL THERAPY 1/> REGIONS: CPT | Mod: GP

## 2025-05-19 PROCEDURE — 97110 THERAPEUTIC EXERCISES: CPT | Mod: GP

## 2025-05-19 PROCEDURE — 97012 MECHANICAL TRACTION THERAPY: CPT | Mod: GP

## 2025-05-19 NOTE — PROGRESS NOTES
"    Patient Name: Christine Bustos  MRN: [id number]  Today's Date: 5/19/2025  Time Calculation  Start Time: 0915  Stop Time: 1000  Time Calculation (min): 45 min  PT Therapeutic Procedures Time Entry  Manual Therapy Time Entry: 20  Therapeutic Exercise Time Entry: 10  PT Modalities Time Entry  Mechanical Traction Time Entry: 15     Visit: 27/MN (67 used in 2024)    Diagnosis:   1. Femoroacetabular impingement of left hip  Follow Up In Physical Therapy            PRECAUTIONS:  Left hip arthroscopy 3/26/24    SUBJECTIVE:  Pt reports that she has still been having numbness in her leg. She will be meeting with Dr. Gautam tomorrow to get a second opinion on her low back MRI    HEP performed? Y  Pain: 0/10 anterior left hip   Pain at worst: 0/10 anterior hip.   Reports numbness and decreased sensation to light tough in LFCN distribution    OBJECTIVE:  4/14/25  DTR S1-S2 and L3-L4 B normal 2  MMT L knee extension   4-/5 R 5/5  3/10/25:   Circumferential:  11\" below ASIS R = L  2.5\" above patella R 39cm L 38.5 cm       2/24/25: HHD MMT R L L % deficit R  Hip flex Supine 90/90 38 lbs. 14 lbs. 63% deficit  Hip Add supine 24 lbs. 13 lbs. 46% deficit  Hip ABD supine 25 lbs.  9.5 lbs. 62% deficit  Hip EXT prone belt 27 lbs. 9 lbs. 66% deficit  With HHD on mid calf  Hip Outcome Score ADL: 42/68    3/27/24: M Trigger testing   58% deficit of L quad     4/21/24: Isokinetic testing (extension focus)   60 deg/sec- R: 96.4 lbs peak force L: 63.7lbs peak force (33.9% def)   180 deg/sec- R: 64.4 lbs peak force L: 41.5lbs peak force (35.6% def)   300 deg/sec- R: 49.7 lbs peak force L: 45.6 lbs peak force (8.4% def)     The rest of pt's isokinetic test can be found in the media section of pt's chart.     TREATMENT:  Bike x 5 min  Brandon testing for hip flexor tightness (pos on L side)   Supine hip flexor stretch off of the table 2 x 30 sec   -NMES      - Manual Therapy:  PROM hip flexion, IR and ER (within precautions)   Belted lateral hip " mobs   AP mobs   PA mobs     -Mechanical traction x 15 mins max 80 min 60 , 1 step       Attended estim      ASSESSMENT:  Pt tolerated session well. Pt was able to continue to work on hip ROM with belted hip mobs. He hip ROM appeared to be better when going into ER and IR. She was able to continue to increase tension used for mechanical traction without any increase in discomfort. Pt continues to progress towards goals established in the POC and should continue with skilled therapy.        Plan: Possible biodex for a baseline next visit based on physician results.     Goals assessed 2/24/25  The patient will report an increase in HOS score to 54/68 Demonstrating a clinically significant change. In 8 weeks.  The patient will report a decrease in pain at best to 0/10 and at worst to 1/10 to demonstrate improvement in quality of life in 8 weeks.   Pt will demonstrate strength deficits with HHD <15% in 8 weeks to improve functional tolerance to standing, walking, squatting, and stair climbing.

## 2025-05-20 ENCOUNTER — OFFICE VISIT (OUTPATIENT)
Dept: PAIN MEDICINE | Facility: HOSPITAL | Age: 40
End: 2025-05-20
Payer: COMMERCIAL

## 2025-05-20 DIAGNOSIS — M54.16 LUMBAR RADICULOPATHY: ICD-10-CM

## 2025-05-20 PROCEDURE — 99204 OFFICE O/P NEW MOD 45 MIN: CPT | Performed by: ANESTHESIOLOGY

## 2025-05-20 PROCEDURE — 99214 OFFICE O/P EST MOD 30 MIN: CPT | Performed by: ANESTHESIOLOGY

## 2025-05-20 RX ORDER — GABAPENTIN 300 MG/1
CAPSULE ORAL
Qty: 90 CAPSULE | Refills: 2 | Status: SHIPPED | OUTPATIENT
Start: 2025-05-20 | End: 2025-05-23

## 2025-05-20 ASSESSMENT — PAIN SCALES - GENERAL: PAINLEVEL_OUTOF10: 6

## 2025-05-20 NOTE — PROGRESS NOTES
Subjective   Patient ID: Christine Bustos is a 39 y.o. female with a past medical history of PFO, vit d deficiency, asthma, endometriosis, femoroacetabular impingement of L hip presenting as referral from orthopedic surgeon Dr. Cruz for evaluation of L hip pain and numbness.     The patient had a left labrum surgically repaired with Dr. Cruz in 2024. Post operative course has been complicated by decreased function, weakness, numbness and limited ROM in LLE. Symptoms of concern discussed during this visit include:   - Pain with palpation in L glute region  - Numbness in L anterior-lateral thigh  - Decreased senation and weakness with leg exercises   - Limited tolerance with seated position d/t pain and stiffness     Patient did clarify that she does not have any back pain. L hip stiffness and numbness are the primary concerns today with associated symptoms listed above.     The patient attends physical therapy two times per week and has done so for 6 weeks or more. She undergoes try needling and has used Voltaren cream previously with  no relief in symtpoms. She does not take any medications for pain or stiffness at this time. She expressed preference for minimally invasive options if possible, but she is open to more invasive procedures if necessary.       Review of Systems   13-point ROS done and negative except for HPI.     Current Outpatient Medications   Medication Instructions    albuterol 90 mcg/actuation inhaler 2 puffs, Every 4 hours PRN    ascorbic acid/collagen hydr (COLLAGEN SKIN RENEWAL ORAL) Take by mouth.    fluticasone (Flonase) 50 mcg/actuation nasal spray 2 sprays, Each Nostril, Daily, Shake gently. Before first use, prime pump. After use, clean tip and replace cap.    Pulmicort Flexhaler 180 mcg/actuation inhaler 1 puff, 2 times daily       Medical History[1]     Surgical History[2]     Family History[3]     RX Allergies[4]     Objective     There were no vitals filed for this visit.     Physical  Exam  General: NAD, well groomed, well nourished  Eyes: Non-icteric sclera, EOMI  Ears, Nose, Mouth, and Throat: External ears and nose appear to be without deformity or rash. No lesions or masses noted. Hearing is grossly intact.   Neck: Supple, trachea midline, no appreciable lumps or lymph nodes  Respiratory: Nonlabored breathing   Cardiovascular: No peripheral edema observed  Skin: No rashes or open lesions/ulcers identified on skin.  Psychiatric: Alert, orientation to person, place, and time. Cooperative.    Neurologic:   Cranial nerves grossly intact.   Strength: decrased motor strength in LLE on extension, flexion, internal & external rotation; 5/5 strength in RLE and b/l Ues   Sensation: Normal to light touch throughout; pinprick intact throughout RLE and b/l UE; decreased in anterior lateral LLE    Back:   Palpation: No tenderness to palpation over lumbar paraspinous muscles.     Imaging personally reviewed and independently interpreted:   MR lumbar spine wo IV contrast 05/01/2025    Narrative  Interpreted By:  Jose Miguel Hamm,  STUDY:  MR LUMBAR SPINE WO IV CONTRAST;  5/1/2025 9:02 am    INDICATION:  Signs/Symptoms:lumbar radiculopathy. ,M54.16 Radiculopathy, lumbar  region    COMPARISON:  None.    ACCESSION NUMBER(S):  [id number]    ORDERING CLINICIAN:  TAMICA CANCINO    TECHNIQUE:  The lumbar spine was studied in the sagital, axial and coronal planes  utiliing T1 and T2 weighted images.    FINDINGS:  There is a 1.5 cm intraosseous hemangioma within the L2 vertebral  body the marrow signal and vertebral body height are otherwise  normal. The conus and sacrum are normal. Images at each interspace  reveal the following: T12/L1  There is normal alignment and vertebral body height. The disc space  is normal. There is no evidence of canal or foraminal narrowing.  There is no evidence of bulging or herniated disc. L1/L2  There is normal alignment and vertebral body height. The disc space  is normal. There  is no evidence of canal or foraminal narrowing.  There is no evidence of bulging or herniated disc. L2/L3  There is normal alignment and vertebral body height. The disc space  is normal. There is no evidence of canal or foraminal narrowing.  There is no evidence of bulging or herniated disc. L3/L4  There is normal alignment and vertebral body height. The disc space  is normal. There is no evidence of canal or foraminal narrowing.  There is no evidence of bulging or herniated disc. L4/L5  Bilateral facet hypertrophy and mild bulging intervertebral disc  without canal stenosis. Mild bilateral foraminal narrowing without  focal disc herniation L5/S1  Bilateral facet hypertrophy without canal or foraminal narrowing    Impression  * Trace lumbar spondylosis at L4/L5 without focal disc herniation    MACRO:  none    Signed by: Jose Miguel Hamm 5/1/2025 9:42 AM  Dictation workstation:   [id number]     No image results found.     1. Lumbar radiculopathy  Referral to Pain Management           Assessment/Plan   39 y.o. female with a past medical history of PFO, vit d deficiency, asthma, endometriosis, femoroacetabular impingement of L hip presenting presenting with L hip stiffness, leg numbness and LLE weakness. Etiology possibly related to spinal canal stenosis evidenced on previous imaging though cannot rule out peripheral nerve injury. Recommended proceeding with EMG to guide future management options and starting medical management with gabapentin in the interim.     Discussed with the patient that she has pretty mild findings on lumbar MRI and I am not sure that all of her current symptoms could be explained by her imaging.  All of her symptoms would also not be explained by lateral femoral cutaneous neuralgia.  She definitely has a mixed picture.  Would rule out peripheral etiologies which is best done with an EMG.  If that was normal, may consider a lumbar injection.  While the patient has no high-grade changes her AP  diameter in her spinal canal is certainly narrowed and she has an underlying component of congenital stenosis.    Pain is certainly neuropathic.    Plan:  - EMG of the left lower extremity.  Advised the patient to reach out when she has it completed and I can take a look.  - Start gabapentin, titrate up to 600mg 3 times daily.  Written instructions given to the patient explained.  If we stop the medication later, would wean it down slowly rather than stop it abruptly.  Common side effects reviewed.  Gabapentin should not be stopped suddenly.      Follow up: As needed  & after receiving EMG results     The patient was invited to contact us back anytime with any questions or concerns and follow-up with us in the office as needed.     Diagnoses and all orders for this visit:  Lumbar radiculopathy  -     Referral to Pain Management    Carmen Sauceda MD  Interventional Pain Service  [facility]          [1]   Past Medical History:  Diagnosis Date    Acute renal failure     as a child    Anemia I had when I was a teenager and after my ruptured ectopic pregnancy but have been fine since    I was anemic when I was in my teens but have been fine since. I also was put on iron supplements after my ruptured ectopic pregnancy in 2015 for all the blood loss but have been fine since.    Asthma     exercise or infection induced    Delayed emergence from general anesthesia     The last time I was put under anesthesia was in 2017 or 2018 and they always tell me to not go back to sleep and constantly come over to me because I try going back to sleep    Endometriosis     Fissure, anal 2006    PFO (patent foramen ovale) ([facility])     last checked 2023 checked- ok   [2]   Past Surgical History:  Procedure Laterality Date    ADENOIDECTOMY      HIP SURGERY  I had hip impingement and a torn labrum so they had to shave my bone to round it and repair my labrum. This was done arthroscopic    OTHER SURGICAL HISTORY  01/20/2016     wisdom teeth    OTHER SURGICAL HISTORY  01/20/2016    Laparoscopic Excision Of Ectopic Preg & bilateral tubes    OTHER SURGICAL HISTORY  11/03/2017    Laparos Large Intest Laser Vaporization Endometriotic Tissue    OTHER SURGICAL HISTORY      excision  of endometrial tissue    OTHER SURGICAL HISTORY      right axilla lymph node biopsy - neg    TONSILLECTOMY      TUBAL LIGATION     [3]   Family History  Problem Relation Name Age of Onset    Heart disease Mother Radha     Cancer Paternal Grandfather Mike Bustos     Stroke Paternal Grandmother Jazmin Juli     Alcohol abuse Brother Robert Bustos    [4]   Allergies  Allergen Reactions    Ciprofloxacin Other     IV only when with flagyl skin was burning and red, oral ok    Flagyl [Metronidazole] Other     With cipro with IV burning skin hot and red, oral ok  Pt states had oral in Dec 2024 and developed hives, itching    Motrin [Ibuprofen] Other     As a  child had acute renal failure

## 2025-05-23 DIAGNOSIS — M25.552 LEFT HIP PAIN: Primary | ICD-10-CM

## 2025-05-23 DIAGNOSIS — M25.852 FEMOROACETABULAR IMPINGEMENT OF LEFT HIP: ICD-10-CM

## 2025-05-27 ENCOUNTER — TELEPHONE (OUTPATIENT)
Dept: OBSTETRICS AND GYNECOLOGY | Facility: CLINIC | Age: 40
End: 2025-05-27
Payer: COMMERCIAL

## 2025-05-28 NOTE — PROGRESS NOTES
"    Patient Name: Christine Bustos  MRN: [id number]  Today's Date: 5/29/2025  Time Calculation  Start Time: 0745  Stop Time: 0830  Time Calculation (min): 45 min  PT Therapeutic Procedures Time Entry  Manual Therapy Time Entry: 25  PT Modalities Time Entry  Mechanical Traction Time Entry: 15     Visit: 28/MN (67 used in 2024)    Diagnosis:   1. Left hip pain        2. Femoroacetabular impingement of left hip  Follow Up In Physical Therapy            PRECAUTIONS:  Left hip arthroscopy 3/26/24    SUBJECTIVE:  Pt reports that she has the EMG scheduled next week.    HEP performed? Y  Pain: 0/10 anterior left hip   Pain at worst: 0/10 anterior hip.   Reports numbness and decreased sensation to light tough in LFCN distribution    OBJECTIVE:  4/14/25  DTR S1-S2 and L3-L4 B normal 2  MMT L knee extension   4-/5 R 5/5  3/10/25:   Circumferential:  11\" below ASIS R = L  2.5\" above patella R 39cm L 38.5 cm       2/24/25: HHD MMT R L L % deficit R  Hip flex Supine 90/90 38 lbs. 14 lbs. 63% deficit  Hip Add supine 24 lbs. 13 lbs. 46% deficit  Hip ABD supine 25 lbs.  9.5 lbs. 62% deficit  Hip EXT prone belt 27 lbs. 9 lbs. 66% deficit  With HHD on mid calf  Hip Outcome Score ADL: 42/68    3/27/24: M Trigger testing   58% deficit of L quad     4/21/24: Isokinetic testing (extension focus)   60 deg/sec- R: 96.4 lbs peak force L: 63.7lbs peak force (33.9% def)   180 deg/sec- R: 64.4 lbs peak force L: 41.5lbs peak force (35.6% def)   300 deg/sec- R: 49.7 lbs peak force L: 45.6 lbs peak force (8.4% def)     The rest of pt's isokinetic test can be found in the media section of pt's chart.     TREATMENT:  Bike x 5 min  PROM hip flexion   Supine hip flexor stretch off of the table 2 x 30 sec         - Manual Therapy:  STM quad, hip flexors   Manual hip flexor stretch   Belted hip mobs IR, ER, distraction   AP mobs   PA mobs     -Mechanical traction x 15 mins max 80 min 60 , 1 step       Attended estim      ASSESSMENT:  Pt tolerated session " well. Pt was able to continue to work on hip ROM with belted hip mobs. Her hip ROM appeared to be better when going into ER and IR.  Pt continues to progress towards goals established in the POC and should continue with skilled therapy.        Plan:   Cont with mobility and traction  Goals assessed 2/24/25  The patient will report an increase in HOS score to 54/68 Demonstrating a clinically significant change. In 8 weeks.  The patient will report a decrease in pain at best to 0/10 and at worst to 1/10 to demonstrate improvement in quality of life in 8 weeks.   Pt will demonstrate strength deficits with HHD <15% in 8 weeks to improve functional tolerance to standing, walking, squatting, and stair climbing.

## 2025-05-29 ENCOUNTER — TREATMENT (OUTPATIENT)
Dept: PHYSICAL THERAPY | Facility: CLINIC | Age: 40
End: 2025-05-29
Payer: COMMERCIAL

## 2025-05-29 DIAGNOSIS — M25.552 LEFT HIP PAIN: Primary | ICD-10-CM

## 2025-05-29 DIAGNOSIS — M25.852 FEMOROACETABULAR IMPINGEMENT OF LEFT HIP: ICD-10-CM

## 2025-05-29 PROCEDURE — 97140 MANUAL THERAPY 1/> REGIONS: CPT | Mod: GP,CQ

## 2025-05-29 PROCEDURE — 97012 MECHANICAL TRACTION THERAPY: CPT | Mod: GP,CQ

## 2025-06-02 ENCOUNTER — TREATMENT (OUTPATIENT)
Dept: PHYSICAL THERAPY | Facility: CLINIC | Age: 40
End: 2025-06-02
Payer: COMMERCIAL

## 2025-06-02 DIAGNOSIS — M25.852 FEMOROACETABULAR IMPINGEMENT OF LEFT HIP: ICD-10-CM

## 2025-06-02 PROCEDURE — 97110 THERAPEUTIC EXERCISES: CPT | Mod: GP

## 2025-06-02 PROCEDURE — 97140 MANUAL THERAPY 1/> REGIONS: CPT | Mod: GP

## 2025-06-02 PROCEDURE — 97012 MECHANICAL TRACTION THERAPY: CPT | Mod: GP

## 2025-06-02 NOTE — PROGRESS NOTES
"    Patient Name: Christine Bustos  MRN: [id number]  Today's Date: 6/2/2025  Time Calculation  Start Time: 0830  Stop Time: 0915  Time Calculation (min): 45 min  PT Therapeutic Procedures Time Entry  Manual Therapy Time Entry: 15  Therapeutic Exercise Time Entry: 10  PT Modalities Time Entry  Mechanical Traction Time Entry: 15     Visit: 29/MN (67 used in 2024)    Diagnosis:   1. Femoroacetabular impingement of left hip  Follow Up In Physical Therapy            PRECAUTIONS:  Left hip arthroscopy 3/26/24    SUBJECTIVE:  Pt has continued to have numbness in her L LE. She will be having an EMG this week to see where a nerve may be entrapped.     HEP performed? Y  Pain: 0/10 anterior left hip   Pain at worst: 0/10 anterior hip.   Reports numbness and decreased sensation to light tough in LFCN distribution    OBJECTIVE:  4/14/25  DTR S1-S2 and L3-L4 B normal 2  MMT L knee extension   4-/5 R 5/5  3/10/25:   Circumferential:  11\" below ASIS R = L  2.5\" above patella R 39cm L 38.5 cm       2/24/25: HHD MMT R L L % deficit R  Hip flex Supine 90/90 38 lbs. 14 lbs. 63% deficit  Hip Add supine 24 lbs. 13 lbs. 46% deficit  Hip ABD supine 25 lbs.  9.5 lbs. 62% deficit  Hip EXT prone belt 27 lbs. 9 lbs. 66% deficit  With HHD on mid calf  Hip Outcome Score ADL: 42/68    3/27/24: M Trigger testing   58% deficit of L quad     4/21/24: Isokinetic testing (extension focus)   60 deg/sec- R: 96.4 lbs peak force L: 63.7lbs peak force (33.9% def)   180 deg/sec- R: 64.4 lbs peak force L: 41.5lbs peak force (35.6% def)   300 deg/sec- R: 49.7 lbs peak force L: 45.6 lbs peak force (8.4% def)     The rest of pt's isokinetic test can be found in the media section of pt's chart.     TREATMENT:  Bike x 5 min  PROM hip flexion           - Manual Therapy:  Manual hip flexor stretch   Belted hip mobs IR, ER, distraction       -Mechanical traction x 15 mins max 80 min 60 , 1 step       Attended estim      ASSESSMENT:  Pt tolerated session well. Pt " continued to get minimal relief from mechanical traction. She was educated that we may change the course of her treatment plan after we get her EMG results. Pt continues to progress towards goals established in the POC and should continue with skilled therapy.          Plan:   Cont with mobility and traction  Goals assessed 2/24/25  The patient will report an increase in HOS score to 54/68 Demonstrating a clinically significant change. In 8 weeks.  The patient will report a decrease in pain at best to 0/10 and at worst to 1/10 to demonstrate improvement in quality of life in 8 weeks.   Pt will demonstrate strength deficits with HHD <15% in 8 weeks to improve functional tolerance to standing, walking, squatting, and stair climbing.

## 2025-06-04 ENCOUNTER — TREATMENT (OUTPATIENT)
Dept: PHYSICAL THERAPY | Facility: CLINIC | Age: 40
End: 2025-06-04
Payer: COMMERCIAL

## 2025-06-04 DIAGNOSIS — M25.852 FEMOROACETABULAR IMPINGEMENT OF LEFT HIP: ICD-10-CM

## 2025-06-04 PROCEDURE — 97110 THERAPEUTIC EXERCISES: CPT | Mod: GP

## 2025-06-04 PROCEDURE — 97012 MECHANICAL TRACTION THERAPY: CPT | Mod: GP

## 2025-06-04 NOTE — PROGRESS NOTES
"    Patient Name: Christine Bustos  MRN: [id number]  Today's Date: 6/4/2025  Time Calculation  Start Time: 0800  Stop Time: 0845  Time Calculation (min): 45 min  PT Therapeutic Procedures Time Entry  Therapeutic Exercise Time Entry: 25  PT Modalities Time Entry  Mechanical Traction Time Entry: 15     Visit: 29/MN (67 used in 2024)    Diagnosis:   1. Femoroacetabular impingement of left hip  Follow Up In Physical Therapy            PRECAUTIONS:  Left hip arthroscopy 3/26/24    SUBJECTIVE:  Pt is still having numbness but feels like her strength continues to improve at the gym     HEP performed? Y  Pain: 0/10 anterior left hip   Pain at worst: 0/10 anterior hip.   Reports numbness and decreased sensation to light tough in LFCN distribution    OBJECTIVE:  4/14/25  DTR S1-S2 and L3-L4 B normal 2  MMT L knee extension   4-/5 R 5/5  3/10/25:   Circumferential:  11\" below ASIS R = L  2.5\" above patella R 39cm L 38.5 cm       2/24/25: HHD MMT R L L % deficit R  Hip flex Supine 90/90 38 lbs. 14 lbs. 63% deficit  Hip Add supine 24 lbs. 13 lbs. 46% deficit  Hip ABD supine 25 lbs.  9.5 lbs. 62% deficit  Hip EXT prone belt 27 lbs. 9 lbs. 66% deficit  With HHD on mid calf  Hip Outcome Score ADL: 42/68    3/27/24: M Trigger testing   58% deficit of L quad     4/21/24: Isokinetic testing (extension focus)   60 deg/sec- R: 96.4 lbs peak force L: 63.7lbs peak force (33.9% def)   180 deg/sec- R: 64.4 lbs peak force L: 41.5lbs peak force (35.6% def)   300 deg/sec- R: 49.7 lbs peak force L: 45.6 lbs peak force (8.4% def)     The rest of pt's isokinetic test can be found in the media section of pt's chart.     TREATMENT:  Bike x 5 min  PROM hip flexion   SL leg press (65#) 2 x 5   SL hamstring curl (12#) 2 x 10   SL knee extension (16#) 2 x 10       - Manual Therapy:        -Mechanical traction x 15 mins max 80 min 60 , 1 step       Attended estim      ASSESSMENT:  Pt tolerated session well. Pt continued to tolerate mechanical traction well " without any increase in symptoms. Pt was put through SL strengthening exercises and showed that she has had some good improvements. Pt continues to progress towards goals established in the POC and should continue with skilled therapy.            Plan:   Cont with mobility and traction  Goals assessed 2/24/25  The patient will report an increase in HOS score to 54/68 Demonstrating a clinically significant change. In 8 weeks.  The patient will report a decrease in pain at best to 0/10 and at worst to 1/10 to demonstrate improvement in quality of life in 8 weeks.   Pt will demonstrate strength deficits with HHD <15% in 8 weeks to improve functional tolerance to standing, walking, squatting, and stair climbing.

## 2025-06-05 ENCOUNTER — HOSPITAL ENCOUNTER (OUTPATIENT)
Dept: NEUROLOGY | Facility: CLINIC | Age: 40
Discharge: HOME | End: 2025-06-05
Payer: COMMERCIAL

## 2025-06-05 DIAGNOSIS — M25.852 FEMOROACETABULAR IMPINGEMENT OF LEFT HIP: ICD-10-CM

## 2025-06-05 DIAGNOSIS — M25.552 LEFT HIP PAIN: ICD-10-CM

## 2025-06-05 PROCEDURE — 95909 NRV CNDJ TST 5-6 STUDIES: CPT | Performed by: PSYCHIATRY & NEUROLOGY

## 2025-06-05 PROCEDURE — 95886 MUSC TEST DONE W/N TEST COMP: CPT | Performed by: PSYCHIATRY & NEUROLOGY

## 2025-06-05 PROCEDURE — 95909 NRV CNDJ TST 5-6 STUDIES: CPT | Mod: GC | Performed by: PSYCHIATRY & NEUROLOGY

## 2025-06-05 PROCEDURE — 95886 MUSC TEST DONE W/N TEST COMP: CPT | Mod: GC | Performed by: PSYCHIATRY & NEUROLOGY

## 2025-06-09 ENCOUNTER — TREATMENT (OUTPATIENT)
Dept: PHYSICAL THERAPY | Facility: CLINIC | Age: 40
End: 2025-06-09
Payer: COMMERCIAL

## 2025-06-09 DIAGNOSIS — M25.852 FEMOROACETABULAR IMPINGEMENT OF LEFT HIP: ICD-10-CM

## 2025-06-09 PROCEDURE — 97140 MANUAL THERAPY 1/> REGIONS: CPT | Mod: GP

## 2025-06-09 PROCEDURE — 97110 THERAPEUTIC EXERCISES: CPT | Mod: GP

## 2025-06-09 NOTE — PROGRESS NOTES
"    Patient Name: Christine Bustos  MRN: [id number]  Today's Date: 6/9/2025  Time Calculation  Start Time: 1530  Stop Time: 1615  Time Calculation (min): 45 min  PT Therapeutic Procedures Time Entry  Manual Therapy Time Entry: 32  Therapeutic Exercise Time Entry: 9        Visit: 30/MN (67 used in 2024)    Diagnosis:   1. Femoroacetabular impingement of left hip  Follow Up In Physical Therapy              PRECAUTIONS:  Left hip arthroscopy 3/26/24    SUBJECTIVE:  Pt had an EMG last week and her results ended up being negative. She has continued to keep up with her lower body gym routine 2x per week     HEP performed? Y  Pain: 0/10 anterior left hip   Pain at worst: 0/10 anterior hip.   Reports numbness and decreased sensation to light tough in LFCN distribution    OBJECTIVE:  4/14/25  DTR S1-S2 and L3-L4 B normal 2  MMT L knee extension   4-/5 R 5/5  3/10/25:   Circumferential:  11\" below ASIS R = L  2.5\" above patella R 39cm L 38.5 cm       2/24/25: HHD MMT R L L % deficit R  Hip flex Supine 90/90 38 lbs. 14 lbs. 63% deficit  Hip Add supine 24 lbs. 13 lbs. 46% deficit  Hip ABD supine 25 lbs.  9.5 lbs. 62% deficit  Hip EXT prone belt 27 lbs. 9 lbs. 66% deficit  With HHD on mid calf  Hip Outcome Score ADL: 42/68    3/27/24: M Trigger testing   58% deficit of L quad     4/21/24: Isokinetic testing (extension focus)   60 deg/sec- R: 96.4 lbs peak force L: 63.7lbs peak force (33.9% def)   180 deg/sec- R: 64.4 lbs peak force L: 41.5lbs peak force (35.6% def)   300 deg/sec- R: 49.7 lbs peak force L: 45.6 lbs peak force (8.4% def)     The rest of pt's isokinetic test can be found in the media section of pt's chart.     TREATMENT:  Bike x 5 min  PROM hip flexion       - Manual Therapy:  STM to lateral glute   Belted lateral hip mobs with movement     Attended estim  FDN to lateral glute region with .[id number] needles           ASSESSMENT:  Pt tolerated session well. Due to increased tenderness and tightness of lateral glute, " today's session focused mainly on manual therapy. Pt verbalized a decrease in tenderness of her lateral glute after this was performed. Pt was educated that we will have her continue with her strength program away from the clinic after next visit and have her come back once per month for re testing. Pt continues to progress towards goals established in the POC and should continue with skilled therapy.              Plan:   Cont with mobility and traction  Goals assessed 2/24/25  The patient will report an increase in HOS score to 54/68 Demonstrating a clinically significant change. In 8 weeks.  The patient will report a decrease in pain at best to 0/10 and at worst to 1/10 to demonstrate improvement in quality of life in 8 weeks.   Pt will demonstrate strength deficits with HHD <15% in 8 weeks to improve functional tolerance to standing, walking, squatting, and stair climbing.

## 2025-06-11 ENCOUNTER — TREATMENT (OUTPATIENT)
Dept: PHYSICAL THERAPY | Facility: CLINIC | Age: 40
End: 2025-06-11
Payer: COMMERCIAL

## 2025-06-11 DIAGNOSIS — M25.852 FEMOROACETABULAR IMPINGEMENT OF LEFT HIP: ICD-10-CM

## 2025-06-11 PROCEDURE — 97110 THERAPEUTIC EXERCISES: CPT | Mod: GP

## 2025-06-11 NOTE — PROGRESS NOTES
"    Patient Name: Christine Bustos  MRN: [id number]  Today's Date: 6/11/2025  Time Calculation  Start Time: 1530  Stop Time: 1600  Time Calculation (min): 30 min  PT Therapeutic Procedures Time Entry  Therapeutic Exercise Time Entry: 29        Visit: 31/MN (67 used in 2024)    Diagnosis:   1. Femoroacetabular impingement of left hip  Follow Up In Physical Therapy                PRECAUTIONS:  Left hip arthroscopy 3/26/24    SUBJECTIVE:  Pt has continued to keep up with her HEP. She is open to having another Biodex today to see if she has made any progress in the last two months with her quad and hamstring strength    HEP performed? Y  Pain: 0/10 anterior left hip   Pain at worst: 0/10 anterior hip.   Reports numbness and decreased sensation to light tough in LFCN distribution    OBJECTIVE:  4/14/25  DTR S1-S2 and L3-L4 B normal 2  MMT L knee extension   4-/5 R 5/5  3/10/25:   Circumferential:  11\" below ASIS R = L  2.5\" above patella R 39cm L 38.5 cm       2/24/25: HHD MMT R L L % deficit R  Hip flex Supine 90/90 38 lbs. 14 lbs. 63% deficit  Hip Add supine 24 lbs. 13 lbs. 46% deficit  Hip ABD supine 25 lbs.  9.5 lbs. 62% deficit  Hip EXT prone belt 27 lbs. 9 lbs. 66% deficit  With HHD on mid calf  Hip Outcome Score ADL: 42/68    3/27/24: M Trigger testing   58% deficit of L quad     4/21/25: Isokinetic testing (extension focus)   60 deg/sec- R: 96.4 lbs peak force L: 63.7lbs peak force (33.9% def)   180 deg/sec- R: 64.4 lbs peak force L: 41.5lbs peak force (35.6% def)   300 deg/sec- R: 49.7 lbs peak force L: 45.6 lbs peak force (8.4% def)     6/12/25: Isokinetic testing (extension focus)   60 deg/sec- R: 99.2 lbs peak force L: 76.1lbs peak force (23% def)   180 deg/sec- R: 69.1 lbs peak force L:39.3 lbs peak force (43.2% def)   300 deg/sec- R: 54.9 lbs peak force L: 33.1 lbs peak force (39.8% def)     The rest of pt's isokinetic test can be found in the media section of pt's chart.     TREATMENT:  Bike x 5 min  Seated " knee extension 2 x 10 (48#)   Seated hamstring curl 2 x 10 (36#)   Biodex testing (results in OBJ)       - Manual Therapy:    Attended estim            ASSESSMENT:  Pt tolerated session well. Pt showed an increase in knee extension strength at 60 deg/sec via Biodex testing. She did not have as good of numbers at 180 and 300 deg/sec as her first test but this may have been due to fatigue. Pt was educated that we will continue out this month of PT to try a different approach for her numbness. Pt continues to progress towards goals established in the POC and should continue with skilled therapy.              Plan:   Cont with mobility and traction  Goals assessed 2/24/25  The patient will report an increase in HOS score to 54/68 Demonstrating a clinically significant change. In 8 weeks.  The patient will report a decrease in pain at best to 0/10 and at worst to 1/10 to demonstrate improvement in quality of life in 8 weeks.   Pt will demonstrate strength deficits with HHD <15% in 8 weeks to improve functional tolerance to standing, walking, squatting, and stair climbing.

## 2025-06-16 ENCOUNTER — TREATMENT (OUTPATIENT)
Dept: PHYSICAL THERAPY | Facility: CLINIC | Age: 40
End: 2025-06-16
Payer: COMMERCIAL

## 2025-06-16 DIAGNOSIS — M25.852 FEMOROACETABULAR IMPINGEMENT OF LEFT HIP: ICD-10-CM

## 2025-06-16 PROCEDURE — 97112 NEUROMUSCULAR REEDUCATION: CPT | Mod: GP | Performed by: PHYSICAL THERAPIST

## 2025-06-16 PROCEDURE — 97110 THERAPEUTIC EXERCISES: CPT | Mod: GP | Performed by: PHYSICAL THERAPIST

## 2025-06-16 NOTE — PROGRESS NOTES
"    Patient Name: Christine Bustos  MRN: [id number]  Today's Date: 6/16/2025  Time Calculation  Start Time: 1645  Stop Time: 1738  Time Calculation (min): 53 min  PT Therapeutic Procedures Time Entry  Therapeutic Exercise Time Entry: 20  Neuromuscular Re-Education Time Entry: 20        Visit: 32/MN (67 used in 2024)    Diagnosis:   1. Femoroacetabular impingement of left hip  Follow Up In Physical Therapy          PRECAUTIONS:  Left hip arthroscopy 3/26/24    SUBJECTIVE:  Pt has been compliant with home program. Loading 2x a week. 1x she does eccentric work and 1x she does notmal isotonic work.     HEP performed? Y  Pain: 0/10 anterior left hip   Pain at worst: 0/10 anterior hip.   Reports numbness and decreased sensation to light tough in LFCN distribution    OBJECTIVE:  4/14/25  DTR S1-S2 and L3-L4 B normal 2  MMT L knee extension   4-/5 R 5/5  3/10/25:   Circumferential:  11\" below ASIS R = L  2.5\" above patella R 39cm L 38.5 cm       2/24/25: HHD MMT R L L % deficit R  Hip flex Supine 90/90 38 lbs. 14 lbs. 63% deficit  Hip Add supine 24 lbs. 13 lbs. 46% deficit  Hip ABD supine 25 lbs.  9.5 lbs. 62% deficit  Hip EXT prone belt 27 lbs. 9 lbs. 66% deficit  With HHD on mid calf  Hip Outcome Score ADL: 42/68    3/27/24: M Trigger testing   58% deficit of L quad     4/21/25: Isokinetic testing (extension focus)   60 deg/sec- R: 96.4 lbs peak force L: 63.7lbs peak force (33.9% def)   180 deg/sec- R: 64.4 lbs peak force L: 41.5lbs peak force (35.6% def)   300 deg/sec- R: 49.7 lbs peak force L: 45.6 lbs peak force (8.4% def)     6/12/25: Isokinetic testing (extension focus)   60 deg/sec- R: 99.2 lbs peak force L: 76.1lbs peak force (23% def)   180 deg/sec- R: 69.1 lbs peak force L:39.3 lbs peak force (43.2% def)   300 deg/sec- R: 54.9 lbs peak force L: 33.1 lbs peak force (39.8% def)     The rest of pt's isokinetic test can be found in the media section of pt's chart.     TREATMENT:  Bike x 5 min  Seated knee extension " "eccentric R 2 sets to 8 RPE and L 2 sets to 8 RPE  Pt education on RPE for repetitions and sets during loading with home program.    NMES:  BFR + NMES Marshallese stim co-contract 10 second/20 second - BFR at 72% occlusion  6 mins BFR on and 1 min rest each exercise  LAQ 10 lbs.   SL ABD 3 lbs.  Non BFR isometric 10\" on and 20\" off manual OP      ASSESSMENT:  Pt tolerated session well. PT utilized BFR + NMES to improve neuromuscular activation of the quad and gluteal muscles. Pt continues to report that she does not feel the muscle fatiguing on her left side and with manual over pressure PT can feel significant difference between knee extensions B.       Plan:   Focus on neuromuscular activation of L LE and improving tolerance to load.  Eccentric cross exercises  Biofeedback - Mtrigger possible assessment  Vibration  External focus of attention  BFR + NMES isometric knee extension work at 60 degrees and 90 degrees  Biofeedback eccentric work    Goals assessed 2/24/25  The patient will report an increase in HOS score to 54/68 Demonstrating a clinically significant change. In 8 weeks.  The patient will report a decrease in pain at best to 0/10 and at worst to 1/10 to demonstrate improvement in quality of life in 8 weeks.   Pt will demonstrate strength deficits with HHD <15% in 8 weeks to improve functional tolerance to standing, walking, squatting, and stair climbing.   "

## 2025-06-19 ENCOUNTER — TREATMENT (OUTPATIENT)
Dept: PHYSICAL THERAPY | Facility: CLINIC | Age: 40
End: 2025-06-19
Payer: COMMERCIAL

## 2025-06-19 DIAGNOSIS — M25.852 FEMOROACETABULAR IMPINGEMENT OF LEFT HIP: ICD-10-CM

## 2025-06-19 PROCEDURE — 97112 NEUROMUSCULAR REEDUCATION: CPT | Mod: GP | Performed by: PHYSICAL THERAPIST

## 2025-06-19 NOTE — PROGRESS NOTES
"    Patient Name: Christine Bustos  MRN: [id number]  Today's Date: 6/19/2025  Time Calculation  Start Time: 0930  Stop Time: 1015  Time Calculation (min): 45 min  PT Therapeutic Procedures Time Entry  Neuromuscular Re-Education Time Entry: 39        Visit: 33/MN (67 used in 2024)    Diagnosis:   1. Femoroacetabular impingement of left hip  Follow Up In Physical Therapy        PRECAUTIONS:  Left hip arthroscopy 3/26/24    SUBJECTIVE:  Pt reports some anterior hip tightness. She does not report pain or discomfort but it is located anterior superior.    HEP performed? Y  Pain: 0/10 anterior left hip   Pain at worst: 0/10 anterior hip.   Reports numbness and decreased sensation to light tough in LFCN distribution    OBJECTIVE:  4/14/25  DTR S1-S2 and L3-L4 B normal 2  MMT L knee extension   4-/5 R 5/5  3/10/25:   Circumferential:  11\" below ASIS R = L  2.5\" above patella R 39cm L 38.5 cm       2/24/25: HHD MMT R L L % deficit R  Hip flex Supine 90/90 38 lbs. 14 lbs. 63% deficit  Hip Add supine 24 lbs. 13 lbs. 46% deficit  Hip ABD supine 25 lbs.  9.5 lbs. 62% deficit  Hip EXT prone belt 27 lbs. 9 lbs. 66% deficit  With HHD on mid calf  Hip Outcome Score ADL: 42/68    3/27/24: M Trigger testing   58% deficit of L quad     4/21/25: Isokinetic testing (extension focus)   60 deg/sec- R: 96.4 lbs peak force L: 63.7lbs peak force (33.9% def)   180 deg/sec- R: 64.4 lbs peak force L: 41.5lbs peak force (35.6% def)   300 deg/sec- R: 49.7 lbs peak force L: 45.6 lbs peak force (8.4% def)     6/12/25: Isokinetic testing (extension focus)   60 deg/sec- R: 99.2 lbs peak force L: 76.1lbs peak force (23% def)   180 deg/sec- R: 69.1 lbs peak force L:39.3 lbs peak force (43.2% def)   300 deg/sec- R: 54.9 lbs peak force L: 33.1 lbs peak force (39.8% def)     The rest of pt's isokinetic test can be found in the media section of pt's chart.     TREATMENT:  Bike x 5 min  Seated knee extension eccentric R 2 sets to 8 RPE and L 2 sets to 8 RPE  Pt " "education on RPE for repetitions and sets during loading with home program.    NMES:  BFR + NMES [place]ic stim co-contract 10 second/10 second - BFR at 72% occlusion  6 mins BFR on with isometric hold  LAQ at 90 degrees   LAQ at 60 degrees  Manual eccentric loading of knee extension     Biofeedback Mtrigger Training 1200mu goal set  Eccentric knee extension machine 10\" eccentric x 3 mins 2 sets L and 1 R using MTrigger    ASSESSMENT:  Pt had no tenderness to palpate in anterior hip region. Negative scour and stinchfield test. PROM pain free in all ranges.   Pt tolerated eccentric, BFR, and biofeedback today well with cuing for proper loading and NMRE activation during eccentric work.     Plan:   Focus on neuromuscular activation of L LE and improving tolerance to load.  Eccentric cross exercises  Biofeedback - Mtrigger possible assessment  Vibration  External focus of attention  BFR + NMES isometric knee extension work at 60 degrees and 90 degrees  Biofeedback eccentric work    Goals assessed 2/24/25  The patient will report an increase in HOS score to 54/68 Demonstrating a clinically significant change. In 8 weeks.  The patient will report a decrease in pain at best to 0/10 and at worst to 1/10 to demonstrate improvement in quality of life in 8 weeks.   Pt will demonstrate strength deficits with HHD <15% in 8 weeks to improve functional tolerance to standing, walking, squatting, and stair climbing.   "

## 2025-06-23 ENCOUNTER — TREATMENT (OUTPATIENT)
Dept: PHYSICAL THERAPY | Facility: CLINIC | Age: 40
End: 2025-06-23
Payer: COMMERCIAL

## 2025-06-23 DIAGNOSIS — M25.852 FEMOROACETABULAR IMPINGEMENT OF LEFT HIP: ICD-10-CM

## 2025-06-23 PROCEDURE — 97110 THERAPEUTIC EXERCISES: CPT | Mod: GP

## 2025-06-23 PROCEDURE — 97112 NEUROMUSCULAR REEDUCATION: CPT | Mod: GP

## 2025-06-23 NOTE — PROGRESS NOTES
"    Patient Name: Christine Bustos  MRN: [id number]  Today's Date: 6/23/2025  Time Calculation  Start Time: 1045  Stop Time: 1130  Time Calculation (min): 45 min  PT Therapeutic Procedures Time Entry  Therapeutic Exercise Time Entry: 15  Neuromuscular Re-Education Time Entry: 25        Visit: 34/MN (67 used in 2024)    Diagnosis:   1. Femoroacetabular impingement of left hip  Follow Up In Physical Therapy        PRECAUTIONS:  Left hip arthroscopy 3/26/24    SUBJECTIVE:  Pt reports that she feels like the NMES and BFR could be making some changes with her numbness but she is not 100% sure.     HEP performed? Y  Pain: 0/10 anterior left hip   Pain at worst: 0/10 anterior hip.   Reports numbness and decreased sensation to light tough in LFCN distribution    OBJECTIVE:  4/14/25  DTR S1-S2 and L3-L4 B normal 2  MMT L knee extension   4-/5 R 5/5  3/10/25:   Circumferential:  11\" below ASIS R = L  2.5\" above patella R 39cm L 38.5 cm       2/24/25: HHD MMT R L L % deficit R  Hip flex Supine 90/90 38 lbs. 14 lbs. 63% deficit  Hip Add supine 24 lbs. 13 lbs. 46% deficit  Hip ABD supine 25 lbs.  9.5 lbs. 62% deficit  Hip EXT prone belt 27 lbs. 9 lbs. 66% deficit  With HHD on mid calf  Hip Outcome Score ADL: 42/68    3/27/24: M Trigger testing   58% deficit of L quad     4/21/25: Isokinetic testing (extension focus)   60 deg/sec- R: 96.4 lbs peak force L: 63.7lbs peak force (33.9% def)   180 deg/sec- R: 64.4 lbs peak force L: 41.5lbs peak force (35.6% def)   300 deg/sec- R: 49.7 lbs peak force L: 45.6 lbs peak force (8.4% def)     6/12/25: Isokinetic testing (extension focus)   60 deg/sec- R: 99.2 lbs peak force L: 76.1lbs peak force (23% def)   180 deg/sec- R: 69.1 lbs peak force L:39.3 lbs peak force (43.2% def)   300 deg/sec- R: 54.9 lbs peak force L: 33.1 lbs peak force (39.8% def)     The rest of pt's isokinetic test can be found in the media section of pt's chart.     TREATMENT:  Bike x 5 min  SL eccentric leg press (210 on R, " 140 on L) 3 x 5     NMES:  BFR + NMES Sudanese stim co-contract 10 second/10 second - BFR at 72% occlusion  6 mins BFR on with isometric hold  LAQ at 90 degrees   LAQ at 60 degrees  10# ankle weight eccentric LAQ         ASSESSMENT:  Pt tolerated session well. Pt was able to continue to progress exercises for BFR and NMES. Pt was able to perform the heaviest weight yet for SL eccentric leg press with proper control and tempo. Pt continues to progress towards goals established in the POC and should continue with skilled therapy.      Plan:   Focus on neuromuscular activation of L LE and improving tolerance to load.  Eccentric cross exercises  Biofeedback - Mtrigger possible assessment  Vibration  External focus of attention  BFR + NMES isometric knee extension work at 60 degrees and 90 degrees  Biofeedback eccentric work    Goals assessed 2/24/25  The patient will report an increase in HOS score to 54/68 Demonstrating a clinically significant change. In 8 weeks.  The patient will report a decrease in pain at best to 0/10 and at worst to 1/10 to demonstrate improvement in quality of life in 8 weeks.   Pt will demonstrate strength deficits with HHD <15% in 8 weeks to improve functional tolerance to standing, walking, squatting, and stair climbing.

## 2025-06-25 ENCOUNTER — TREATMENT (OUTPATIENT)
Dept: PHYSICAL THERAPY | Facility: CLINIC | Age: 40
End: 2025-06-25
Payer: COMMERCIAL

## 2025-06-25 DIAGNOSIS — M25.852 FEMOROACETABULAR IMPINGEMENT OF LEFT HIP: ICD-10-CM

## 2025-06-25 PROCEDURE — 97110 THERAPEUTIC EXERCISES: CPT | Mod: GP

## 2025-06-25 NOTE — PROGRESS NOTES
"    Patient Name: Christine Bustos  MRN: [id number]  Today's Date: 6/25/2025  Time Calculation  Start Time: 1115  Stop Time: 1200  Time Calculation (min): 45 min  PT Therapeutic Procedures Time Entry  Therapeutic Exercise Time Entry: 40        Visit: 34/MN (67 used in 2024)    Diagnosis:   1. Femoroacetabular impingement of left hip  Follow Up In Physical Therapy        PRECAUTIONS:  Left hip arthroscopy 3/26/24    SUBJECTIVE:  Pt reports that she can notice some subtle changes with her strength and numbness      HEP performed? Y  Pain: 0/10 anterior left hip   Pain at worst: 0/10 anterior hip.   Reports numbness and decreased sensation to light tough in LFCN distribution    OBJECTIVE:  4/14/25  DTR S1-S2 and L3-L4 B normal 2  MMT L knee extension   4-/5 R 5/5  3/10/25:   Circumferential:  11\" below ASIS R = L  2.5\" above patella R 39cm L 38.5 cm       2/24/25: HHD MMT R L L % deficit R  Hip flex Supine 90/90 38 lbs. 14 lbs. 63% deficit  Hip Add supine 24 lbs. 13 lbs. 46% deficit  Hip ABD supine 25 lbs.  9.5 lbs. 62% deficit  Hip EXT prone belt 27 lbs. 9 lbs. 66% deficit  With HHD on mid calf  Hip Outcome Score ADL: 42/68    3/27/24: M Trigger testing   58% deficit of L quad     4/21/25: Isokinetic testing (extension focus)   60 deg/sec- R: 96.4 lbs peak force L: 63.7lbs peak force (33.9% def)   180 deg/sec- R: 64.4 lbs peak force L: 41.5lbs peak force (35.6% def)   300 deg/sec- R: 49.7 lbs peak force L: 45.6 lbs peak force (8.4% def)     6/12/25: Isokinetic testing (extension focus)   60 deg/sec- R: 99.2 lbs peak force L: 76.1lbs peak force (23% def)   180 deg/sec- R: 69.1 lbs peak force L:39.3 lbs peak force (43.2% def)   300 deg/sec- R: 54.9 lbs peak force L: 33.1 lbs peak force (39.8% def)     The rest of pt's isokinetic test can be found in the media section of pt's chart.     TREATMENT:  Bike x 5 min  The following was performed with BFR set to 70% occlusion   -LAQ (10#)  - SL leg press   - Sidelying hip abduction "     TRX eccentric pistol squat 2 x 10     NMES:             ASSESSMENT:  Pt tolerated session well. Pt was able to get through a full BFR strengthening day without any increase in hip discomfort. She noted some fatigue but was able to perform all exercises. Pt continues to progress towards goals established in the POC and should continue with skilled therapy.        Plan:   Focus on neuromuscular activation of L LE and improving tolerance to load.  Eccentric cross exercises  Biofeedback - Mtrigger possible assessment  Vibration  External focus of attention  BFR + NMES isometric knee extension work at 60 degrees and 90 degrees  Biofeedback eccentric work    Goals assessed 2/24/25  The patient will report an increase in HOS score to 54/68 Demonstrating a clinically significant change. In 8 weeks.  The patient will report a decrease in pain at best to 0/10 and at worst to 1/10 to demonstrate improvement in quality of life in 8 weeks.   Pt will demonstrate strength deficits with HHD <15% in 8 weeks to improve functional tolerance to standing, walking, squatting, and stair climbing.

## 2025-06-27 ENCOUNTER — TELEPHONE (OUTPATIENT)
Dept: OBSTETRICS AND GYNECOLOGY | Facility: CLINIC | Age: 40
End: 2025-06-27
Payer: COMMERCIAL

## 2025-07-02 ENCOUNTER — TREATMENT (OUTPATIENT)
Dept: PHYSICAL THERAPY | Facility: CLINIC | Age: 40
End: 2025-07-02
Payer: COMMERCIAL

## 2025-07-02 DIAGNOSIS — M25.852 FEMOROACETABULAR IMPINGEMENT OF LEFT HIP: ICD-10-CM

## 2025-07-02 PROCEDURE — 97110 THERAPEUTIC EXERCISES: CPT | Mod: GP

## 2025-07-02 PROCEDURE — 97112 NEUROMUSCULAR REEDUCATION: CPT | Mod: GP

## 2025-07-02 NOTE — PROGRESS NOTES
"    Patient Name: Christine Bustos  MRN: [id number]  Today's Date: 7/2/2025  Time Calculation  Start Time: 0715  Stop Time: 0800  Time Calculation (min): 45 min  PT Therapeutic Procedures Time Entry  Therapeutic Exercise Time Entry: 10  Neuromuscular Re-Education Time Entry: 30        Visit: 35/MN (67 used in 2024)    Diagnosis:   1. Femoroacetabular impingement of left hip  Follow Up In Physical Therapy        PRECAUTIONS:  Left hip arthroscopy 3/26/24    SUBJECTIVE:  Pt has continued to work hard in the gym. She is not sure if her numbness is getting any better     HEP performed? Y  Pain: 0/10 anterior left hip   Pain at worst: 0/10 anterior hip.   Reports numbness and decreased sensation to light tough in LFCN distribution    OBJECTIVE:  4/14/25  DTR S1-S2 and L3-L4 B normal 2  MMT L knee extension   4-/5 R 5/5  3/10/25:   Circumferential:  11\" below ASIS R = L  2.5\" above patella R 39cm L 38.5 cm       2/24/25: HHD MMT R L L % deficit R  Hip flex Supine 90/90 38 lbs. 14 lbs. 63% deficit  Hip Add supine 24 lbs. 13 lbs. 46% deficit  Hip ABD supine 25 lbs.  9.5 lbs. 62% deficit  Hip EXT prone belt 27 lbs. 9 lbs. 66% deficit  With HHD on mid calf  Hip Outcome Score ADL: 42/68    3/27/24: M Trigger testing   58% deficit of L quad     4/21/25: Isokinetic testing (extension focus)   60 deg/sec- R: 96.4 lbs peak force L: 63.7lbs peak force (33.9% def)   180 deg/sec- R: 64.4 lbs peak force L: 41.5lbs peak force (35.6% def)   300 deg/sec- R: 49.7 lbs peak force L: 45.6 lbs peak force (8.4% def)     6/12/25: Isokinetic testing (extension focus)   60 deg/sec- R: 99.2 lbs peak force L: 76.1lbs peak force (23% def)   180 deg/sec- R: 69.1 lbs peak force L:39.3 lbs peak force (43.2% def)   300 deg/sec- R: 54.9 lbs peak force L: 33.1 lbs peak force (39.8% def)     The rest of pt's isokinetic test can be found in the media section of pt's chart.     TREATMENT:  Bike x 5 min  Split squat 2 x 10     NMES:  Russian stim with BFR 10 " on/10 off   - isometric LAQ @ 90   - isometric LAQ @ 60  - 12# LAQ            ASSESSMENT:  Pt tolerated session well. Pt was able to continue to use NMES and BFR while progressing resistance used. Pt was able to perform split squat through full ROM at the end of today's session. Pt continues to progress towards goals established in the POC and should continue with skilled therapy.        Plan:   Focus on neuromuscular activation of L LE and improving tolerance to load.  Eccentric cross exercises  Biofeedback - Mtrigger possible assessment  Vibration  External focus of attention  BFR + NMES isometric knee extension work at 60 degrees and 90 degrees  Biofeedback eccentric work    Goals assessed 2/24/25  The patient will report an increase in HOS score to 54/68 Demonstrating a clinically significant change. In 8 weeks.  The patient will report a decrease in pain at best to 0/10 and at worst to 1/10 to demonstrate improvement in quality of life in 8 weeks.   Pt will demonstrate strength deficits with HHD <15% in 8 weeks to improve functional tolerance to standing, walking, squatting, and stair climbing.

## 2025-07-07 ENCOUNTER — APPOINTMENT (OUTPATIENT)
Dept: PHYSICAL THERAPY | Facility: CLINIC | Age: 40
End: 2025-07-07
Payer: COMMERCIAL

## 2025-07-09 ENCOUNTER — APPOINTMENT (OUTPATIENT)
Dept: PHYSICAL THERAPY | Facility: CLINIC | Age: 40
End: 2025-07-09
Payer: COMMERCIAL

## 2025-07-14 ENCOUNTER — TREATMENT (OUTPATIENT)
Dept: PHYSICAL THERAPY | Facility: CLINIC | Age: 40
End: 2025-07-14
Payer: COMMERCIAL

## 2025-07-14 DIAGNOSIS — M25.852 FEMOROACETABULAR IMPINGEMENT OF LEFT HIP: ICD-10-CM

## 2025-07-14 PROCEDURE — 97110 THERAPEUTIC EXERCISES: CPT | Mod: GP

## 2025-07-14 PROCEDURE — 97112 NEUROMUSCULAR REEDUCATION: CPT | Mod: GP

## 2025-07-14 NOTE — PROGRESS NOTES
"    Patient Name: Christine Bustos  MRN: [id number]  Today's Date: 7/14/2025  Time Calculation  Start Time: 0700  Stop Time: 0745  Time Calculation (min): 45 min  PT Therapeutic Procedures Time Entry  Therapeutic Exercise Time Entry: 14  Neuromuscular Re-Education Time Entry: 25        Visit: 36/MN (67 used in 2024)    Diagnosis:   1. Femoroacetabular impingement of left hip  Follow Up In Physical Therapy        PRECAUTIONS:  Left hip arthroscopy 3/26/24    SUBJECTIVE:  Pt had to go to [employer] [place] last week and feels like her hip held up well.     HEP performed? Y  Pain: 0/10 anterior left hip   Pain at worst: 0/10 anterior hip.   Reports numbness and decreased sensation to light tough in LFCN distribution    OBJECTIVE:  4/14/25  DTR S1-S2 and L3-L4 B normal 2  MMT L knee extension   4-/5 R 5/5  3/10/25:   Circumferential:  11\" below ASIS R = L  2.5\" above patella R 39cm L 38.5 cm       2/24/25: HHD MMT R L L % deficit R  Hip flex Supine 90/90 38 lbs. 14 lbs. 63% deficit  Hip Add supine 24 lbs. 13 lbs. 46% deficit  Hip ABD supine 25 lbs.  9.5 lbs. 62% deficit  Hip EXT prone belt 27 lbs. 9 lbs. 66% deficit  With HHD on mid calf  Hip Outcome Score ADL: 42/68    3/27/24: M Trigger testing   58% deficit of L quad     4/21/25: Isokinetic testing (extension focus)   60 deg/sec- R: 96.4 lbs peak force L: 63.7lbs peak force (33.9% def)   180 deg/sec- R: 64.4 lbs peak force L: 41.5lbs peak force (35.6% def)   300 deg/sec- R: 49.7 lbs peak force L: 45.6 lbs peak force (8.4% def)     6/12/25: Isokinetic testing (extension focus)   60 deg/sec- R: 99.2 lbs peak force L: 76.1lbs peak force (23% def)   180 deg/sec- R: 69.1 lbs peak force L:39.3 lbs peak force (43.2% def)   300 deg/sec- R: 54.9 lbs peak force L: 33.1 lbs peak force (39.8% def)     The rest of pt's isokinetic test can be found in the media section of pt's chart.     TREATMENT:  Bike x 5 min  SL leg press 4 x 15 (with BFR set to 80% occlusion)     NMES:  [place] stim with " BFR 10 on/10 off   - isometric LAQ @ 90   - isometric LAQ @ 60  - 15# LAQ            ASSESSMENT:  Pt tolerated session well. Pt was able to continue to progress all resistance used for BFR and NMES exercises with good quad contraction. Pt continues to progress towards goals established in the POC and should continue with skilled therapy.          Plan:   Focus on neuromuscular activation of L LE and improving tolerance to load.  Eccentric cross exercises  Biofeedback - Mtrigger possible assessment  Vibration  External focus of attention  BFR + NMES isometric knee extension work at 60 degrees and 90 degrees  Biofeedback eccentric work    Goals assessed 2/24/25  The patient will report an increase in HOS score to 54/68 Demonstrating a clinically significant change. In 8 weeks.  The patient will report a decrease in pain at best to 0/10 and at worst to 1/10 to demonstrate improvement in quality of life in 8 weeks.   Pt will demonstrate strength deficits with HHD <15% in 8 weeks to improve functional tolerance to standing, walking, squatting, and stair climbing.

## 2025-07-16 ENCOUNTER — TREATMENT (OUTPATIENT)
Dept: PHYSICAL THERAPY | Facility: CLINIC | Age: 40
End: 2025-07-16
Payer: COMMERCIAL

## 2025-07-16 DIAGNOSIS — M25.552 LEFT HIP PAIN: Primary | ICD-10-CM

## 2025-07-16 DIAGNOSIS — M25.852 FEMOROACETABULAR IMPINGEMENT OF LEFT HIP: ICD-10-CM

## 2025-07-16 PROCEDURE — 97110 THERAPEUTIC EXERCISES: CPT | Mod: GP,CQ

## 2025-07-16 PROCEDURE — 97112 NEUROMUSCULAR REEDUCATION: CPT | Mod: GP,CQ

## 2025-07-16 NOTE — PROGRESS NOTES
"    Patient Name: Christine Bustos  MRN: [id number]  Today's Date: 7/16/2025  Time Calculation  Start Time: 0700  Stop Time: 0748  Time Calculation (min): 48 min  PT Therapeutic Procedures Time Entry  Therapeutic Exercise Time Entry: 15  Neuromuscular Re-Education Time Entry: 23        Visit: 37/MN (67 used in 2024)    Diagnosis:   1. Left hip pain        2. Femoroacetabular impingement of left hip  Follow Up In Physical Therapy        PRECAUTIONS:  Left hip arthroscopy 3/26/24    SUBJECTIVE:  Pt reports that she feels that she is getting stronger.  Being muscle tested [place] with [facility].    HEP performed? Y  Pain: 0/10 anterior left hip   Pain at worst: 0/10 anterior hip.   Reports numbness and decreased sensation to light tough in LFCN distribution    OBJECTIVE:  4/14/25  DTR S1-S2 and L3-L4 B normal 2  MMT L knee extension   4-/5 R 5/5  3/10/25:   Circumferential:  11\" below ASIS R = L  2.5\" above patella R 39cm L 38.5 cm       2/24/25: HHD MMT R L L % deficit R  Hip flex Supine 90/90 38 lbs. 14 lbs. 63% deficit  Hip Add supine 24 lbs. 13 lbs. 46% deficit  Hip ABD supine 25 lbs.  9.5 lbs. 62% deficit  Hip EXT prone belt 27 lbs. 9 lbs. 66% deficit  With HHD on mid calf  Hip Outcome Score ADL: 42/68    3/27/24: M Trigger testing   58% deficit of L quad     4/21/25: Isokinetic testing (extension focus)   60 deg/sec- R: 96.4 lbs peak force L: 63.7lbs peak force (33.9% def)   180 deg/sec- R: 64.4 lbs peak force L: 41.5lbs peak force (35.6% def)   300 deg/sec- R: 49.7 lbs peak force L: 45.6 lbs peak force (8.4% def)     6/12/25: Isokinetic testing (extension focus)   60 deg/sec- R: 99.2 lbs peak force L: 76.1lbs peak force (23% def)   180 deg/sec- R: 69.1 lbs peak force L:39.3 lbs peak force (43.2% def)   300 deg/sec- R: 54.9 lbs peak force L: 33.1 lbs peak force (39.8% def)     The rest of pt's isokinetic test can be found in the media section of pt's chart.     TREATMENT:  Bike x 5 min  6\" step up 3 x 10  (with BFR set to 80% " occlusion)     NMES:  Russian stim with BFR 10 on/10 off   - isometric LAQ @ 90 x 6 min with belt   - isometric LAQ @ 60 x 6 min with belt  - 15# LAQ x 6 min            ASSESSMENT:  Pt tolerated session well. Pt was able to continue to progress all resistance used for BFR and NMES exercises with good quad contraction. Pt continues to progress towards goals established in the POC and should continue with skilled therapy.      Plan:   Focus on neuromuscular activation of L LE and improving tolerance to load.  Eccentric cross exercises  Biofeedback - Mtrigger possible assessment  Vibration  External focus of attention  BFR + NMES isometric knee extension work at 60 degrees and 90 degrees  Biofeedback eccentric work    Goals assessed 2/24/25  The patient will report an increase in HOS score to 54/68 Demonstrating a clinically significant change. In 8 weeks.  The patient will report a decrease in pain at best to 0/10 and at worst to 1/10 to demonstrate improvement in quality of life in 8 weeks.   Pt will demonstrate strength deficits with HHD <15% in 8 weeks to improve functional tolerance to standing, walking, squatting, and stair climbing.

## 2025-07-21 ENCOUNTER — TREATMENT (OUTPATIENT)
Dept: PHYSICAL THERAPY | Facility: CLINIC | Age: 40
End: 2025-07-21
Payer: COMMERCIAL

## 2025-07-21 DIAGNOSIS — M25.852 FEMOROACETABULAR IMPINGEMENT OF LEFT HIP: ICD-10-CM

## 2025-07-21 PROCEDURE — 97112 NEUROMUSCULAR REEDUCATION: CPT | Mod: GP

## 2025-07-21 PROCEDURE — 97110 THERAPEUTIC EXERCISES: CPT | Mod: GP

## 2025-07-21 NOTE — PROGRESS NOTES
"    Patient Name: Christine Bustos  MRN: [id number]  Today's Date: 7/21/2025  Time Calculation  Start Time: 0700  Stop Time: 0745  Time Calculation (min): 45 min  PT Therapeutic Procedures Time Entry  Therapeutic Exercise Time Entry: 30  Neuromuscular Re-Education Time Entry: 10        Visit: 38/MN (67 used in 2024)    Diagnosis:   1. Femoroacetabular impingement of left hip  Follow Up In Physical Therapy        PRECAUTIONS:  Left hip arthroscopy 3/26/24    SUBJECTIVE:  Pt reports that she feels like she has been having less numbness in her L thigh.     HEP performed? Y  Pain: 0/10 anterior left hip   Pain at worst: 0/10 anterior hip.   Reports numbness and decreased sensation to light tough in LFCN distribution    OBJECTIVE:  4/14/25  DTR S1-S2 and L3-L4 B normal 2  MMT L knee extension   4-/5 R 5/5  3/10/25:   Circumferential:  11\" below ASIS R = L  2.5\" above patella R 39cm L 38.5 cm       2/24/25: HHD MMT R L L % deficit R  Hip flex Supine 90/90 38 lbs. 14 lbs. 63% deficit  Hip Add supine 24 lbs. 13 lbs. 46% deficit  Hip ABD supine 25 lbs.  9.5 lbs. 62% deficit  Hip EXT prone belt 27 lbs. 9 lbs. 66% deficit  With HHD on mid calf  Hip Outcome Score ADL: 42/68    3/27/24: M Trigger testing   58% deficit of L quad     4/21/25: Isokinetic testing (extension focus)   60 deg/sec- R: 96.4 lbs peak force L: 63.7lbs peak force (33.9% def)   180 deg/sec- R: 64.4 lbs peak force L: 41.5lbs peak force (35.6% def)   300 deg/sec- R: 49.7 lbs peak force L: 45.6 lbs peak force (8.4% def)     6/12/25: Isokinetic testing (extension focus)   60 deg/sec- R: 99.2 lbs peak force L: 76.1lbs peak force (23% def)   180 deg/sec- R: 69.1 lbs peak force L:39.3 lbs peak force (43.2% def)   300 deg/sec- R: 54.9 lbs peak force L: 33.1 lbs peak force (39.8% def)       7/21/25: Isokinetic testing (extension focus)   60 deg/sec- R: 94 lbs peak force L: 62lbs peak force (34% def)   180 deg/sec- R: 68 lbs peak force L:50 lbs peak force (26.5% def) "   300 deg/sec- R: 49 lbs peak force L: 35 lbs peak force (28% def)   The rest of pt's isokinetic test can be found in the media section of pt's chart.     TREATMENT:  Bike x 5 min  Knee extension 3 x 10 (48#)   Knee flexion 3 x 10 (48#)   Biodex testing    NMES:  Russian stim with BFR 10 on/10 off  - 16# LAQ x 10 mins           ASSESSMENT:  Pt tolerated session well. Pt was able to perform another biodex test and had good results. Pt continues to show that her quadriceps and hamstring endurance are getting better. She only had a slight drop off in strength when performing the 60 deg/sec set. She also verbalized that her numbness is starting to get better. Pt continues to progress towards goals established in the POC and should continue with skilled therapy.        Plan:   Focus on neuromuscular activation of L LE and improving tolerance to load.  Eccentric cross exercises  Biofeedback - Mtrigger possible assessment  Vibration  External focus of attention  BFR + NMES isometric knee extension work at 60 degrees and 90 degrees  Biofeedback eccentric work    Goals assessed 2/24/25  The patient will report an increase in HOS score to 54/68 Demonstrating a clinically significant change. In 8 weeks.  The patient will report a decrease in pain at best to 0/10 and at worst to 1/10 to demonstrate improvement in quality of life in 8 weeks.   Pt will demonstrate strength deficits with HHD <15% in 8 weeks to improve functional tolerance to standing, walking, squatting, and stair climbing.

## 2025-07-23 ENCOUNTER — APPOINTMENT (OUTPATIENT)
Dept: PHYSICAL THERAPY | Facility: CLINIC | Age: 40
End: 2025-07-23
Payer: COMMERCIAL

## 2025-07-28 ENCOUNTER — TREATMENT (OUTPATIENT)
Dept: PHYSICAL THERAPY | Facility: CLINIC | Age: 40
End: 2025-07-28
Payer: COMMERCIAL

## 2025-07-28 DIAGNOSIS — M25.852 FEMOROACETABULAR IMPINGEMENT OF LEFT HIP: Primary | ICD-10-CM

## 2025-07-28 PROCEDURE — 97140 MANUAL THERAPY 1/> REGIONS: CPT | Mod: GP

## 2025-07-28 PROCEDURE — 97112 NEUROMUSCULAR REEDUCATION: CPT | Mod: GP

## 2025-07-28 NOTE — PROGRESS NOTES
"    Patient Name: Christine Bustos  MRN: [id number]  Today's Date: 7/28/2025  Time Calculation  Start Time: 0700  Stop Time: 0745  Time Calculation (min): 45 min  PT Therapeutic Procedures Time Entry  Neuromuscular Re-Education Time Entry: 25  Manual Therapy Time Entry: 15        Visit: 39/MN (67 used in 2024)    Diagnosis:   1. Femoroacetabular impingement of left hip  Follow Up In Physical Therapy        PRECAUTIONS:  Left hip arthroscopy 3/26/24    SUBJECTIVE:  Pt was able to play flag football over the weekend with only some increased soreness. She did not do any sprinting but was able to run at a good speed when catching the ball.     HEP performed? Y  Pain: 0/10 anterior left hip   Pain at worst: 0/10 anterior hip.   Reports numbness and decreased sensation to light tough in LFCN distribution    OBJECTIVE:  4/14/25  DTR S1-S2 and L3-L4 B normal 2  MMT L knee extension   4-/5 R 5/5  3/10/25:   Circumferential:  11\" below ASIS R = L  2.5\" above patella R 39cm L 38.5 cm       2/24/25: HHD MMT R L L % deficit R  Hip flex Supine 90/90 38 lbs. 14 lbs. 63% deficit  Hip Add supine 24 lbs. 13 lbs. 46% deficit  Hip ABD supine 25 lbs.  9.5 lbs. 62% deficit  Hip EXT prone belt 27 lbs. 9 lbs. 66% deficit  With HHD on mid calf  Hip Outcome Score ADL: 42/68    3/27/24: M Trigger testing   58% deficit of L quad     4/21/25: Isokinetic testing (extension focus)   60 deg/sec- R: 96.4 lbs peak force L: 63.7lbs peak force (33.9% def)   180 deg/sec- R: 64.4 lbs peak force L: 41.5lbs peak force (35.6% def)   300 deg/sec- R: 49.7 lbs peak force L: 45.6 lbs peak force (8.4% def)     6/12/25: Isokinetic testing (extension focus)   60 deg/sec- R: 99.2 lbs peak force L: 76.1lbs peak force (23% def)   180 deg/sec- R: 69.1 lbs peak force L:39.3 lbs peak force (43.2% def)   300 deg/sec- R: 54.9 lbs peak force L: 33.1 lbs peak force (39.8% def)       7/21/25: Isokinetic testing (extension focus)   60 deg/sec- R: 94 lbs peak force L: 62lbs peak " force (34% def)   180 deg/sec- R: 68 lbs peak force L:50 lbs peak force (26.5% def)   300 deg/sec- R: 49 lbs peak force L: 35 lbs peak force (28% def)   The rest of pt's isokinetic test can be found in the media section of pt's chart.     TREATMENT:  Bike x 5 min      NMES:  Russian stim with BFR 10 on/10 off  -Isometric LAQ   - 16# LAQ x 10 mins    PETROS:   STM to L adductor, hip flexor, quad            ASSESSMENT:  Pt tolerated session well. Due to increased soreness, resistance was kept light but BFR and estim were still used. Pt had increased L hip musculature tightness but tolerated STM well with a decrease in tension. Pt continues to progress towards goals established in the POC and should continue with skilled therapy.          Plan:   Focus on neuromuscular activation of L LE and improving tolerance to load.  Eccentric cross exercises  Biofeedback - Mtrigger possible assessment  Vibration  External focus of attention  BFR + NMES isometric knee extension work at 60 degrees and 90 degrees  Biofeedback eccentric work    Goals assessed 2/24/25  The patient will report an increase in HOS score to 54/68 Demonstrating a clinically significant change. In 8 weeks.  The patient will report a decrease in pain at best to 0/10 and at worst to 1/10 to demonstrate improvement in quality of life in 8 weeks.   Pt will demonstrate strength deficits with HHD <15% in 8 weeks to improve functional tolerance to standing, walking, squatting, and stair climbing.

## 2025-07-30 ENCOUNTER — APPOINTMENT (OUTPATIENT)
Dept: PHYSICAL THERAPY | Facility: CLINIC | Age: 40
End: 2025-07-30
Payer: COMMERCIAL

## 2025-08-04 ENCOUNTER — TREATMENT (OUTPATIENT)
Dept: PHYSICAL THERAPY | Facility: CLINIC | Age: 40
End: 2025-08-04
Payer: COMMERCIAL

## 2025-08-04 DIAGNOSIS — M25.852 FEMOROACETABULAR IMPINGEMENT OF LEFT HIP: ICD-10-CM

## 2025-08-04 PROCEDURE — 97112 NEUROMUSCULAR REEDUCATION: CPT | Mod: GP

## 2025-08-04 PROCEDURE — 97110 THERAPEUTIC EXERCISES: CPT | Mod: GP

## 2025-08-04 NOTE — PROGRESS NOTES
"    Patient Name: Christine Bustos  MRN: [id number]  Today's Date: 8/4/2025  Time Calculation  Start Time: 0700  Stop Time: 0745  Time Calculation (min): 45 min  PT Therapeutic Procedures Time Entry  Therapeutic Exercise Time Entry: 26  Neuromuscular Re-Education Time Entry: 12        Visit: 40/MN (67 used in 2024)    Diagnosis:   1. Femoroacetabular impingement of left hip  Follow Up In Physical Therapy        PRECAUTIONS:  Left hip arthroscopy 3/26/24    SUBJECTIVE:  Pt continues to feel like her numbness is almost completely gone in her L LE.     HEP performed? Y  Pain: 0/10 anterior left hip   Pain at worst: 0/10 anterior hip.   Reports numbness and decreased sensation to light tough in LFCN distribution    OBJECTIVE:  4/14/25  DTR S1-S2 and L3-L4 B normal 2  MMT L knee extension   4-/5 R 5/5  3/10/25:   Circumferential:  11\" below ASIS R = L  2.5\" above patella R 39cm L 38.5 cm       2/24/25: HHD MMT R L L % deficit R  Hip flex Supine 90/90 38 lbs. 14 lbs. 63% deficit  Hip Add supine 24 lbs. 13 lbs. 46% deficit  Hip ABD supine 25 lbs.  9.5 lbs. 62% deficit  Hip EXT prone belt 27 lbs. 9 lbs. 66% deficit  With HHD on mid calf  Hip Outcome Score ADL: 42/68    3/27/24: M Trigger testing   58% deficit of L quad     4/21/25: Isokinetic testing (extension focus)   60 deg/sec- R: 96.4 lbs peak force L: 63.7lbs peak force (33.9% def)   180 deg/sec- R: 64.4 lbs peak force L: 41.5lbs peak force (35.6% def)   300 deg/sec- R: 49.7 lbs peak force L: 45.6 lbs peak force (8.4% def)     6/12/25: Isokinetic testing (extension focus)   60 deg/sec- R: 99.2 lbs peak force L: 76.1lbs peak force (23% def)   180 deg/sec- R: 69.1 lbs peak force L:39.3 lbs peak force (43.2% def)   300 deg/sec- R: 54.9 lbs peak force L: 33.1 lbs peak force (39.8% def)       7/21/25: Isokinetic testing (extension focus)   60 deg/sec- R: 94 lbs peak force L: 62lbs peak force (34% def)   180 deg/sec- R: 68 lbs peak force L:50 lbs peak force (26.5% def)   300 " deg/sec- R: 49 lbs peak force L: 35 lbs peak force (28% def)   The rest of pt's isokinetic test can be found in the media section of pt's chart.     TREATMENT:  Bike x 5 min  With BFR only (large cuff)   -SL shuttle press (37#)   - Banded TKE     NMES:  Russian stim with BFR 10 on/10 off  - 17# LAQ x 10 mins      PETROS:              ASSESSMENT:  Pt tolerated session well. Pt was able to continue to increase resistance used for all exercises today through full ROM. Pt was educated that we will perform one final biodex in September before discharge. Pt continues to progress towards goals established in the POC and should continue with skilled therapy.            Plan:   Focus on neuromuscular activation of L LE and improving tolerance to load.  Eccentric cross exercises  Biofeedback - Mtrigger possible assessment  Vibration  External focus of attention  BFR + NMES isometric knee extension work at 60 degrees and 90 degrees  Biofeedback eccentric work    Goals assessed 2/24/25  The patient will report an increase in HOS score to 54/68 Demonstrating a clinically significant change. In 8 weeks.  The patient will report a decrease in pain at best to 0/10 and at worst to 1/10 to demonstrate improvement in quality of life in 8 weeks.   Pt will demonstrate strength deficits with HHD <15% in 8 weeks to improve functional tolerance to standing, walking, squatting, and stair climbing.

## 2025-08-06 ENCOUNTER — APPOINTMENT (OUTPATIENT)
Dept: PHYSICAL THERAPY | Facility: CLINIC | Age: 40
End: 2025-08-06
Payer: COMMERCIAL

## 2025-08-11 ENCOUNTER — TREATMENT (OUTPATIENT)
Dept: PHYSICAL THERAPY | Facility: CLINIC | Age: 40
End: 2025-08-11
Payer: COMMERCIAL

## 2025-08-11 DIAGNOSIS — M25.852 FEMOROACETABULAR IMPINGEMENT OF LEFT HIP: ICD-10-CM

## 2025-08-11 PROCEDURE — 97110 THERAPEUTIC EXERCISES: CPT | Mod: GP

## 2025-08-11 PROCEDURE — 97112 NEUROMUSCULAR REEDUCATION: CPT | Mod: GP

## 2025-08-13 ENCOUNTER — APPOINTMENT (OUTPATIENT)
Dept: PHYSICAL THERAPY | Facility: CLINIC | Age: 40
End: 2025-08-13
Payer: COMMERCIAL

## 2025-08-18 ENCOUNTER — TREATMENT (OUTPATIENT)
Dept: PHYSICAL THERAPY | Facility: CLINIC | Age: 40
End: 2025-08-18
Payer: COMMERCIAL

## 2025-08-18 DIAGNOSIS — M25.852 FEMOROACETABULAR IMPINGEMENT OF LEFT HIP: ICD-10-CM

## 2025-08-18 DIAGNOSIS — M25.552 LEFT HIP PAIN: Primary | ICD-10-CM

## 2025-08-18 PROCEDURE — 97110 THERAPEUTIC EXERCISES: CPT | Mod: GP,CQ

## 2025-08-20 ENCOUNTER — APPOINTMENT (OUTPATIENT)
Dept: PHYSICAL THERAPY | Facility: CLINIC | Age: 40
End: 2025-08-20
Payer: COMMERCIAL

## 2025-08-22 ENCOUNTER — APPOINTMENT (OUTPATIENT)
Dept: OBSTETRICS AND GYNECOLOGY | Facility: CLINIC | Age: 40
End: 2025-08-22
Payer: COMMERCIAL

## 2025-08-22 VITALS
BODY MASS INDEX: 19.25 KG/M2 | SYSTOLIC BLOOD PRESSURE: 110 MMHG | DIASTOLIC BLOOD PRESSURE: 72 MMHG | HEIGHT: 68 IN | WEIGHT: 127 LBS

## 2025-08-22 DIAGNOSIS — Z12.39 ENCOUNTER FOR BREAST CANCER SCREENING USING NON-MAMMOGRAM MODALITY: ICD-10-CM

## 2025-08-22 DIAGNOSIS — Z12.4 CERVICAL CANCER SCREENING: ICD-10-CM

## 2025-08-22 DIAGNOSIS — Z11.3 SCREENING EXAMINATION FOR STD (SEXUALLY TRANSMITTED DISEASE): ICD-10-CM

## 2025-08-22 DIAGNOSIS — Z01.419 VISIT FOR PELVIC EXAM: Primary | ICD-10-CM

## 2025-08-22 PROCEDURE — 99395 PREV VISIT EST AGE 18-39: CPT | Performed by: OBSTETRICS & GYNECOLOGY

## 2025-08-22 PROCEDURE — 1036F TOBACCO NON-USER: CPT | Performed by: OBSTETRICS & GYNECOLOGY

## 2025-08-22 PROCEDURE — 3008F BODY MASS INDEX DOCD: CPT | Performed by: OBSTETRICS & GYNECOLOGY

## 2025-08-22 ASSESSMENT — ENCOUNTER SYMPTOMS
CARDIOVASCULAR NEGATIVE: 1
RESPIRATORY NEGATIVE: 1
PSYCHIATRIC NEGATIVE: 1
NEUROLOGICAL NEGATIVE: 1
CONSTITUTIONAL NEGATIVE: 1
GASTROINTESTINAL NEGATIVE: 1
MUSCULOSKELETAL NEGATIVE: 1

## 2025-08-23 LAB
C TRACH RRNA SPEC QL NAA+PROBE: NOT DETECTED
N GONORRHOEA RRNA SPEC QL NAA+PROBE: NOT DETECTED
QUEST GC CT AMPLIFIED (ALWAYS MESSAGE): NORMAL

## 2025-08-29 ENCOUNTER — APPOINTMENT (OUTPATIENT)
Dept: ORTHOPEDIC SURGERY | Facility: HOSPITAL | Age: 40
End: 2025-08-29
Payer: COMMERCIAL

## 2025-08-29 ASSESSMENT — PAIN SCALES - GENERAL: PAINLEVEL_OUTOF10: 0 - NO PAIN

## 2025-08-29 ASSESSMENT — PAIN - FUNCTIONAL ASSESSMENT: PAIN_FUNCTIONAL_ASSESSMENT: 0-10

## (undated) DEVICE — BUR, SPHERICAL, 5.5MM, PREBENT

## (undated) DEVICE — Device

## (undated) DEVICE — SYRINGE, 10 CC, LUER LOCK

## (undated) DEVICE — BLADE, GREAT WHITE CONCAVE, 4.2MM, PREBENT

## (undated) DEVICE — DRILL, ICONIX, 1.4MM, DISPOSABLE

## (undated) DEVICE — SUTURE TAPE, XBRAID, 1.4MM BLACK/WHITE

## (undated) DEVICE — SUTURE MANAGER, NANOPASSER, CRESCENT

## (undated) DEVICE — ADAPTER, Y TUBING STERILE

## (undated) DEVICE — TUBING, SUCTION, 9 FT, STERILE, CLEAR

## (undated) DEVICE — CATHETER TRAY, FOLEY, 18FR, 10ML, W/ DRAINBAG

## (undated) DEVICE — DRAPE, INSTRUMENT, W/POUCH, STERI DRAPE, 9 5/8 X 18 LONG

## (undated) DEVICE — SUTURE, ETHILON, 3-0, 18 IN, PS1, BLACK

## (undated) DEVICE — CONTAINER, SPECIMEN, 4 OZ, OR PEEL PACK, STERILE

## (undated) DEVICE — BUR, SPHERICAL, 4.5MM, PREBENT

## (undated) DEVICE — CANNULA, 7 X 110

## (undated) DEVICE — ARTHROWAND, MULTIVAC 50XL, ICW

## (undated) DEVICE — SLINGSHOT, 45 DEG UP

## (undated) DEVICE — CANNULA, FLOWPORT II, WITH OBTURATOR

## (undated) DEVICE — TUBING, NFLOW, FMS VUE, SNGL USE, DISP, LF

## (undated) DEVICE — GLOVE, SURGICAL, PROTEXIS PI MICRO, 6.5, PF, LF

## (undated) DEVICE — GLOVE, SURGICAL, PROTEXIS PI BLUE W/NEUTHERA, 7.0, PF, LF

## (undated) DEVICE — GLOVE, SURGICAL, PROTEXIS PI MICRO, 8.0, PF, LF

## (undated) DEVICE — GOWN, SURGICAL, SMARTGOWN, XLARGE, STERILE

## (undated) DEVICE — ENTRY KIT, PORTAL

## (undated) DEVICE — TUBING, OUTFLOW, DRAIN PIPE, W/ONE WAY VALVE (FMS VUE)

## (undated) DEVICE — GLOVE, SURGICAL, PROTEXIS PI W/NEU-THERA, 8.5, PF, LF

## (undated) DEVICE — SYRINGE, 60 CC, IRRIGATION, BULB, CONTRO-BULB, PAPER POUCH